# Patient Record
Sex: FEMALE | Race: WHITE | NOT HISPANIC OR LATINO | Employment: UNEMPLOYED | URBAN - METROPOLITAN AREA
[De-identification: names, ages, dates, MRNs, and addresses within clinical notes are randomized per-mention and may not be internally consistent; named-entity substitution may affect disease eponyms.]

---

## 2017-01-03 ENCOUNTER — APPOINTMENT (OUTPATIENT)
Dept: GENERAL RADIOLOGY | Facility: HOSPITAL | Age: 57
End: 2017-01-03

## 2017-01-03 ENCOUNTER — APPOINTMENT (OUTPATIENT)
Dept: CT IMAGING | Facility: HOSPITAL | Age: 57
End: 2017-01-03

## 2017-01-03 ENCOUNTER — PREP FOR SURGERY (OUTPATIENT)
Dept: CARDIOLOGY | Facility: CLINIC | Age: 57
End: 2017-01-03

## 2017-01-03 ENCOUNTER — HOSPITAL ENCOUNTER (EMERGENCY)
Facility: HOSPITAL | Age: 57
Discharge: HOME | End: 2017-01-03
Attending: EMERGENCY MEDICINE | Admitting: EMERGENCY MEDICINE

## 2017-01-03 VITALS
WEIGHT: 130 LBS | HEIGHT: 66 IN | DIASTOLIC BLOOD PRESSURE: 70 MMHG | HEART RATE: 70 BPM | TEMPERATURE: 97.9 F | RESPIRATION RATE: 18 BRPM | OXYGEN SATURATION: 92 % | SYSTOLIC BLOOD PRESSURE: 99 MMHG | BODY MASS INDEX: 20.89 KG/M2

## 2017-01-03 DIAGNOSIS — F19.10 DRUG ABUSE (HCC): ICD-10-CM

## 2017-01-03 DIAGNOSIS — I42.9 CARDIOMYOPATHY (HCC): Primary | ICD-10-CM

## 2017-01-03 DIAGNOSIS — R40.0 SOMNOLENCE: Primary | ICD-10-CM

## 2017-01-03 LAB
ALBUMIN SERPL-MCNC: 4.2 G/DL (ref 3.2–4.8)
ALBUMIN/GLOB SERPL: 1.7 G/DL (ref 1.5–2.5)
ALP SERPL-CCNC: 78 U/L (ref 25–100)
ALT SERPL W P-5'-P-CCNC: 6 U/L (ref 7–40)
AMPHET+METHAMPHET UR QL: NEGATIVE
AMPHETAMINES UR QL: NEGATIVE
ANION GAP SERPL CALCULATED.3IONS-SCNC: 3 MMOL/L (ref 3–11)
APAP SERPL-MCNC: <10 MCG/ML (ref 10–30)
AST SERPL-CCNC: 17 U/L (ref 0–33)
BACTERIA UR QL AUTO: ABNORMAL /HPF
BARBITURATES UR QL SCN: NEGATIVE
BASOPHILS # BLD AUTO: 0.05 10*3/MM3 (ref 0–0.2)
BASOPHILS NFR BLD AUTO: 0.6 % (ref 0–1)
BENZODIAZ UR QL SCN: POSITIVE
BILIRUB SERPL-MCNC: 0.2 MG/DL (ref 0.3–1.2)
BILIRUB UR QL STRIP: ABNORMAL
BUN BLD-MCNC: 14 MG/DL (ref 9–23)
BUN/CREAT SERPL: 20 (ref 7–25)
BUPRENORPHINE SERPL-MCNC: NEGATIVE NG/ML
CALCIUM SPEC-SCNC: 9.5 MG/DL (ref 8.7–10.4)
CANNABINOIDS SERPL QL: NEGATIVE
CHLORIDE SERPL-SCNC: 110 MMOL/L (ref 99–109)
CLARITY UR: CLEAR
CO2 SERPL-SCNC: 28 MMOL/L (ref 20–31)
COCAINE UR QL: NEGATIVE
COLOR UR: YELLOW
CREAT BLD-MCNC: 0.7 MG/DL (ref 0.6–1.3)
DEPRECATED RDW RBC AUTO: 47 FL (ref 37–54)
EOSINOPHIL # BLD AUTO: 0.17 10*3/MM3 (ref 0.1–0.3)
EOSINOPHIL NFR BLD AUTO: 2.1 % (ref 0–3)
ERYTHROCYTE [DISTWIDTH] IN BLOOD BY AUTOMATED COUNT: 12.5 % (ref 11.3–14.5)
ETHANOL BLD-MCNC: <10 MG/DL (ref 0–10)
GFR SERPL CREATININE-BSD FRML MDRD: 87 ML/MIN/1.73
GLOBULIN UR ELPH-MCNC: 2.5 GM/DL
GLUCOSE BLD-MCNC: 90 MG/DL (ref 70–100)
GLUCOSE BLDC GLUCOMTR-MCNC: 107 MG/DL (ref 70–130)
GLUCOSE UR STRIP-MCNC: NEGATIVE MG/DL
HCT VFR BLD AUTO: 38.8 % (ref 34.5–44)
HGB BLD-MCNC: 13.1 G/DL (ref 11.5–15.5)
HGB UR QL STRIP.AUTO: ABNORMAL
HOLD SPECIMEN: NORMAL
HYALINE CASTS UR QL AUTO: ABNORMAL /LPF
IMM GRANULOCYTES # BLD: 0.01 10*3/MM3 (ref 0–0.03)
IMM GRANULOCYTES NFR BLD: 0.1 % (ref 0–0.6)
KETONES UR QL STRIP: NEGATIVE
LEUKOCYTE ESTERASE UR QL STRIP.AUTO: NEGATIVE
LYMPHOCYTES # BLD AUTO: 2.91 10*3/MM3 (ref 0.6–4.8)
LYMPHOCYTES NFR BLD AUTO: 35.6 % (ref 24–44)
MAGNESIUM SERPL-MCNC: 2.1 MG/DL (ref 1.3–2.7)
MCH RBC QN AUTO: 34.6 PG (ref 27–31)
MCHC RBC AUTO-ENTMCNC: 33.8 G/DL (ref 32–36)
MCV RBC AUTO: 102.4 FL (ref 80–99)
METHADONE UR QL SCN: NEGATIVE
MONOCYTES # BLD AUTO: 0.31 10*3/MM3 (ref 0–1)
MONOCYTES NFR BLD AUTO: 3.8 % (ref 0–12)
MUCOUS THREADS URNS QL MICRO: ABNORMAL /HPF
NEUTROPHILS # BLD AUTO: 4.72 10*3/MM3 (ref 1.5–8.3)
NEUTROPHILS NFR BLD AUTO: 57.8 % (ref 41–71)
NITRITE UR QL STRIP: NEGATIVE
OPIATES UR QL: POSITIVE
OXYCODONE UR QL SCN: NEGATIVE
PCP UR QL SCN: NEGATIVE
PH UR STRIP.AUTO: 5.5 [PH] (ref 5–8)
PLATELET # BLD AUTO: 214 10*3/MM3 (ref 150–450)
PMV BLD AUTO: 10 FL (ref 6–12)
POTASSIUM BLD-SCNC: 4.1 MMOL/L (ref 3.5–5.5)
PROPOXYPH UR QL: NEGATIVE
PROT SERPL-MCNC: 6.7 G/DL (ref 5.7–8.2)
PROT UR QL STRIP: NEGATIVE
RBC # BLD AUTO: 3.79 10*6/MM3 (ref 3.89–5.14)
RBC # UR: ABNORMAL /HPF
REF LAB TEST METHOD: ABNORMAL
SALICYLATES SERPL-MCNC: <1 MG/DL (ref 0–29)
SODIUM BLD-SCNC: 141 MMOL/L (ref 132–146)
SP GR UR STRIP: 1.02 (ref 1–1.03)
SQUAMOUS #/AREA URNS HPF: ABNORMAL /HPF
TRICYCLICS UR QL SCN: NEGATIVE
TROPONIN I SERPL-MCNC: 0 NG/ML (ref 0–0.07)
TSH SERPL DL<=0.05 MIU/L-ACNC: 1.21 MIU/ML (ref 0.35–5.35)
UROBILINOGEN UR QL STRIP: ABNORMAL
WBC NRBC COR # BLD: 8.17 10*3/MM3 (ref 3.5–10.8)
WBC UR QL AUTO: ABNORMAL /HPF
WHOLE BLOOD HOLD SPECIMEN: NORMAL
WHOLE BLOOD HOLD SPECIMEN: NORMAL

## 2017-01-03 PROCEDURE — 80306 DRUG TEST PRSMV INSTRMNT: CPT | Performed by: EMERGENCY MEDICINE

## 2017-01-03 PROCEDURE — 96374 THER/PROPH/DIAG INJ IV PUSH: CPT

## 2017-01-03 PROCEDURE — 84484 ASSAY OF TROPONIN QUANT: CPT

## 2017-01-03 PROCEDURE — 80307 DRUG TEST PRSMV CHEM ANLYZR: CPT | Performed by: EMERGENCY MEDICINE

## 2017-01-03 PROCEDURE — 71010 HC CHEST PA OR AP: CPT

## 2017-01-03 PROCEDURE — 85025 COMPLETE CBC W/AUTO DIFF WBC: CPT | Performed by: EMERGENCY MEDICINE

## 2017-01-03 PROCEDURE — 96361 HYDRATE IV INFUSION ADD-ON: CPT

## 2017-01-03 PROCEDURE — 99284 EMERGENCY DEPT VISIT MOD MDM: CPT

## 2017-01-03 PROCEDURE — 81001 URINALYSIS AUTO W/SCOPE: CPT | Performed by: EMERGENCY MEDICINE

## 2017-01-03 PROCEDURE — 25010000002 NALOXONE PER 1 MG

## 2017-01-03 PROCEDURE — 70450 CT HEAD/BRAIN W/O DYE: CPT

## 2017-01-03 PROCEDURE — 83735 ASSAY OF MAGNESIUM: CPT | Performed by: EMERGENCY MEDICINE

## 2017-01-03 PROCEDURE — 82962 GLUCOSE BLOOD TEST: CPT

## 2017-01-03 PROCEDURE — 93005 ELECTROCARDIOGRAM TRACING: CPT

## 2017-01-03 PROCEDURE — 80053 COMPREHEN METABOLIC PANEL: CPT | Performed by: EMERGENCY MEDICINE

## 2017-01-03 PROCEDURE — 84443 ASSAY THYROID STIM HORMONE: CPT | Performed by: EMERGENCY MEDICINE

## 2017-01-03 RX ORDER — NALOXONE HCL 0.4 MG/ML
0.4 VIAL (ML) INJECTION ONCE
Status: COMPLETED | OUTPATIENT
Start: 2017-01-03 | End: 2017-01-03

## 2017-01-03 RX ORDER — NALOXONE HYDROCHLORIDE 0.4 MG/ML
INJECTION, SOLUTION INTRAMUSCULAR; INTRAVENOUS; SUBCUTANEOUS
Status: COMPLETED
Start: 2017-01-03 | End: 2017-01-03

## 2017-01-03 RX ORDER — SODIUM CHLORIDE 0.9 % (FLUSH) 0.9 %
10 SYRINGE (ML) INJECTION AS NEEDED
Status: DISCONTINUED | OUTPATIENT
Start: 2017-01-03 | End: 2017-01-03 | Stop reason: HOSPADM

## 2017-01-03 RX ORDER — SODIUM CHLORIDE 0.9 % (FLUSH) 0.9 %
1-10 SYRINGE (ML) INJECTION AS NEEDED
Status: CANCELLED | OUTPATIENT
Start: 2017-01-03

## 2017-01-03 RX ADMIN — NALOXONE HYDROCHLORIDE 0.4 MG: 0.4 INJECTION, SOLUTION INTRAMUSCULAR; INTRAVENOUS; SUBCUTANEOUS at 17:16

## 2017-01-03 RX ADMIN — Medication 0.4 MG: at 17:16

## 2017-01-03 RX ADMIN — Medication 0.4 MG: at 17:30

## 2017-01-03 RX ADMIN — NALOXONE HYDROCHLORIDE 0.4 MG: 0.4 INJECTION, SOLUTION INTRAMUSCULAR; INTRAVENOUS; SUBCUTANEOUS at 17:30

## 2017-01-03 RX ADMIN — SODIUM CHLORIDE 1000 ML: 9 INJECTION, SOLUTION INTRAVENOUS at 17:42

## 2017-01-03 NOTE — ED PROVIDER NOTES
"Subjective   HPI Comments: 56 yr old female presents to the ED with AMS. She reports that she took 2 10 mg Xanax po this morning due to, what she verbalized to the nurse, has been a significant amount of ongoing stress. She also notes that she took some morphine (\"3 pills\") yesterday. When a boyfriend dropped her off at a store, she staggered back out shortly thereafter. She was also dizzy/lightheaded and non-communicative.  notes that the pt seemed at baseline before entering the store. Pt denies SI. Pt c/o chronic back pain and a cough for a couple of days. She denies CP, SOA, or fever/chills.     When EMS arrived on the scene, they noted that the pt's pupils were initially pinpoint. She had mild improvement of sx with some Narcan in the ED.            Patient is a 56 y.o. female presenting with altered mental status and drug/alcohol assessment.   History provided by:  Patient and friend  Altered Mental Status   Presenting symptoms: confusion and partial responsiveness (difficulty with speech)    Severity:  Moderate  Most recent episode:  Today  Progression:  Partially resolved  Chronicity:  New  Context: not taking medications as prescribed    Associated symptoms: light-headedness    Associated symptoms: no fever    Drug / Alcohol Assessment   Primary symptoms include confusion, somnolence. This is a new problem. The current episode started 3 to 5 hours ago. The problem has been gradually improving. Suspected agents: Xanax and morpine. Pertinent negatives include no fever.       Review of Systems   Constitutional: Negative for chills and fever.   Respiratory: Positive for cough. Negative for shortness of breath.    Cardiovascular: Negative for chest pain.   Musculoskeletal: Positive for back pain (chronic) and gait problem.   Neurological: Positive for dizziness, speech difficulty and light-headedness.   Psychiatric/Behavioral: Positive for confusion. Negative for suicidal ideas. The patient is " "nervous/anxious (significant amount of stress prior to taking the Xanax today).    All other systems reviewed and are negative.      Past Medical History   Diagnosis Date   • Arthritis    • Back pain    • COPD (chronic obstructive pulmonary disease)    • Depression    • Diverticulitis    • Dizziness    • Endometriosis    • Headache    • Hypertension    • IBS (irritable bowel syndrome)    • ICD (implantable cardioverter-defibrillator) in place    • Insomnia    • Long Q-T syndrome    • Myocardial infarction 2006   • Restless leg    • Seizure      2006- with med reaction to phenergan- per pt   • Wears dentures    • Wears glasses      reading       Allergies   Allergen Reactions   • Levaquin [Levofloxacin] Hives   • Morphine And Related Nausea And Vomiting   • Phenergan [Promethazine Hcl] Other (See Comments)     seizure   • Vancomycin Other (See Comments)     \"ed\" syndrome   • Sulfa Antibiotics Rash       Past Surgical History   Procedure Laterality Date   • Hysterectomy  1989     Via exploratory laparotomy (transverse incision) along with incidental appendectomy.  Done for endometriosis   • Lumbar fusion  2015   • Oophorectomy Right 1986     Done via vertical midline incision   • Bladder suspension  2000 [place] - done via laparotomy   • Colonoscopy       2010   • Cardiac electrophysiology procedure N/A 8/4/2016     Procedure: replace old icd, likely extract both old leads with OR back-up;  Surgeon: Robert Queen MD;  Location: [facility] INVASIVE LOC[facility];  Service:    • Meniscectomy Right 1993   • Tonsillectomy and adenoidectomy  1973       Family History   Problem Relation Age of Onset   • Arthritis Mother    • Hypertension Mother    • Thyroid disease Mother    • Hypertension Father    • Colon cancer Sister 50   • Hypertension Brother        Social History     Social History   • Marital status:      Spouse name: N/A   • Number of children: N/A   • Years of education: N/A     Social History Main " Topics   • Smoking status: Light Tobacco Smoker     Packs/day: 0.50     Types: Cigarettes, Electronic Cigarette     Start date: 1975   • Smokeless tobacco: Never Used      Comment: down to 5 cigarettes daily---ecig 1 time per hour- per pt   • Alcohol use No   • Drug use: No   • Sexual activity: Defer     Other Topics Concern   • None     Social History Narrative         Objective   Physical Exam   Constitutional: She is oriented to person, place, and time. She appears well-developed and well-nourished. She appears lethargic.  Non-toxic appearance. No distress.   Somnolent, but awoke and answered questions appropriately to voice   HENT:   Head: Normocephalic and atraumatic.   Right Ear: External ear normal.   Left Ear: External ear normal.   Nose: Nose normal.   Eyes: EOM and lids are normal. Pupils are equal, round, and reactive to light.   Neck: Normal range of motion. Neck supple. No tracheal deviation present.   Cardiovascular: Normal rate, regular rhythm and normal heart sounds.  Exam reveals no gallop, no friction rub and no decreased pulses.    No murmur heard.  Pulmonary/Chest: Effort normal and breath sounds normal. No respiratory distress. She has no decreased breath sounds. She has no wheezes. She has no rhonchi. She has no rales.   Abdominal: Soft. Normal appearance and bowel sounds are normal. There is no tenderness. There is no rebound and no guarding.   Musculoskeletal: Normal range of motion. She exhibits no deformity.   Lower extremity exam is unremarkable   Lymphadenopathy:     She has no cervical adenopathy.   Neurological: She is oriented to person, place, and time. She has normal strength. She appears lethargic. No cranial nerve deficit or sensory deficit.   GCS of 14    No neurological deficits are appreciated   Skin: Skin is warm and dry. No rash noted. She is not diaphoretic.   Psychiatric: She has a normal mood and affect. Her speech is normal and behavior is normal. Judgment and thought  content normal. Cognition and memory are normal.   Nursing note and vitals reviewed.      Procedures         ED Course  ED Course                     MDM  Number of Diagnoses or Management Options  Drug abuse: new and requires workup  Somnolence: new and requires workup  Diagnosis management comments: Patient appears better.     Ilicit drug use felt to be cause for altered mentation.     No significant lab or imaging abnormalities.        Amount and/or Complexity of Data Reviewed  Clinical lab tests: ordered and reviewed  Tests in the radiology section of CPT®: ordered and reviewed  Decide to obtain previous medical records or to obtain history from someone other than the patient: yes  Obtain history from someone other than the patient: yes  Review and summarize past medical records: yes  Independent visualization of images, tracings, or specimens: yes    Patient Progress  Patient progress: stable      Final diagnoses:   Somnolence   Drug abuse       Documentation assistance provided by laverne Cobian.  Information recorded by the scribe was done at my direction and has been verified and validated by me.     Mira Cobian  01/03/17 1754       Mira Cobian  01/03/17 1934       Manda Dominique MD  01/03/17 1943

## 2017-01-04 ENCOUNTER — APPOINTMENT (OUTPATIENT)
Dept: PREADMISSION TESTING | Facility: HOSPITAL | Age: 57
End: 2017-01-04

## 2017-01-05 ENCOUNTER — TELEPHONE (OUTPATIENT)
Dept: INTERNAL MEDICINE | Facility: CLINIC | Age: 57
End: 2017-01-05

## 2017-01-05 NOTE — TELEPHONE ENCOUNTER
She is not able to see pain management until April 6th. She stated that is the first available appt. She says she is in so much pain she wants to die. Has taken IBU, tylenol, and aleve.  Note on 01/03 stating she was in ER with what they felt was illicit drug use.   She would like for you to give her something for pain.  Please advise.

## 2017-01-06 RX ORDER — DESVENLAFAXINE 50 MG/1
50 TABLET, EXTENDED RELEASE ORAL DAILY
Qty: 30 TABLET | Refills: 2 | Status: SHIPPED | OUTPATIENT
Start: 2017-01-06 | End: 2017-04-16 | Stop reason: SDUPTHER

## 2017-01-06 NOTE — TELEPHONE ENCOUNTER
Spoke with pt and she declines to take the tramadol rx, states its like candied ibuprofen and does not work for her, and says that she has also tried to get  to change her depression medication and it hasnt been done,  said we could try wellbutrin xl 150, pt said last time she took that she went off but she has never tried it along with celexa,  said if it affected her in that way she does not want her to take it, told her we would change it to Pristiq and had samples for her to try that would last 21 days in case the rx needed a pa

## 2017-01-06 NOTE — TELEPHONE ENCOUNTER
She was suppose to have her ICD relocated but refused to have it done because she was requesting oxycodone and did not get it. Can dc her nsaids and tylenol. Can have tramdol 50 ng poqd x 3 days only. 3. Must be seen by pain management

## 2017-01-11 ENCOUNTER — TELEPHONE (OUTPATIENT)
Dept: INTERNAL MEDICINE | Facility: CLINIC | Age: 57
End: 2017-01-11

## 2017-01-11 NOTE — TELEPHONE ENCOUNTER
Received fax from [facility] pharmacy requesting a PA for Pristiq 50 mg tab, submitted PA via phone, PA was approved for 1 year, reference # [id number], pharmacy notified.

## 2017-01-22 DIAGNOSIS — J43.9 PULMONARY EMPHYSEMA, UNSPECIFIED EMPHYSEMA TYPE (HCC): ICD-10-CM

## 2017-02-21 DIAGNOSIS — R11.15 INTRACTABLE CYCLICAL VOMITING WITH NAUSEA: ICD-10-CM

## 2017-02-21 RX ORDER — ONDANSETRON 4 MG/1
TABLET, ORALLY DISINTEGRATING ORAL
Qty: 30 TABLET | Refills: 0 | Status: SHIPPED | OUTPATIENT
Start: 2017-02-21 | End: 2017-03-21 | Stop reason: SDUPTHER

## 2017-03-21 ENCOUNTER — OFFICE VISIT (OUTPATIENT)
Dept: INTERNAL MEDICINE | Facility: CLINIC | Age: 57
End: 2017-03-21

## 2017-03-21 VITALS
OXYGEN SATURATION: 97 % | BODY MASS INDEX: 23.24 KG/M2 | SYSTOLIC BLOOD PRESSURE: 120 MMHG | WEIGHT: 144 LBS | DIASTOLIC BLOOD PRESSURE: 90 MMHG | HEART RATE: 107 BPM

## 2017-03-21 DIAGNOSIS — J44.9 COPD, MODERATE (HCC): ICD-10-CM

## 2017-03-21 DIAGNOSIS — F32.A DEPRESSION, UNSPECIFIED DEPRESSION TYPE: ICD-10-CM

## 2017-03-21 DIAGNOSIS — F17.200 TOBACCO DEPENDENCE: ICD-10-CM

## 2017-03-21 DIAGNOSIS — J43.9 PULMONARY EMPHYSEMA, UNSPECIFIED EMPHYSEMA TYPE (HCC): ICD-10-CM

## 2017-03-21 DIAGNOSIS — I10 ESSENTIAL HYPERTENSION: Primary | ICD-10-CM

## 2017-03-21 DIAGNOSIS — R11.15 INTRACTABLE CYCLICAL VOMITING WITH NAUSEA: ICD-10-CM

## 2017-03-21 DIAGNOSIS — D12.6 TUBULAR ADENOMA OF COLON: ICD-10-CM

## 2017-03-21 PROCEDURE — 99213 OFFICE O/P EST LOW 20 MIN: CPT | Performed by: INTERNAL MEDICINE

## 2017-03-21 PROCEDURE — 99406 BEHAV CHNG SMOKING 3-10 MIN: CPT | Performed by: INTERNAL MEDICINE

## 2017-03-21 RX ORDER — SPIRONOLACTONE 25 MG/1
25 TABLET ORAL DAILY
Qty: 30 TABLET | Refills: 3 | Status: SHIPPED | OUTPATIENT
Start: 2017-03-21 | End: 2017-07-17 | Stop reason: SDUPTHER

## 2017-03-21 RX ORDER — ONDANSETRON 4 MG/1
TABLET, ORALLY DISINTEGRATING ORAL
Qty: 30 TABLET | Refills: 0 | Status: SHIPPED | OUTPATIENT
Start: 2017-03-21 | End: 2017-04-21 | Stop reason: SDUPTHER

## 2017-03-21 NOTE — PROGRESS NOTES
Subjective   Melita Syed is a 56 y.o. female.   Chief Complaint   Patient presents with   • COPD   • Hypertension   • Depression       Hypertension   This is a chronic problem. The current episode started more than 1 year ago. Pertinent negatives include no chest pain, headaches, neck pain or shortness of breath. The current treatment provides mild improvement. Compliance problems include diet and exercise.    COPD   This is a chronic problem. The current episode started more than 1 year ago. Associated symptoms include coughing. Pertinent negatives include no abdominal pain, arthralgias, chest pain, chills, congestion, diaphoresis, fatigue, fever, headaches, myalgias, nausea, neck pain, numbness, rash, sore throat or vomiting.   Depression   Visit Type: follow-up  Patient is not experiencing: panic, psychomotor agitation, psychomotor retardation, restlessness, shortness of breath, suicidal ideas and suicidal planning.       Cough x months. CXR showed no acute findings.  The following portions of the patient's history were reviewed and updated as appropriate: allergies, current medications, past family history, past medical history, past social history, past surgical history and problem list.    Review of Systems   Constitutional: Negative for activity change, appetite change, chills, diaphoresis, fatigue, fever and unexpected weight change.   HENT: Negative for congestion, ear discharge, ear pain, mouth sores, nosebleeds, sinus pressure, sneezing and sore throat.    Eyes: Negative for pain, discharge and itching.   Respiratory: Positive for cough. Negative for chest tightness, shortness of breath and wheezing.    Cardiovascular: Negative for chest pain and leg swelling.   Gastrointestinal: Negative for abdominal pain, constipation, diarrhea, nausea and vomiting.   Endocrine: Negative for cold intolerance, heat intolerance, polydipsia and polyphagia.   Genitourinary: Negative for dysuria, flank pain, frequency,  hematuria and urgency.   Musculoskeletal: Negative for arthralgias, back pain, gait problem, myalgias, neck pain and neck stiffness.   Skin: Negative for color change, pallor and rash.   Neurological: Negative for seizures, speech difficulty, numbness and headaches.   Psychiatric/Behavioral: Negative for agitation, sleep disturbance and suicidal ideas.     Visit Vitals   • /90   • Pulse 107   • Wt 144 lb (65.3 kg)   • LMP 09/12/1989 (Approximate)   • SpO2 97%   • BMI 23.24 kg/m2       Objective   Physical Exam   Constitutional: She appears well-developed.   HENT:   Head: Normocephalic.   Right Ear: External ear normal.   Left Ear: External ear normal.   Nose: Nose normal.   Mouth/Throat: Oropharynx is clear and moist.   Eyes: Conjunctivae are normal. Pupils are equal, round, and reactive to light.   Neck: No JVD present. No thyromegaly present.   Cardiovascular: Normal rate, regular rhythm and normal heart sounds.  Exam reveals no friction rub.    No murmur heard.  Pulmonary/Chest: Effort normal and breath sounds normal. No respiratory distress. She has no wheezes. She has no rales.   Abdominal: Soft. Bowel sounds are normal. She exhibits no distension. There is no tenderness. There is no guarding.   Musculoskeletal: She exhibits no edema or tenderness.   Lymphadenopathy:     She has no cervical adenopathy.   Neurological: She displays normal reflexes. No cranial nerve deficit.   Skin: No rash noted.   Psychiatric: Her behavior is normal.   Nursing note and vitals reviewed.      Assessment/Plan   Melita was seen today for copd, hypertension and depression.    Diagnoses and all orders for this visit:    Essential hypertension  DBP is up but not taking her meds today. She will take her metoprolol when she gets home Pulse is up today but has not had her b blocker  Tubular adenoma of colon  Will need repeat colonoscopy in 2019/Sept  Tobacco dependence  3 minutes counseling but not ready to quit  COPD,  moderate  stable  Depression, unspecified depression type  Stable  50% of visit spent counseling.  Blood work next visit

## 2017-03-21 NOTE — PATIENT INSTRUCTIONS
IF YOU SMOKE OR USE TOBACCO PLEASE READ THE FOLLOWING:    Why is smoking bad for me?  Smoking increases the risk of heart disease, lung disease, vascular disease, stroke, and cancer.     If you smoke, STOP!    If you would like more information on quitting smoking, please visit the [employer] website: [email or web address]   This link will provide additional resources including the QUIT line and the Beat the Pack support groups.     For more information:    American Cancer Society  [phone]    American Heart Association  [phone]      Steps to Quit Smoking   Smoking tobacco can be harmful to your health and can affect almost every organ in your body. Smoking puts you, and those around you, at risk for developing many serious chronic diseases. Quitting smoking is difficult, but it is one of the best things that you can do for your health. It is never too late to quit.  WHAT ARE THE BENEFITS OF QUITTING SMOKING?  When you quit smoking, you lower your risk of developing serious diseases and conditions, such as:  · Lung cancer or lung disease, such as COPD.  · Heart disease.  · Stroke.  · Heart attack.  · Infertility.  · Osteoporosis and bone fractures.  Additionally, symptoms such as coughing, wheezing, and shortness of breath may get better when you quit. You may also find that you get sick less often because your body is stronger at fighting off colds and infections. If you are pregnant, quitting smoking can help to reduce your chances of having a baby of low birth weight.  HOW DO I GET READY TO QUIT?  When you decide to quit smoking, create a plan to make sure that you are successful. Before you quit:  · Pick a date to quit. Set a date within the next two weeks to give you time to prepare.  · Write down the reasons why you are quitting. Keep this list in places where you will see it often, such as on your bathroom mirror or in your car or  "wallet.  · Identify the people, places, things, and activities that make you want to smoke (triggers) and avoid them. Make sure to take these actions:    Throw away all cigarettes at home, at work, and in your car.    Throw away smoking accessories, such as ashtrays and lighters.    Clean your car and make sure to empty the ashtray.    Clean your home, including curtains and carpets.  · Tell your family, friends, and coworkers that you are quitting. Support from your loved ones can make quitting easier.  · Talk with your health care provider about your options for quitting smoking.  · Find out what treatment options are covered by your health insurance.  WHAT STRATEGIES CAN I USE TO QUIT SMOKING?   Talk with your healthcare provider about different strategies to quit smoking. Some strategies include:  · Quitting smoking altogether instead of gradually lessening how much you smoke over a period of time. Research shows that quitting \"cold turkey\" is more successful than gradually quitting.  · Attending in-person counseling to help you build problem-solving skills. You are more likely to have success in quitting if you attend several counseling sessions. Even short sessions of 10 minutes can be effective.  · Finding resources and support systems that can help you to quit smoking and remain smoke-free after you quit. These resources are most helpful when you use them often. They can include:    Online chats with a counselor.    Telephone quitlines.    Printed self-help materials.    Support groups or group counseling.    Text messaging programs.    Mobile phone applications.  · Taking medicines to help you quit smoking. (If you are pregnant or breastfeeding, talk with your health care provider first.) Some medicines contain nicotine and some do not. Both types of medicines help with cravings, but the medicines that include nicotine help to relieve withdrawal symptoms. Your health care provider may recommend:    Nicotine " patches, gum, or lozenges.    Nicotine inhalers or sprays.    Non-nicotine medicine that is taken by mouth.  Talk with your health care provider about combining strategies, such as taking medicines while you are also receiving in-person counseling. Using these two strategies together makes you more likely to succeed in quitting than if you used either strategy on its own.  If you are pregnant or breastfeeding, talk with your health care provider about finding counseling or other support strategies to quit smoking. Do not take medicine to help you quit smoking unless told to do so by your health care provider.  WHAT THINGS CAN I DO TO MAKE IT EASIER TO QUIT?  Quitting smoking might feel overwhelming at first, but there is a lot that you can do to make it easier. Take these important actions:  · Reach out to your family and friends and ask that they support and encourage you during this time. Call telephone quitlines, reach out to support groups, or work with a counselor for support.  · Ask people who smoke to avoid smoking around you.  · Avoid places that trigger you to smoke, such as bars, parties, or smoke-break areas at work.  · Spend time around people who do not smoke.  · Lessen stress in your life, because stress can be a smoking trigger for some people. To lessen stress, try:    Exercising regularly.    Deep-breathing exercises.    Yoga.    Meditating.    Performing a body scan. This involves closing your eyes, scanning your body from head to toe, and noticing which parts of your body are particularly tense. Purposefully relax the muscles in those areas.  · Download or purchase mobile phone or tablet apps (applications) that can help you stick to your quit plan by providing reminders, tips, and encouragement. There are many free apps, such as QuitGuide from the CDC (Centers for Disease Control and Prevention). You can find other support for quitting smoking (smoking cessation) through smokefree.gov and other  websites.  HOW WILL I FEEL WHEN I QUIT SMOKING?  Within the first 24 hours of quitting smoking, you may start to feel some withdrawal symptoms. These symptoms are usually most noticeable 2-3 days after quitting, but they usually do not last beyond 2-3 weeks. Changes or symptoms that you might experience include:  · Mood swings.  · Restlessness, anxiety, or irritation.  · Difficulty concentrating.  · Dizziness.  · Strong cravings for sugary foods in addition to nicotine.  · Mild weight gain.  · Constipation.  · Nausea.  · Coughing or a sore throat.  · Changes in how your medicines work in your body.  · A depressed mood.  · Difficulty sleeping (insomnia).  After the first 2-3 weeks of quitting, you may start to notice more positive results, such as:  · Improved sense of smell and taste.  · Decreased coughing and sore throat.  · Slower heart rate.  · Lower blood pressure.  · Clearer skin.  · The ability to breathe more easily.  · Fewer sick days.  Quitting smoking is very challenging for most people. Do not get discouraged if you are not successful the first time. Some people need to make many attempts to quit before they achieve long-term success. Do your best to stick to your quit plan, and talk with your health care provider if you have any questions or concerns.     This information is not intended to replace advice given to you by your health care provider. Make sure you discuss any questions you have with your health care provider.     Document Released: 12/12/2002 Document Revised: 05/03/2016 Document Reviewed: 05/03/2016  [facility] Interactive Patient Education ©2016 [employer]

## 2017-04-17 RX ORDER — DESVENLAFAXINE SUCCINATE 50 MG/1
TABLET, EXTENDED RELEASE ORAL
Qty: 30 TABLET | Refills: 2 | Status: SHIPPED | OUTPATIENT
Start: 2017-04-17 | End: 2018-02-27 | Stop reason: ALTCHOICE

## 2017-04-21 DIAGNOSIS — J43.9 PULMONARY EMPHYSEMA, UNSPECIFIED EMPHYSEMA TYPE (HCC): ICD-10-CM

## 2017-04-21 DIAGNOSIS — R11.15 INTRACTABLE CYCLICAL VOMITING WITH NAUSEA: ICD-10-CM

## 2017-04-24 DIAGNOSIS — J43.9 PULMONARY EMPHYSEMA, UNSPECIFIED EMPHYSEMA TYPE (HCC): ICD-10-CM

## 2017-04-24 RX ORDER — ALBUTEROL SULFATE 90 UG/1
2 AEROSOL, METERED RESPIRATORY (INHALATION) EVERY 4 HOURS PRN
Qty: 1 INHALER | Refills: 2 | Status: SHIPPED | OUTPATIENT
Start: 2017-04-24

## 2017-04-24 RX ORDER — ALBUTEROL SULFATE 90 UG/1
AEROSOL, METERED RESPIRATORY (INHALATION)
Qty: 1 INHALER | Refills: 0 | Status: SHIPPED | OUTPATIENT
Start: 2017-04-24 | End: 2017-04-24 | Stop reason: SDUPTHER

## 2017-04-24 RX ORDER — ONDANSETRON 4 MG/1
TABLET, ORALLY DISINTEGRATING ORAL
Qty: 30 TABLET | Refills: 0 | Status: SHIPPED | OUTPATIENT
Start: 2017-04-24 | End: 2018-02-27 | Stop reason: SDUPTHER

## 2017-05-22 RX ORDER — TRAZODONE HYDROCHLORIDE 100 MG/1
TABLET ORAL
Qty: 90 TABLET | Refills: 0 | Status: SHIPPED | OUTPATIENT
Start: 2017-05-22 | End: 2017-06-21 | Stop reason: SDUPTHER

## 2017-06-22 ENCOUNTER — TRANSCRIBE ORDERS (OUTPATIENT)
Dept: MAMMOGRAPHY | Facility: HOSPITAL | Age: 57
End: 2017-06-22

## 2017-06-22 DIAGNOSIS — Z12.31 VISIT FOR SCREENING MAMMOGRAM: Primary | ICD-10-CM

## 2017-06-22 RX ORDER — TRAZODONE HYDROCHLORIDE 100 MG/1
TABLET ORAL
Qty: 90 TABLET | Refills: 1 | Status: SHIPPED | OUTPATIENT
Start: 2017-06-22 | End: 2017-08-21 | Stop reason: SDUPTHER

## 2017-07-05 ENCOUNTER — APPOINTMENT (OUTPATIENT)
Dept: OTHER | Facility: HOSPITAL | Age: 57
End: 2017-07-05

## 2017-07-05 ENCOUNTER — HOSPITAL ENCOUNTER (OUTPATIENT)
Dept: MAMMOGRAPHY | Facility: HOSPITAL | Age: 57
Discharge: HOME | End: 2017-07-05
Admitting: NURSE PRACTITIONER

## 2017-07-05 DIAGNOSIS — Z12.31 VISIT FOR SCREENING MAMMOGRAM: ICD-10-CM

## 2017-07-05 DIAGNOSIS — Z92.89 H/O MAMMOGRAM: ICD-10-CM

## 2017-07-05 PROCEDURE — 77067 SCR MAMMO BI INCL CAD: CPT | Performed by: RADIOLOGY

## 2017-07-05 PROCEDURE — G0202 SCR MAMMO BI INCL CAD: HCPCS

## 2017-07-05 PROCEDURE — 77063 BREAST TOMOSYNTHESIS BI: CPT | Performed by: RADIOLOGY

## 2017-07-05 PROCEDURE — 77063 BREAST TOMOSYNTHESIS BI: CPT

## 2017-07-14 ENCOUNTER — OFFICE VISIT (OUTPATIENT)
Dept: OBSTETRICS AND GYNECOLOGY | Facility: CLINIC | Age: 57
End: 2017-07-14

## 2017-07-14 VITALS
SYSTOLIC BLOOD PRESSURE: 100 MMHG | OXYGEN SATURATION: 92 % | DIASTOLIC BLOOD PRESSURE: 76 MMHG | BODY MASS INDEX: 24.07 KG/M2 | HEIGHT: 64 IN | WEIGHT: 141 LBS | RESPIRATION RATE: 14 BRPM | HEART RATE: 82 BPM

## 2017-07-14 DIAGNOSIS — R87.811 ASCUS WITH POSITIVE HIGH RISK HUMAN PAPILLOMAVIRUS OF VAGINA: Primary | ICD-10-CM

## 2017-07-14 DIAGNOSIS — R87.620 ASCUS WITH POSITIVE HIGH RISK HUMAN PAPILLOMAVIRUS OF VAGINA: Primary | ICD-10-CM

## 2017-07-14 PROCEDURE — 57452 EXAM OF CERVIX W/SCOPE: CPT | Performed by: OBSTETRICS & GYNECOLOGY

## 2017-07-14 RX ORDER — TRAZODONE HYDROCHLORIDE 300 MG/1
TABLET ORAL
COMMUNITY
Start: 2017-05-18 | End: 2017-07-14 | Stop reason: SDUPTHER

## 2017-07-14 RX ORDER — NICOTINE 14 MG/24H
PATCH, EXTENDED RELEASE TRANSDERMAL
COMMUNITY
Start: 2017-06-13 | End: 2018-02-27 | Stop reason: SDDI

## 2017-07-14 RX ORDER — CYCLOBENZAPRINE HCL 10 MG
TABLET ORAL
COMMUNITY
Start: 2017-05-18 | End: 2018-10-15 | Stop reason: SDUPTHER

## 2017-07-14 NOTE — PROGRESS NOTES
Colposcopy    Date of procedure:  7/14/2017    Risks and benefits discussed? yes  All questions answered? yes  Consents given by the patient  Written consent obtained? yes    Pre-op indication: ASCUS with POSITIVE high risk HPV  Procedure documentation:    The cuff was initially viewed colposcopically through a green filter.  The cuff was next bathed in acetic acid.   The findings were as follows:      The transformation zone was able to be seen adequately.    Findings: No visible lesions  No mosaicism  No punctation  No abnormal vasculature     Ectocervical biopsies not obtained..    An ECC was not performed.      Colposcopic Impression: 1. Adequate colposcopy  2. Colposcopic findings are consistent with PAP       Plan: follow-up in 6 month(s) for repeat Pap smear      This note was electronically signed.    Mychal Gerard M.D. TED.  July 14, 2017

## 2017-07-17 RX ORDER — SPIRONOLACTONE 25 MG/1
25 TABLET ORAL DAILY
Qty: 30 TABLET | Refills: 0 | Status: SHIPPED | OUTPATIENT
Start: 2017-07-17 | End: 2017-08-21 | Stop reason: SDUPTHER

## 2017-08-21 RX ORDER — SPIRONOLACTONE 25 MG/1
TABLET ORAL
Qty: 30 TABLET | Refills: 0 | Status: SHIPPED | OUTPATIENT
Start: 2017-08-21 | End: 2021-12-01

## 2017-08-21 RX ORDER — TRAZODONE HYDROCHLORIDE 100 MG/1
TABLET ORAL
Qty: 90 TABLET | Refills: 1 | Status: SHIPPED | OUTPATIENT
Start: 2017-08-21 | End: 2018-02-27 | Stop reason: SDUPTHER

## 2017-11-09 ENCOUNTER — DOCUMENTATION (OUTPATIENT)
Dept: CARDIOLOGY | Facility: CLINIC | Age: 57
End: 2017-11-09

## 2017-11-14 RX ORDER — POTASSIUM CHLORIDE 1500 MG/1
TABLET, EXTENDED RELEASE ORAL
Qty: 90 TABLET | Refills: 0 | OUTPATIENT
Start: 2017-11-14

## 2018-01-16 RX ORDER — TRAZODONE HYDROCHLORIDE 100 MG/1
TABLET ORAL
Qty: 90 TABLET | Refills: 1 | OUTPATIENT
Start: 2018-01-16

## 2018-01-19 RX ORDER — TRAZODONE HYDROCHLORIDE 100 MG/1
TABLET ORAL
Qty: 90 TABLET | Refills: 1 | OUTPATIENT
Start: 2018-01-19

## 2018-02-27 ENCOUNTER — OFFICE VISIT (OUTPATIENT)
Dept: OBSTETRICS AND GYNECOLOGY | Facility: CLINIC | Age: 58
End: 2018-02-27

## 2018-02-27 VITALS
DIASTOLIC BLOOD PRESSURE: 76 MMHG | BODY MASS INDEX: 23.15 KG/M2 | WEIGHT: 135.6 LBS | RESPIRATION RATE: 14 BRPM | OXYGEN SATURATION: 97 % | SYSTOLIC BLOOD PRESSURE: 110 MMHG | HEIGHT: 64 IN | HEART RATE: 95 BPM

## 2018-02-27 DIAGNOSIS — R87.610 ATYPICAL SQUAMOUS CELL CHANGES OF UNDETERMINED SIGNIFICANCE (ASCUS) ON CERVICAL CYTOLOGY WITH POSITIVE HIGH RISK HUMAN PAPILLOMA VIRUS (HPV): Primary | ICD-10-CM

## 2018-02-27 DIAGNOSIS — R87.810 ATYPICAL SQUAMOUS CELL CHANGES OF UNDETERMINED SIGNIFICANCE (ASCUS) ON CERVICAL CYTOLOGY WITH POSITIVE HIGH RISK HUMAN PAPILLOMA VIRUS (HPV): Primary | ICD-10-CM

## 2018-02-27 DIAGNOSIS — R87.810 ATYPICAL SQUAMOUS CELL CHANGES OF UNDETERMINED SIGNIFICANCE (ASCUS) ON CERVICAL CYTOLOGY WITH POSITIVE HIGH RISK HUMAN PAPILLOMA VIRUS (HPV): ICD-10-CM

## 2018-02-27 DIAGNOSIS — R87.610 ATYPICAL SQUAMOUS CELL CHANGES OF UNDETERMINED SIGNIFICANCE (ASCUS) ON CERVICAL CYTOLOGY WITH POSITIVE HIGH RISK HUMAN PAPILLOMA VIRUS (HPV): ICD-10-CM

## 2018-02-27 PROCEDURE — 99213 OFFICE O/P EST LOW 20 MIN: CPT | Performed by: OBSTETRICS & GYNECOLOGY

## 2018-02-27 RX ORDER — TRAZODONE HYDROCHLORIDE 50 MG/1
50 TABLET ORAL NIGHTLY
COMMUNITY
Start: 2018-02-12 | End: 2019-12-11

## 2018-02-27 RX ORDER — CHLORAL HYDRATE 500 MG
1000 CAPSULE ORAL
COMMUNITY
End: 2021-12-01

## 2018-02-27 RX ORDER — ONDANSETRON 8 MG/1
TABLET, ORALLY DISINTEGRATING ORAL
COMMUNITY
Start: 2018-02-20 | End: 2020-01-28

## 2018-02-27 RX ORDER — ATORVASTATIN CALCIUM 10 MG/1
TABLET, FILM COATED ORAL
COMMUNITY
Start: 2018-02-08 | End: 2018-10-15 | Stop reason: SDUPTHER

## 2018-02-27 RX ORDER — VENLAFAXINE HYDROCHLORIDE 37.5 MG/1
CAPSULE, EXTENDED RELEASE ORAL
COMMUNITY
Start: 2018-02-24 | End: 2018-10-15 | Stop reason: SDUPTHER

## 2018-02-27 NOTE — PROGRESS NOTES
"Subjective   Chief Complaint   Patient presents with   • Follow-up     6 months repeat pap, painful intercourse     Melita Syed is a 57 y.o. year old  presenting to be seen for a PAP in follow-up of a prior abnormal PAP and/or HPV screen.    Current birth control method: Postmenopausal.    OTHER COMPLAINTS:  Nothing else     The following portions of the patient's history were reviewed and updated as appropriate:problem list, current medications, allergies, past family history, past medical history, past social history and past surgical history.    Smoking status: Light Tobacco Smoker                                                       Packs/day: 0.50      Years: 0.00         Types: Cigarettes, Electronic Cigarette     Start date:   Smokeless status: Never Used                      Comment: now smoking 1/2 pack per day    Review of Systems  Constitutional POS: nothing reported    NEG: anorexia or night sweats   Gastointestinal POS: nothing reported    NEG: bloating, change in bowel habits, melena or reflux symptoms   Genitourinary POS: nothing reported    NEG: dysuria or hematuria   Integument POS: nothing reported    NEG: moles that are changing in size, shape, color or rashes   Breast POS: nothing reported    NEG: persistent breast lump, skin dimpling or nipple discharge        Objective   /76  Pulse 95  Resp 14  Ht 161.3 cm (63.5\")  Wt 61.5 kg (135 lb 9.6 oz)  LMP 1989 (LMP Unknown)  SpO2 97%  Breastfeeding? No  BMI 23.64 kg/m2    General:  well developed; well nourished  no acute distress   Pelvis: Clinical staff was present for exam  External genitalia:  normal appearance of the external genitalia including Bartholin's and [place]'s glands.  :  urethral meatus normal;  Vaginal:  atrophic mucosal changes are present; Pap smear collected     Lab Review   No data reviewed    Imaging   No data reviewed       Assessment   1. ASCUS Pap with high-risk HPV     Plan   1. Await Pap " results for plan of care.  If normal patient will return to clinic in 6 months or on an as-needed basis.      New Medications Ordered This Visit   Medications   • venlafaxine XR (EFFEXOR-XR) 37.5 MG 24 hr capsule   • atorvastatin (LIPITOR) 10 MG tablet   • ondansetron ODT (ZOFRAN-ODT) 8 MG disintegrating tablet   • traZODone (DESYREL) 50 MG tablet   • Omega-3 Fatty Acids (FISH OIL) 1000 MG capsule capsule     Sig: Take 1,000 mg by mouth Daily With Breakfast.   • Coenzyme Q10 (CO Q 10 PO)     Sig: Take  by mouth 2 (Two) Times a Day.          This note was electronically signed.    Mychal Gerard M.D. TED  February 27, 2018

## 2018-04-09 ENCOUNTER — PROCEDURE VISIT (OUTPATIENT)
Dept: OBSTETRICS AND GYNECOLOGY | Facility: CLINIC | Age: 58
End: 2018-04-09

## 2018-04-09 VITALS
RESPIRATION RATE: 14 BRPM | SYSTOLIC BLOOD PRESSURE: 100 MMHG | OXYGEN SATURATION: 97 % | WEIGHT: 140.6 LBS | HEIGHT: 64 IN | HEART RATE: 103 BPM | BODY MASS INDEX: 24.01 KG/M2 | DIASTOLIC BLOOD PRESSURE: 76 MMHG

## 2018-04-09 DIAGNOSIS — R87.625 UNSATISFACTORY CYTOLOGY OF VAGINAL PAPANICOLAOU SMEAR: Primary | ICD-10-CM

## 2018-04-09 PROCEDURE — 99212-NC PR NO CHARGE CBC OFFICE OUTPATIENT VISIT 10 MINUTES: Performed by: OBSTETRICS & GYNECOLOGY

## 2018-04-09 RX ORDER — NICOTINE 21 MG/24HR
PATCH, TRANSDERMAL 24 HOURS TRANSDERMAL
COMMUNITY
Start: 2018-03-30 | End: 2018-10-15 | Stop reason: SDUPTHER

## 2018-04-09 NOTE — PROGRESS NOTES
"Subjective   Chief Complaint   Patient presents with   • Follow-up     Repeat pap, lower abdominal pain for about 2-3 months, and bladder issues.     Melita Syed is a 57 y.o. year old  presenting to be seen for a PAP in follow-up of a prior Insufficient PAP and/or HPV screen.    Current birth control method: Post-hysterectomy.    OTHER COMPLAINTS:  Nothing else     The following portions of the patient's history were reviewed and updated as appropriate:problem list, current medications, allergies, past family history, past medical history, past social history and past surgical history.    Smoking status: Light Tobacco Smoker                                                       Packs/day: 0.50      Years: 0.00         Types: Cigarettes, Electronic Cigarette     Start date:   Smokeless tobacco: Never Used                      Comment: now smoking 1/2 pack per day    Review of Systems  Constitutional POS: nothing reported    NEG: anorexia or night sweats   Gastointestinal POS: nothing reported    NEG: bloating, change in bowel habits, melena or reflux symptoms   Genitourinary POS: nothing reported    NEG: dysuria or hematuria   Integument POS: nothing reported    NEG: moles that are changing in size, shape, color or rashes   Breast POS: nothing reported    NEG: persistent breast lump, skin dimpling or nipple discharge        Objective   /76   Pulse 103   Resp 14   Ht 161.3 cm (63.5\")   Wt 63.8 kg (140 lb 9.6 oz)   LMP 1989 (LMP Unknown)   SpO2 97%   BMI 24.52 kg/m²     General:  well developed; well nourished  no acute distress   Pelvis: Clinical staff was present for exam  External genitalia:  normal appearance of the external genitalia including Bartholin's and [place]'s glands.  :  urethral meatus normal;  Vaginal:  normal pink mucosa without prolapse or lesions. Pap smear of vaginal cuff collected     Lab Review   No data reviewed    Imaging   No data reviewed       Assessment "   1. Repeat Pap smear     Plan   1. Await results for plan of care.  Patient will return to clinic as previously scheduled in 1 year      New Medications Ordered This Visit   Medications   • nicotine (NICODERM CQ) 21 MG/24HR patch          This note was electronically signed.    Mychal Gerard M.D. TED  April 9, 2018

## 2018-08-01 ENCOUNTER — TRANSCRIBE ORDERS (OUTPATIENT)
Dept: PREADMISSION TESTING | Facility: HOSPITAL | Age: 58
End: 2018-08-01

## 2018-08-01 DIAGNOSIS — M51.36 DEGENERATION OF LUMBAR INTERVERTEBRAL DISC: Primary | ICD-10-CM

## 2018-08-15 ENCOUNTER — HOSPITAL ENCOUNTER (OUTPATIENT)
Dept: CT IMAGING | Facility: HOSPITAL | Age: 58
Discharge: HOME | End: 2018-08-15
Admitting: PHYSICIAN ASSISTANT

## 2018-08-15 DIAGNOSIS — M51.36 DEGENERATION OF LUMBAR INTERVERTEBRAL DISC: ICD-10-CM

## 2018-08-15 PROCEDURE — 72131 CT LUMBAR SPINE W/O DYE: CPT

## 2018-10-15 ENCOUNTER — OFFICE VISIT (OUTPATIENT)
Dept: OBSTETRICS AND GYNECOLOGY | Facility: CLINIC | Age: 58
End: 2018-10-15

## 2018-10-15 VITALS — BODY MASS INDEX: 25.63 KG/M2 | SYSTOLIC BLOOD PRESSURE: 98 MMHG | DIASTOLIC BLOOD PRESSURE: 60 MMHG | WEIGHT: 147 LBS

## 2018-10-15 DIAGNOSIS — Z01.419 WOMEN'S ANNUAL ROUTINE GYNECOLOGICAL EXAMINATION: Primary | ICD-10-CM

## 2018-10-15 PROCEDURE — 99396 PREV VISIT EST AGE 40-64: CPT | Performed by: OBSTETRICS & GYNECOLOGY

## 2018-10-15 RX ORDER — TIZANIDINE 4 MG/1
4 TABLET ORAL EVERY 6 HOURS PRN
COMMUNITY
Start: 2018-08-17

## 2018-10-15 RX ORDER — IMIPRAMINE HCL 25 MG
TABLET ORAL
COMMUNITY
Start: 2018-09-26 | End: 2019-09-13 | Stop reason: HOSPADM

## 2018-10-15 NOTE — PROGRESS NOTES
Subjective   Chief Complaint   Patient presents with   • Gynecologic Exam     denies c/o's     Melita Syed is a 58 y.o. year old  menopausal female presenting to be seen for her annual exam.  This past year she has not been on hormone replacement therapy.  There has not been vaginal bleeding in the last 12 months.  Menopausal symptoms are present (hot flashes and vaginal dryness) and are significantly affecting her quality of life.    SEXUAL Hx:  She is currently sexually active.  In the past year there has not been new sexual partners.    Condoms are not typically used.  She would not like to be screened for STD's at today's exam.  HEALTH Hx:  She exercises regularly: no (and has no plans to become more active).  She wears her seat belt: yes.  She has concerns about domestic violence: no.  She has noticed changes in height: no.  OTHER COMPLAINTS:  Nothing else    The following portions of the patient's history were reviewed and updated as appropriate:problem list, current medications, allergies, past family history, past medical history, past social history and past surgical history.    Smoking status: Current Every Day Smoker                                                   Packs/day: 0.50      Years: 0.00         Types: Cigarettes, Electronic Cigarette     Start date:   Smokeless tobacco: Never Used                      Comment: now smoking 1/2 pack per day      Review of Systems  Constitutional POS: nothing reported    NEG: anorexia or night sweats   Gastointestinal POS: nothing reported    NEG: bloating, change in bowel habits, melena or reflux symptoms   Genitourinary POS: nothing reported    NEG: dysuria or hematuria   Integument POS: nothing reported    NEG: moles that are changing in size, shape, color or rashes   Breast POS: nothing reported    NEG: persistent breast lump, skin dimpling or nipple discharge        Objective   BP 98/60 (Patient Position: Sitting)   Wt 66.7 kg (147 lb)   LMP  09/12/1989 (LMP Unknown)   BMI 25.63 kg/m²     General:  well developed; well nourished  no acute distress   Skin:  No suspicious lesions seen   Thyroid: normal to inspection and palpation   Breasts:  Examined in supine position  Symmetric without masses or skin dimpling  Nipples normal without inversion, lesions or discharge  There are no palpable axillary nodes   Abdomen: soft, non-tender; no masses  no umbilical or inginual hernias are present  no hepato-splenomegaly   Pelvis: Clinical staff was present for exam  External genitalia:  normal appearance of the external genitalia including Bartholin's and [place]'s glands.  :  urethral meatus normal;  Vaginal:  atrophic mucosal changes are present; Vaginal cuff well supported.  Pap smear collected.  Cervix:   absent.  Uterus:  absent.  Adnexa:  non palpable bilaterally.        Assessment   1. Well woman exam  2. Dyspareunia  3. Incomplete bladder emptying     Plan   1. Await Pap smear results for plan of care.  Myrbetriq samples for incomplete bladder emptying.  Discussed possibility of using intrarosa for her dyspareunia secondary to menopause.  Patient is not an HRT candidate as she continues to smoke.  Patient to call back in 1 month if myrbetriq improves her symptoms.  Will otherwise return to clinic in 1 year or on an as-needed basis.      No orders of the defined types were placed in this encounter.         This note was electronically signed.    MD BAUDILIO AbelA

## 2018-11-14 ENCOUNTER — TELEPHONE (OUTPATIENT)
Dept: OBSTETRICS AND GYNECOLOGY | Facility: CLINIC | Age: 58
End: 2018-11-14

## 2018-11-14 NOTE — TELEPHONE ENCOUNTER
Dr Gerard    Pt calling to let you know that the samples of Seferinohoa worked so she would like a prescription called in.    Pt call back [phone]    [facility] in [place] [phone]

## 2018-11-27 ENCOUNTER — TELEPHONE (OUTPATIENT)
Dept: OBSTETRICS AND GYNECOLOGY | Facility: CLINIC | Age: 58
End: 2018-11-27

## 2018-11-27 NOTE — TELEPHONE ENCOUNTER
Moustapha Shannon needs a different rx called in to replace myrbetriq. Insurance won't cover it      [facility] in [place], [place]

## 2018-11-28 NOTE — TELEPHONE ENCOUNTER
Myrbetriq is the only drug in its class.  Reorder the medicine and when the PA comes through let them know that she has already failed 3 anticholinergics: Ditropan, Vesicare and Detrol.

## 2018-11-30 NOTE — TELEPHONE ENCOUNTER
Dr Gerard Patient    Calling back regarding prescription for Mybetric, please call her    Call Back Number  [phone]

## 2018-12-11 ENCOUNTER — TRANSCRIBE ORDERS (OUTPATIENT)
Dept: ADMINISTRATIVE | Facility: HOSPITAL | Age: 58
End: 2018-12-11

## 2018-12-11 DIAGNOSIS — Z12.31 VISIT FOR SCREENING MAMMOGRAM: Primary | ICD-10-CM

## 2019-01-25 ENCOUNTER — TRANSCRIBE ORDERS (OUTPATIENT)
Dept: ADMINISTRATIVE | Facility: HOSPITAL | Age: 59
End: 2019-01-25

## 2019-01-25 ENCOUNTER — HOSPITAL ENCOUNTER (OUTPATIENT)
Dept: GENERAL RADIOLOGY | Facility: HOSPITAL | Age: 59
Discharge: HOME | End: 2019-01-25
Admitting: NURSE PRACTITIONER

## 2019-01-25 DIAGNOSIS — M25.561 RIGHT KNEE PAIN, UNSPECIFIED CHRONICITY: Primary | ICD-10-CM

## 2019-01-25 DIAGNOSIS — M25.511 RIGHT SHOULDER PAIN, UNSPECIFIED CHRONICITY: ICD-10-CM

## 2019-01-25 PROCEDURE — 73030 X-RAY EXAM OF SHOULDER: CPT

## 2019-01-25 PROCEDURE — 73560 X-RAY EXAM OF KNEE 1 OR 2: CPT

## 2019-01-31 ENCOUNTER — HOSPITAL ENCOUNTER (OUTPATIENT)
Dept: MAMMOGRAPHY | Facility: HOSPITAL | Age: 59
Discharge: HOME | End: 2019-01-31
Admitting: NURSE PRACTITIONER

## 2019-01-31 DIAGNOSIS — Z12.31 VISIT FOR SCREENING MAMMOGRAM: ICD-10-CM

## 2019-01-31 PROCEDURE — 77063 BREAST TOMOSYNTHESIS BI: CPT

## 2019-01-31 PROCEDURE — 77067 SCR MAMMO BI INCL CAD: CPT

## 2019-01-31 PROCEDURE — 77067 SCR MAMMO BI INCL CAD: CPT | Performed by: RADIOLOGY

## 2019-01-31 PROCEDURE — 77063 BREAST TOMOSYNTHESIS BI: CPT | Performed by: RADIOLOGY

## 2019-07-30 ENCOUNTER — PREP FOR SURGERY (OUTPATIENT)
Dept: OTHER | Facility: HOSPITAL | Age: 59
End: 2019-07-30

## 2019-07-30 DIAGNOSIS — Z12.11 ENCOUNTER FOR SCREENING COLONOSCOPY: Primary | ICD-10-CM

## 2019-08-07 PROBLEM — Z12.11 ENCOUNTER FOR SCREENING COLONOSCOPY: Status: ACTIVE | Noted: 2019-08-07

## 2019-09-06 RX ORDER — SODIUM, POTASSIUM,MAG SULFATES 17.5-3.13G
SOLUTION, RECONSTITUTED, ORAL ORAL
Qty: 2 BOTTLE | Refills: 0 | Status: SHIPPED | OUTPATIENT
Start: 2019-09-06 | End: 2019-10-24 | Stop reason: SDUPTHER

## 2019-09-09 ENCOUNTER — APPOINTMENT (OUTPATIENT)
Dept: PREADMISSION TESTING | Facility: HOSPITAL | Age: 59
End: 2019-09-09

## 2019-09-09 VITALS — WEIGHT: 148.37 LBS | BODY MASS INDEX: 23.84 KG/M2 | HEIGHT: 66 IN

## 2019-09-09 LAB
DEPRECATED RDW RBC AUTO: 45.5 FL (ref 37–54)
ERYTHROCYTE [DISTWIDTH] IN BLOOD BY AUTOMATED COUNT: 11.9 % (ref 12.3–15.4)
HCT VFR BLD AUTO: 42 % (ref 34–46.6)
HGB BLD-MCNC: 13.9 G/DL (ref 12–15.9)
MCH RBC QN AUTO: 34.2 PG (ref 26.6–33)
MCHC RBC AUTO-ENTMCNC: 33.1 G/DL (ref 31.5–35.7)
MCV RBC AUTO: 103.4 FL (ref 79–97)
PLATELET # BLD AUTO: 315 10*3/MM3 (ref 140–450)
PMV BLD AUTO: 9.4 FL (ref 6–12)
POTASSIUM BLD-SCNC: 4.3 MMOL/L (ref 3.5–5.2)
RBC # BLD AUTO: 4.06 10*6/MM3 (ref 3.77–5.28)
WBC NRBC COR # BLD: 9.88 10*3/MM3 (ref 3.4–10.8)

## 2019-09-09 PROCEDURE — 93010 ELECTROCARDIOGRAM REPORT: CPT | Performed by: INTERNAL MEDICINE

## 2019-09-09 PROCEDURE — 85027 COMPLETE CBC AUTOMATED: CPT | Performed by: ANESTHESIOLOGY

## 2019-09-09 PROCEDURE — 84132 ASSAY OF SERUM POTASSIUM: CPT | Performed by: ANESTHESIOLOGY

## 2019-09-09 PROCEDURE — 36415 COLL VENOUS BLD VENIPUNCTURE: CPT

## 2019-09-09 PROCEDURE — 93005 ELECTROCARDIOGRAM TRACING: CPT

## 2019-09-09 RX ORDER — VENLAFAXINE HYDROCHLORIDE 150 MG/1
150 CAPSULE, EXTENDED RELEASE ORAL DAILY
COMMUNITY
End: 2021-12-01

## 2019-09-12 ENCOUNTER — ANESTHESIA EVENT (OUTPATIENT)
Dept: GASTROENTEROLOGY | Facility: HOSPITAL | Age: 59
End: 2019-09-12

## 2019-09-12 RX ORDER — FAMOTIDINE 20 MG/1
20 TABLET, FILM COATED ORAL
Status: CANCELLED | OUTPATIENT
Start: 2019-09-12

## 2019-09-13 ENCOUNTER — ANESTHESIA (OUTPATIENT)
Dept: GASTROENTEROLOGY | Facility: HOSPITAL | Age: 59
End: 2019-09-13

## 2019-09-13 ENCOUNTER — HOSPITAL ENCOUNTER (OUTPATIENT)
Facility: HOSPITAL | Age: 59
Setting detail: HOSPITAL OUTPATIENT SURGERY
Discharge: HOME | End: 2019-09-13
Attending: INTERNAL MEDICINE | Admitting: INTERNAL MEDICINE

## 2019-09-13 VITALS
WEIGHT: 148 LBS | HEART RATE: 79 BPM | BODY MASS INDEX: 23.78 KG/M2 | DIASTOLIC BLOOD PRESSURE: 82 MMHG | HEIGHT: 66 IN | RESPIRATION RATE: 16 BRPM | TEMPERATURE: 97 F | SYSTOLIC BLOOD PRESSURE: 119 MMHG | OXYGEN SATURATION: 93 %

## 2019-09-13 LAB — POTASSIUM BLD-SCNC: 3.8 MMOL/L (ref 3.5–5.2)

## 2019-09-13 PROCEDURE — S0260 H&P FOR SURGERY: HCPCS | Performed by: INTERNAL MEDICINE

## 2019-09-13 PROCEDURE — 25010000002 PROPOFOL 10 MG/ML EMULSION: Performed by: NURSE ANESTHETIST, CERTIFIED REGISTERED

## 2019-09-13 PROCEDURE — 84132 ASSAY OF SERUM POTASSIUM: CPT | Performed by: ANESTHESIOLOGY

## 2019-09-13 PROCEDURE — 25010000003 LIDOCAINE 1 % SOLUTION: Performed by: NURSE ANESTHETIST, CERTIFIED REGISTERED

## 2019-09-13 RX ORDER — PROPOFOL 10 MG/ML
VIAL (ML) INTRAVENOUS AS NEEDED
Status: DISCONTINUED | OUTPATIENT
Start: 2019-09-13 | End: 2019-09-13 | Stop reason: SURG

## 2019-09-13 RX ORDER — ONDANSETRON 2 MG/ML
4 INJECTION INTRAMUSCULAR; INTRAVENOUS ONCE AS NEEDED
Status: DISCONTINUED | OUTPATIENT
Start: 2019-09-13 | End: 2019-09-13 | Stop reason: HOSPADM

## 2019-09-13 RX ORDER — LIDOCAINE HYDROCHLORIDE 10 MG/ML
0.5 INJECTION, SOLUTION EPIDURAL; INFILTRATION; INTRACAUDAL; PERINEURAL ONCE AS NEEDED
Status: DISCONTINUED | OUTPATIENT
Start: 2019-09-13 | End: 2019-09-13 | Stop reason: HOSPADM

## 2019-09-13 RX ORDER — SODIUM CHLORIDE, SODIUM LACTATE, POTASSIUM CHLORIDE, CALCIUM CHLORIDE 600; 310; 30; 20 MG/100ML; MG/100ML; MG/100ML; MG/100ML
9 INJECTION, SOLUTION INTRAVENOUS CONTINUOUS PRN
Status: DISCONTINUED | OUTPATIENT
Start: 2019-09-13 | End: 2019-09-13 | Stop reason: HOSPADM

## 2019-09-13 RX ORDER — SODIUM CHLORIDE 0.9 % (FLUSH) 0.9 %
3 SYRINGE (ML) INJECTION EVERY 12 HOURS SCHEDULED
Status: DISCONTINUED | OUTPATIENT
Start: 2019-09-13 | End: 2019-09-13 | Stop reason: HOSPADM

## 2019-09-13 RX ORDER — FENTANYL CITRATE 50 UG/ML
50 INJECTION, SOLUTION INTRAMUSCULAR; INTRAVENOUS
Status: DISCONTINUED | OUTPATIENT
Start: 2019-09-13 | End: 2019-09-13 | Stop reason: HOSPADM

## 2019-09-13 RX ORDER — LIDOCAINE HYDROCHLORIDE 10 MG/ML
INJECTION, SOLUTION INFILTRATION; PERINEURAL AS NEEDED
Status: DISCONTINUED | OUTPATIENT
Start: 2019-09-13 | End: 2019-09-13 | Stop reason: SURG

## 2019-09-13 RX ORDER — FAMOTIDINE 10 MG/ML
20 INJECTION, SOLUTION INTRAVENOUS 2 TIMES DAILY
Status: DISCONTINUED | OUTPATIENT
Start: 2019-09-13 | End: 2019-09-13 | Stop reason: HOSPADM

## 2019-09-13 RX ORDER — SODIUM CHLORIDE 0.9 % (FLUSH) 0.9 %
3-10 SYRINGE (ML) INJECTION AS NEEDED
Status: DISCONTINUED | OUTPATIENT
Start: 2019-09-13 | End: 2019-09-13 | Stop reason: HOSPADM

## 2019-09-13 RX ADMIN — PROPOFOL 100 MG: 10 INJECTION, EMULSION INTRAVENOUS at 09:05

## 2019-09-13 RX ADMIN — PROPOFOL 20 MG: 10 INJECTION, EMULSION INTRAVENOUS at 09:07

## 2019-09-13 RX ADMIN — FAMOTIDINE 20 MG: 10 INJECTION, SOLUTION INTRAVENOUS at 07:55

## 2019-09-13 RX ADMIN — LIDOCAINE HYDROCHLORIDE 50 MG: 10 INJECTION, SOLUTION INFILTRATION; PERINEURAL at 09:04

## 2019-09-13 RX ADMIN — SODIUM CHLORIDE, POTASSIUM CHLORIDE, SODIUM LACTATE AND CALCIUM CHLORIDE: 600; 310; 30; 20 INJECTION, SOLUTION INTRAVENOUS at 09:01

## 2019-09-13 RX ADMIN — SODIUM CHLORIDE, POTASSIUM CHLORIDE, SODIUM LACTATE AND CALCIUM CHLORIDE 9 ML/HR: 600; 310; 30; 20 INJECTION, SOLUTION INTRAVENOUS at 07:50

## 2019-09-13 NOTE — H&P
HPI: Ms. Syed is a 60 yo with a history of cardiomyopathy, hypertension, COPD and colon polyps. She has a history of constipation. There is no blood in the stool. She denies any localized abdominal pain. There is no unexplained weight loss. She states her sister recently had recurrent colon cancer.     PMH: Hypertension            COPD            Long QT             Tobacco abuse    PSH: ICD placement           Hysterectomy           Bladder surgery    Fam hx: Colon cancer    Soc hx: Tobacco and marijuana use. No alcohol.  ROS: see HPI  Meds: see list    Phy Exam: Vitals- reviewed                     Heent- oropharynx clear, neck supple, no adenopathy                     Chest- clear                     Cardiac- s1s2, no gallop                     Abd- soft, bowel sounds present, no tenderness                     Ext- no edema, no cyanosis                     Neuro- awake, alert    Imp: Personal history of colon polyps  Plan: Will proceed with colonoscopy

## 2019-09-13 NOTE — ANESTHESIA PREPROCEDURE EVALUATION
Anesthesia Evaluation     Patient summary reviewed and Nursing notes reviewed   NPO Solid Status: > 8 hours  NPO Liquid Status: > 8 hours           Airway   Mallampati: I  TM distance: >3 FB  Neck ROM: full  No difficulty expected  Dental    (+) upper dentures    Pulmonary    (+) a smoker Current, COPD (MDI PRN),   (-) asthma, shortness of breath, recent URI  Cardiovascular     ECG reviewed    (+) pacemaker ICD, hypertension, past MI ,     ROS comment: EKG Atrial-paced rhythm Left axis deviation  Inferior infarct     Neuro/Psych  (+) seizures (isolated -related to phenergan ), headaches, dizziness/light headedness, psychiatric history (EEFFEXOR),     (-) CVA, numbness  GI/Hepatic/Renal/Endo    (-) liver disease, no renal disease, diabetes, hypothyroidism    Musculoskeletal     (+) back pain,   Abdominal    Substance History      OB/GYN          Other   (+) arthritis                   Anesthesia Plan    ASA 3     general   (PFL )  intravenous induction   Anesthetic plan, all risks, benefits, and alternatives have been provided, discussed and informed consent has been obtained with: patient.    Plan discussed with CRNA.

## 2019-09-13 NOTE — ANESTHESIA POSTPROCEDURE EVALUATION
Patient: Melita Syed    Procedure Summary     Date:  09/13/19 Room / Location:   [facility]    Anesthesia Start:  0901 Anesthesia Stop:      Procedure:  COLONOSCOPY (N/A ) Diagnosis:       Encounter for screening colonoscopy      (Encounter for screening colonoscopy [Z12.11])    Surgeon:  Marv Ramos MD Provider:  Leonel Saldivar MD    Anesthesia Type:  general ASA Status:  3          Anesthesia Type: general  Last vitals  BP   103/73 (09/13/19 0917)   Temp   97 °F (36.1 °C) (09/13/19 0915)   Pulse   81 (09/13/19 0917)   Resp   16 (09/13/19 0917)     SpO2   94 % (09/13/19 0917)     Post Anesthesia Care and Evaluation    Patient location during evaluation: PACU  Patient participation: complete - patient participated  Level of consciousness: awake  Pain management: adequate  Airway patency: patent  Anesthetic complications: No anesthetic complications  PONV Status: none  Cardiovascular status: acceptable  Respiratory status: acceptable  Hydration status: acceptable

## 2019-10-02 ENCOUNTER — PREP FOR SURGERY (OUTPATIENT)
Dept: OTHER | Facility: HOSPITAL | Age: 59
End: 2019-10-02

## 2019-10-02 DIAGNOSIS — Z12.11 SCREEN FOR COLON CANCER: Primary | ICD-10-CM

## 2019-10-03 PROBLEM — Z12.11 SCREEN FOR COLON CANCER: Status: ACTIVE | Noted: 2019-10-03

## 2019-10-21 ENCOUNTER — ANESTHESIA EVENT (OUTPATIENT)
Dept: GASTROENTEROLOGY | Facility: HOSPITAL | Age: 59
End: 2019-10-21

## 2019-10-24 RX ORDER — SODIUM, POTASSIUM,MAG SULFATES 17.5-3.13G
SOLUTION, RECONSTITUTED, ORAL ORAL
Qty: 2 BOTTLE | Refills: 0 | Status: SHIPPED | OUTPATIENT
Start: 2019-10-24 | End: 2020-01-28

## 2019-10-31 ENCOUNTER — ANESTHESIA (OUTPATIENT)
Dept: GASTROENTEROLOGY | Facility: HOSPITAL | Age: 59
End: 2019-10-31

## 2019-11-11 ENCOUNTER — OFFICE VISIT (OUTPATIENT)
Dept: OBSTETRICS AND GYNECOLOGY | Facility: CLINIC | Age: 59
End: 2019-11-11

## 2019-11-11 VITALS
DIASTOLIC BLOOD PRESSURE: 80 MMHG | BODY MASS INDEX: 23.95 KG/M2 | HEIGHT: 66 IN | WEIGHT: 149 LBS | SYSTOLIC BLOOD PRESSURE: 112 MMHG

## 2019-11-11 DIAGNOSIS — Z72.0 TOBACCO ABUSE: ICD-10-CM

## 2019-11-11 DIAGNOSIS — Z01.419 WOMEN'S ANNUAL ROUTINE GYNECOLOGICAL EXAMINATION: Primary | ICD-10-CM

## 2019-11-11 DIAGNOSIS — N95.2 VAGINAL ATROPHY: ICD-10-CM

## 2019-11-11 PROCEDURE — 99213 OFFICE O/P EST LOW 20 MIN: CPT | Performed by: OBSTETRICS & GYNECOLOGY

## 2019-11-11 PROCEDURE — 99396 PREV VISIT EST AGE 40-64: CPT | Performed by: OBSTETRICS & GYNECOLOGY

## 2019-11-11 RX ORDER — ESTRADIOL 0.1 MG/G
CREAM VAGINAL
Qty: 1 EACH | Refills: 12 | Status: SHIPPED | OUTPATIENT
Start: 2019-11-11 | End: 2019-12-11

## 2019-11-11 NOTE — PROGRESS NOTES
Subjective     Chief Complaint   Patient presents with   • Gynecologic Exam     dr jarrett pt. pap smear. c/o of cramping that has been going on for over a year now       Melita Syed is a 59 y.o. year old  presenting to be seen for her annual exam.      She is sexually active.  In the past 12 months there have not been new sexual partners.  Condoms are not typically used.  She would not like to be screened for STD's at today's exam.     She exercises regularly: no, reports that she is unable to exercise due to back pain and she [facility]  She wears her seat belt: yes.  She has concerns about domestic violence: no.  She has noticed changes in height: yes    GYN screening history:  · Last pap: she reports her last PAP was normal  · Last mammogram: she reports her last mammogram was normal  · Last colonoscopy: benign polyps. Strong family hx. Due for repeat now  · Last DEXA: osteopenis or osteoporosis addresed by PCP.    Additional Complaints:  Difficulty with intercourse.The patient had a hysterectomy in her late 20s.  She experienced menopause in her late 40s.  She has never been on estrogen.  The patient reports progressive difficulty with intercourse.  At this time she is unable to participate in intercourse due to the pain of intromission.  She has a history of hypertension, she smokes, and has had some type of heart damage.  We discussed in detail the use of vaginal estrogen.  It would seem to me that in view of the relatively small systemic absorption and the significant effect of the quality of life that the absence of intercourse has that she would be a reasonable candidate for estrogen used vaginally.  We discussed the use and expectations of this.  We discussed the risk of estrogen in general and those specific to her condition.  I believe these risks are outweighed by potential benefits.    The following portions of the patient's history were reviewed and updated as  "appropriate:vital signs, allergies, current medications, past medical history, past social history, past surgical history and problem list.    Review of Systems  Pertinent items are noted in HPI.     Physical Exam    Objective     /80   Ht 167.6 cm (65.98\")   Wt 67.6 kg (149 lb)   LMP 09/12/1989 (LMP Unknown)   Breastfeeding? No   BMI 24.06 kg/m²     General:  well developed; well nourished  no acute distress   Constitutional:  alert   Skin:  No suspicious lesions seen   Thyroid: normal to inspection and palpation   Lungs:  breathing is unlabored  clear to auscultation bilaterally   Heart:  regular rate and rhythm, S1, S2 normal, no murmur, click, rub or gallop   Breasts:  Examined in supine position  Symmetric without masses or skin dimpling  Nipples normal without inversion, lesions or discharge  There are no palpable axillary nodes   Abdomen: soft, non-tender; no masses  no umbilical or inginual hernias are present  no hepato-splenomegaly   Pelvis: Exam limited by  pain  Clinical staff was present for exam  External genitalia:  normal appearance of the external genitalia including Bartholin's and [place]'s glands.  :  urethral meatus normal; urethral hypermobility is absent.  Vaginal:  atrophic mucosal changes are present;  Cervix:  absent  Uterus:  absent  Adnexa:  normal bimanual exam of the adnexa.   Musculoskeletal: negative   Neuro: normal without focal findings, mental status, speech normal, alert and oriented x3 and SAMANTHA   Psych: oriented to time, place and person, mood and affect are within normal limits, pt is a good historian; no memory problems were noted       Lab Review   No data reviewed    Imaging  Mammogram report    Assessment/Plan     ASSESSMENT  1. Women's annual routine gynecological examination    2. Tobacco abuse    3. Vaginal atrophy   In addition to the annual gynecologic exam the patient presented with a problem, vaginal atrophy and pain with intercourse.  An additional 25 " minutes was devoted to the evaluation and management of vaginal atrophy.  This is in the context of numerous stable health issues including cardiovascular disease and smoking.  The risk of topical/vaginal estrogen was reviewed and the potential benefits to her quality of life were also discussed.  We will initiate a trial of vaginal estrogen and reassess this in 6 weeks.       PLAN  No orders of the defined types were placed in this encounter.    New Medications Ordered This Visit   Medications   • estradiol (ESTRACE VAGINAL) 0.1 MG/GM vaginal cream     Sig: Insert 1 gm intravaginally 1-3 times each week     Dispense:  1 each     Refill:  12         Follow up: 6 week(s)         This note was electronically signed.    Natalio Castro MD  November 11, 2019

## 2019-11-19 ENCOUNTER — TELEPHONE (OUTPATIENT)
Dept: OBSTETRICS AND GYNECOLOGY | Facility: CLINIC | Age: 59
End: 2019-11-19

## 2019-11-19 NOTE — TELEPHONE ENCOUNTER
----- Message from Natalio Castro MD sent at 11/18/2019  4:53 PM EST -----      ----- Message -----  From: Fadumo Bashir RegSched Rep  Sent: 11/18/2019   3:50 PM  To: Natalio Castro MD

## 2019-12-11 ENCOUNTER — APPOINTMENT (OUTPATIENT)
Dept: PREADMISSION TESTING | Facility: HOSPITAL | Age: 59
End: 2019-12-11

## 2019-12-11 VITALS — BODY MASS INDEX: 23.67 KG/M2 | WEIGHT: 147.27 LBS | HEIGHT: 66 IN

## 2019-12-11 LAB
DEPRECATED RDW RBC AUTO: 45.9 FL (ref 37–54)
ERYTHROCYTE [DISTWIDTH] IN BLOOD BY AUTOMATED COUNT: 11.9 % (ref 12.3–15.4)
HCT VFR BLD AUTO: 43.4 % (ref 34–46.6)
HGB BLD-MCNC: 14.6 G/DL (ref 12–15.9)
MCH RBC QN AUTO: 35 PG (ref 26.6–33)
MCHC RBC AUTO-ENTMCNC: 33.6 G/DL (ref 31.5–35.7)
MCV RBC AUTO: 104.1 FL (ref 79–97)
PLATELET # BLD AUTO: 273 10*3/MM3 (ref 140–450)
PMV BLD AUTO: 10 FL (ref 6–12)
POTASSIUM BLD-SCNC: 3.5 MMOL/L (ref 3.5–5.2)
RBC # BLD AUTO: 4.17 10*6/MM3 (ref 3.77–5.28)
WBC NRBC COR # BLD: 10.47 10*3/MM3 (ref 3.4–10.8)

## 2019-12-11 PROCEDURE — 84132 ASSAY OF SERUM POTASSIUM: CPT | Performed by: ANESTHESIOLOGY

## 2019-12-11 PROCEDURE — 36415 COLL VENOUS BLD VENIPUNCTURE: CPT

## 2019-12-11 PROCEDURE — 85027 COMPLETE CBC AUTOMATED: CPT | Performed by: ANESTHESIOLOGY

## 2019-12-11 RX ORDER — TRAZODONE HYDROCHLORIDE 100 MG/1
100 TABLET ORAL NIGHTLY
COMMUNITY
End: 2020-01-28

## 2019-12-13 ENCOUNTER — DOCUMENTATION (OUTPATIENT)
Dept: GASTROENTEROLOGY | Facility: CLINIC | Age: 59
End: 2019-12-13

## 2019-12-13 ENCOUNTER — HOSPITAL ENCOUNTER (OUTPATIENT)
Facility: HOSPITAL | Age: 59
Setting detail: HOSPITAL OUTPATIENT SURGERY
Discharge: HOME | End: 2019-12-13
Attending: INTERNAL MEDICINE | Admitting: INTERNAL MEDICINE

## 2019-12-13 VITALS
DIASTOLIC BLOOD PRESSURE: 75 MMHG | RESPIRATION RATE: 16 BRPM | BODY MASS INDEX: 22.82 KG/M2 | OXYGEN SATURATION: 92 % | SYSTOLIC BLOOD PRESSURE: 101 MMHG | HEART RATE: 85 BPM | WEIGHT: 142 LBS | TEMPERATURE: 97 F | HEIGHT: 66 IN

## 2019-12-13 PROCEDURE — 25010000002 PROPOFOL 10 MG/ML EMULSION: Performed by: NURSE ANESTHETIST, CERTIFIED REGISTERED

## 2019-12-13 RX ORDER — HYDROCODONE BITARTRATE AND ACETAMINOPHEN 5; 325 MG/1; MG/1
1 TABLET ORAL ONCE AS NEEDED
Status: DISCONTINUED | OUTPATIENT
Start: 2019-12-13 | End: 2019-12-13 | Stop reason: HOSPADM

## 2019-12-13 RX ORDER — FENTANYL CITRATE 50 UG/ML
50 INJECTION, SOLUTION INTRAMUSCULAR; INTRAVENOUS
Status: DISCONTINUED | OUTPATIENT
Start: 2019-12-13 | End: 2019-12-13 | Stop reason: HOSPADM

## 2019-12-13 RX ORDER — LIDOCAINE HYDROCHLORIDE 10 MG/ML
0.5 INJECTION, SOLUTION EPIDURAL; INFILTRATION; INTRACAUDAL; PERINEURAL ONCE AS NEEDED
Status: DISCONTINUED | OUTPATIENT
Start: 2019-12-13 | End: 2019-12-13 | Stop reason: HOSPADM

## 2019-12-13 RX ORDER — SODIUM CHLORIDE 0.9 % (FLUSH) 0.9 %
3 SYRINGE (ML) INJECTION EVERY 12 HOURS SCHEDULED
Status: DISCONTINUED | OUTPATIENT
Start: 2019-12-13 | End: 2019-12-13 | Stop reason: HOSPADM

## 2019-12-13 RX ORDER — FAMOTIDINE 20 MG/1
20 TABLET, FILM COATED ORAL ONCE
Status: DISCONTINUED | OUTPATIENT
Start: 2019-12-13 | End: 2019-12-13 | Stop reason: HOSPADM

## 2019-12-13 RX ORDER — SODIUM CHLORIDE 0.9 % (FLUSH) 0.9 %
10 SYRINGE (ML) INJECTION AS NEEDED
Status: DISCONTINUED | OUTPATIENT
Start: 2019-12-13 | End: 2019-12-13 | Stop reason: HOSPADM

## 2019-12-13 RX ORDER — FAMOTIDINE 20 MG/1
20 TABLET, FILM COATED ORAL
Status: DISCONTINUED | OUTPATIENT
Start: 2019-12-13 | End: 2019-12-13 | Stop reason: HOSPADM

## 2019-12-13 RX ORDER — SODIUM CHLORIDE 0.9 % (FLUSH) 0.9 %
3-10 SYRINGE (ML) INJECTION AS NEEDED
Status: DISCONTINUED | OUTPATIENT
Start: 2019-12-13 | End: 2019-12-13 | Stop reason: HOSPADM

## 2019-12-13 RX ORDER — NALOXONE HCL 0.4 MG/ML
0.4 VIAL (ML) INJECTION AS NEEDED
Status: DISCONTINUED | OUTPATIENT
Start: 2019-12-13 | End: 2019-12-13 | Stop reason: HOSPADM

## 2019-12-13 RX ORDER — ONDANSETRON 2 MG/ML
4 INJECTION INTRAMUSCULAR; INTRAVENOUS ONCE AS NEEDED
Status: DISCONTINUED | OUTPATIENT
Start: 2019-12-13 | End: 2019-12-13 | Stop reason: HOSPADM

## 2019-12-13 RX ORDER — LABETALOL HYDROCHLORIDE 5 MG/ML
5 INJECTION, SOLUTION INTRAVENOUS
Status: DISCONTINUED | OUTPATIENT
Start: 2019-12-13 | End: 2019-12-13 | Stop reason: HOSPADM

## 2019-12-13 RX ORDER — PROPOFOL 10 MG/ML
VIAL (ML) INTRAVENOUS AS NEEDED
Status: DISCONTINUED | OUTPATIENT
Start: 2019-12-13 | End: 2019-12-13 | Stop reason: SURG

## 2019-12-13 RX ORDER — SODIUM CHLORIDE, SODIUM LACTATE, POTASSIUM CHLORIDE, CALCIUM CHLORIDE 600; 310; 30; 20 MG/100ML; MG/100ML; MG/100ML; MG/100ML
9 INJECTION, SOLUTION INTRAVENOUS CONTINUOUS PRN
Status: DISCONTINUED | OUTPATIENT
Start: 2019-12-13 | End: 2019-12-13 | Stop reason: HOSPADM

## 2019-12-13 RX ORDER — SODIUM CHLORIDE, SODIUM LACTATE, POTASSIUM CHLORIDE, CALCIUM CHLORIDE 600; 310; 30; 20 MG/100ML; MG/100ML; MG/100ML; MG/100ML
9 INJECTION, SOLUTION INTRAVENOUS CONTINUOUS
Status: DISCONTINUED | OUTPATIENT
Start: 2019-12-13 | End: 2019-12-13 | Stop reason: HOSPADM

## 2019-12-13 RX ORDER — HYDRALAZINE HYDROCHLORIDE 20 MG/ML
5 INJECTION INTRAMUSCULAR; INTRAVENOUS
Status: DISCONTINUED | OUTPATIENT
Start: 2019-12-13 | End: 2019-12-13 | Stop reason: HOSPADM

## 2019-12-13 RX ORDER — MEPERIDINE HYDROCHLORIDE 50 MG/ML
12.5 INJECTION INTRAMUSCULAR; INTRAVENOUS; SUBCUTANEOUS
Status: DISCONTINUED | OUTPATIENT
Start: 2019-12-13 | End: 2019-12-13 | Stop reason: HOSPADM

## 2019-12-13 RX ORDER — LIDOCAINE HYDROCHLORIDE 10 MG/ML
INJECTION, SOLUTION EPIDURAL; INFILTRATION; INTRACAUDAL; PERINEURAL AS NEEDED
Status: DISCONTINUED | OUTPATIENT
Start: 2019-12-13 | End: 2019-12-13 | Stop reason: SURG

## 2019-12-13 RX ORDER — IPRATROPIUM BROMIDE AND ALBUTEROL SULFATE 2.5; .5 MG/3ML; MG/3ML
3 SOLUTION RESPIRATORY (INHALATION) ONCE AS NEEDED
Status: DISCONTINUED | OUTPATIENT
Start: 2019-12-13 | End: 2019-12-13 | Stop reason: HOSPADM

## 2019-12-13 RX ORDER — SODIUM CHLORIDE 0.9 % (FLUSH) 0.9 %
10 SYRINGE (ML) INJECTION EVERY 12 HOURS SCHEDULED
Status: DISCONTINUED | OUTPATIENT
Start: 2019-12-13 | End: 2019-12-13 | Stop reason: HOSPADM

## 2019-12-13 RX ORDER — FAMOTIDINE 10 MG/ML
20 INJECTION, SOLUTION INTRAVENOUS ONCE
Status: COMPLETED | OUTPATIENT
Start: 2019-12-13 | End: 2019-12-13

## 2019-12-13 RX ADMIN — PROPOFOL 60 MG: 10 INJECTION, EMULSION INTRAVENOUS at 08:27

## 2019-12-13 RX ADMIN — SODIUM CHLORIDE, POTASSIUM CHLORIDE, SODIUM LACTATE AND CALCIUM CHLORIDE 9 ML/HR: 600; 310; 30; 20 INJECTION, SOLUTION INTRAVENOUS at 07:17

## 2019-12-13 RX ADMIN — PROPOFOL 130 MCG/KG/MIN: 10 INJECTION, EMULSION INTRAVENOUS at 08:27

## 2019-12-13 RX ADMIN — SODIUM CHLORIDE, POTASSIUM CHLORIDE, SODIUM LACTATE AND CALCIUM CHLORIDE: 600; 310; 30; 20 INJECTION, SOLUTION INTRAVENOUS at 08:24

## 2019-12-13 RX ADMIN — FAMOTIDINE 20 MG: 10 INJECTION INTRAVENOUS at 07:18

## 2019-12-13 RX ADMIN — LIDOCAINE HYDROCHLORIDE 50 MG: 10 INJECTION, SOLUTION EPIDURAL; INFILTRATION; INTRACAUDAL; PERINEURAL at 08:27

## 2019-12-13 NOTE — ANESTHESIA PREPROCEDURE EVALUATION
Anesthesia Evaluation     Patient summary reviewed and Nursing notes reviewed   no history of anesthetic complications:  NPO Solid Status: > 8 hours  NPO Liquid Status: > 8 hours           Airway   Mallampati: II  TM distance: >3 FB  Neck ROM: full  No difficulty expected  Dental      Pulmonary - normal exam   (+) a smoker, COPD,   Cardiovascular - normal exam    (+) hypertension, past MI ,       Neuro/Psych  (+) seizures, headaches,     GI/Hepatic/Renal/Endo      Musculoskeletal     (+) back pain,   Abdominal    Substance History      OB/GYN          Other   arthritis,                      Anesthesia Plan    ASA 3     general     intravenous induction     Anesthetic plan, all risks, benefits, and alternatives have been provided, discussed and informed consent has been obtained with: patient.    Plan discussed with CRNA.

## 2019-12-13 NOTE — H&P
HPI:  is a 60 yo with a history of colon polyps. There is a family history of colon cancer. She denies blood in the stool. There is no change in bowel habits.    PMH: Cardiomyopathy            Colon polyps            Hypertension  PSH: Defibrillator    All: SEE LIST  Meds; SEE LIST  Fam Hx: Colon cancer  Soc Hx: No alcohol, positive tobacco  ROS: see HPI  Phy Exam: Gen- pleasant female, no distress                     Heent- oropharynx clear,                     Chest- clear to auscultation                     Cardiac- s1s2, no gallop                     Abd- soft, bowel sounds present                     Ext- no edema    Imp: History of colon polyps  Plan: Will proceed with colonoscopy

## 2019-12-13 NOTE — ANESTHESIA POSTPROCEDURE EVALUATION
Patient: Melita Syed    Procedure Summary     Date:  12/13/19 Room / Location:   [facility]    Anesthesia Start:  0823 Anesthesia Stop:  0900    Procedure:  COLONOSCOPY (N/A ) Diagnosis:       Screen for colon cancer      (Screen for colon cancer [Z12.11])    Surgeon:  Marv Ramos MD Provider:  Max Callahan MD    Anesthesia Type:  general ASA Status:  3          Anesthesia Type: general    Vitals  No vitals data found for the desired time range.          Post Anesthesia Care and Evaluation    Patient location during evaluation: PACU  Patient participation: complete - patient participated  Level of consciousness: sleepy but conscious  Pain score: 0  Pain management: adequate  Airway patency: patent  Anesthetic complications: No anesthetic complications  PONV Status: none  Cardiovascular status: hemodynamically stable and acceptable  Respiratory status: nonlabored ventilation, acceptable and nasal cannula  Hydration status: acceptable

## 2020-01-13 ENCOUNTER — TRANSCRIBE ORDERS (OUTPATIENT)
Dept: ADMINISTRATIVE | Facility: HOSPITAL | Age: 60
End: 2020-01-13

## 2020-01-13 DIAGNOSIS — Z12.31 ENCOUNTER FOR SCREENING MAMMOGRAM FOR MALIGNANT NEOPLASM OF BREAST: Primary | ICD-10-CM

## 2020-02-21 ENCOUNTER — HOSPITAL ENCOUNTER (OUTPATIENT)
Dept: MAMMOGRAPHY | Facility: HOSPITAL | Age: 60
Discharge: HOME | End: 2020-02-21
Admitting: NURSE PRACTITIONER

## 2020-02-21 DIAGNOSIS — Z12.31 ENCOUNTER FOR SCREENING MAMMOGRAM FOR MALIGNANT NEOPLASM OF BREAST: ICD-10-CM

## 2020-02-21 PROCEDURE — 77067 SCR MAMMO BI INCL CAD: CPT

## 2020-02-21 PROCEDURE — 77063 BREAST TOMOSYNTHESIS BI: CPT

## 2020-05-28 ENCOUNTER — HOSPITAL ENCOUNTER (OUTPATIENT)
Dept: GENERAL RADIOLOGY | Facility: HOSPITAL | Age: 60
Discharge: HOME | End: 2020-05-28
Admitting: NURSE PRACTITIONER

## 2020-05-28 ENCOUNTER — TRANSCRIBE ORDERS (OUTPATIENT)
Dept: GENERAL RADIOLOGY | Facility: HOSPITAL | Age: 60
End: 2020-05-28

## 2020-05-28 DIAGNOSIS — M25.512 LOCALIZED PAIN OF LEFT SHOULDER JOINT: ICD-10-CM

## 2020-05-28 DIAGNOSIS — R10.9 ABDOMINAL PAIN, UNSPECIFIED ABDOMINAL LOCATION: Primary | ICD-10-CM

## 2020-05-28 PROCEDURE — 73030 X-RAY EXAM OF SHOULDER: CPT

## 2021-04-05 ENCOUNTER — TREATMENT (OUTPATIENT)
Dept: PHYSICAL THERAPY | Facility: CLINIC | Age: 61
End: 2021-04-05

## 2021-04-05 DIAGNOSIS — M54.41 CHRONIC BILATERAL LOW BACK PAIN WITH BILATERAL SCIATICA: Primary | ICD-10-CM

## 2021-04-05 DIAGNOSIS — M54.42 CHRONIC BILATERAL LOW BACK PAIN WITH BILATERAL SCIATICA: Primary | ICD-10-CM

## 2021-04-05 DIAGNOSIS — G89.29 CHRONIC BILATERAL LOW BACK PAIN WITH BILATERAL SCIATICA: Primary | ICD-10-CM

## 2021-04-05 PROCEDURE — 97530 THERAPEUTIC ACTIVITIES: CPT | Performed by: PHYSICAL THERAPIST

## 2021-04-05 PROCEDURE — 97161 PT EVAL LOW COMPLEX 20 MIN: CPT | Performed by: PHYSICAL THERAPIST

## 2021-04-05 PROCEDURE — 97110 THERAPEUTIC EXERCISES: CPT | Performed by: PHYSICAL THERAPIST

## 2021-04-05 NOTE — PROGRESS NOTES
Physical Therapy Initial Evaluation and Plan of Care      Patient: Melita Syed   : 1960  Diagnosis/ICD-10 Code:  Chronic bilateral low back pain without sciatica [M54.5, G89.29]  Referring practitioner: Alex Medina, *    Subjective Evaluation    History of Present Illness  Mechanism of injury: 2006     Surgery  for the back.      Surgery 2020 Rods placement.  Lumbar spine.      Lumbar pain centrally and into the legs.        Patient Occupation: NA Pain  Current pain ratin  Location: hips and back.  legs   Quality: dull ache, sharp and pressure  Alleviating factors: sidelying.  Aggravating factors: ambulation  Progression: improved (Slow progress.  )             Objective          Palpation   Left   Hypertonic in the gluteus rose, gluteus medius and piriformis.   Hypotonic in the erector spinae and lumbar paraspinals.   Tenderness of the gluteus rose, gluteus medius and piriformis.     Right   Hypertonic in the gluteus rose, gluteus medius and piriformis.   Hypotonic in the erector spinae and lumbar paraspinals. Tenderness of the gluteus rose, gluteus medius and piriformis.     Additional Palpation Details  Excessive guarding and hypersensitivity noted in the glutes.      Neurological Testing     Sensation     Lumbar   Left   Intact: light touch    Right   Intact: light touch    Reflexes   Left   Patellar (L4): normal (2+)    Right   Patellar (L4): normal (2+)    Ambulation     Comments   Slight trunk shift and leg length difference.      General Comments     Hip Comments   Prone lying seemed to centralize all pain into the lumbar spine.     Lumbar Comments  Prone lying 5/10 - relief.           Assessment & Plan     Assessment  Impairments: abnormal muscle tone, abnormal or restricted ROM, activity intolerance, lacks appropriate home exercise program and pain with function  Assessment details: Patient is a 60 year old female who comes to physical therapy with  chronic LBP and recent history of lumbar surgery with tamia placement. Signs and symptoms are consistent with diagnosis.  The patient currently has pain, decreased ROM, decreased strength, and inability to perform all essential functional activities. Pt will benefit from skilled PT services to address the above issues.     Prognosis: fair  Functional Limitations: carrying objects, lifting, pulling, pushing, uncomfortable because of pain, sitting, standing, stooping and unable to perform repetitive tasks  Goals  Plan Goals: SHORT TERM GOALS:     2 weeks  1. Pt independent with HEP  2. Pt to demonstrate trunk AROM 25-50% of expected norms to allow for improved ability to perform ADL's  3. Pt to demonstrate bilateral hip strength 4/5 in all planes to improved stability of the core/trunk     LONG TERM GOALS:   6 weeks  1. Pt to demonstrate trunk AROM 50-75% of expected norms to allow for improved ability to perform functional activities  2. Pt to demonstrate ability to perform full functional squat with good form and without increased pain in the low back   3. Pt to report being able to work full shift or work in the home without increase in pain in the back  4. Pt to report cessation of pain/numbness/tingling into the bilateral leg to show decreased nerve compression      Plan  Therapy options: will be seen for skilled physical therapy services  Planned modality interventions: cryotherapy, thermotherapy (hydrocollator packs) and electrical stimulation/Russian stimulation  Planned therapy interventions: abdominal trunk stabilization, manual therapy, spinal/joint mobilization, soft tissue mobilization, strengthening, stretching, therapeutic activities, functional ROM exercises, flexibility, body mechanics training, neuromuscular re-education, postural training and home exercise program  Treatment plan discussed with: patient  Plan details: Pt will be seen 2 x / week.  Assess Pt response to PREP, posture and body mechanics.          Manual Therapy:         mins  27105;  Therapeutic Exercise:    12     mins  32023;     Neuromuscular Love:        mins  83094;    Therapeutic Activity:     11     mins  18029;     Gait Training:           mins  76728;     Ultrasound:          mins  43881;    Electrical Stimulation:         mins  56907 ( );  Dry Needling          mins self-pay    Timed Treatment:   23   mins   Total Treatment:     46   mins    PT SIGNATURE: Perfecto Thomas, PT   DATE TREATMENT INITIATED: 4/5/2021    Medicare Initial Certification  Certification Period: 7/4/2021  I certify that the therapy services are furnished while this patient is under my care.  The services outlined above are required by this patient, and will be reviewed every 90 days.     PHYSICIAN: Alex Medina MD      DATE:     Please sign and return via fax to  .. Thank you, [facility].

## 2021-04-12 ENCOUNTER — TREATMENT (OUTPATIENT)
Dept: PHYSICAL THERAPY | Facility: CLINIC | Age: 61
End: 2021-04-12

## 2021-04-12 DIAGNOSIS — M54.41 CHRONIC BILATERAL LOW BACK PAIN WITH BILATERAL SCIATICA: Primary | ICD-10-CM

## 2021-04-12 DIAGNOSIS — M54.42 CHRONIC BILATERAL LOW BACK PAIN WITH BILATERAL SCIATICA: Primary | ICD-10-CM

## 2021-04-12 DIAGNOSIS — G89.29 CHRONIC BILATERAL LOW BACK PAIN WITH BILATERAL SCIATICA: Primary | ICD-10-CM

## 2021-04-12 PROCEDURE — 97530 THERAPEUTIC ACTIVITIES: CPT | Performed by: PHYSICAL THERAPIST

## 2021-04-12 PROCEDURE — 97110 THERAPEUTIC EXERCISES: CPT | Performed by: PHYSICAL THERAPIST

## 2021-04-12 NOTE — PROGRESS NOTES
Physical Therapy Daily Progress Note    Patient Information  Melita Syed  1960      Visit # : 2     Melita Syed reports 5/10 pain today at rest.  Pt with B LBP and hip LE pain.           Objective Pt presents to PT today with minimal distress noted.     Pt was able to perform more activity with less guarding and less distress as compared to her first visit.     The Lumbar spine is still noted to have significant atrophy in the PS MM.     The addition of new activity did not seem to elevate pain.     See Exercise, Manual, and Modality Logs for complete treatment.     Assessment/Plan  Pt with improvement.  She needs to add more strengthening activity without elevating pain.       Progress per Plan of Care and Progress strengthening /stabilization /functional activity  Check response to new HEP.     Visit Diagnoses:    ICD-10-CM ICD-9-CM   1. Chronic bilateral low back pain with bilateral sciatica  M54.42 724.2    M54.41 724.3    G89.29 338.29            Manual Therapy:         mins  30184;  Therapeutic Exercise:    28     mins  86542;     Neuromuscular Love:        mins  35533;    Therapeutic Activity:     10     mins  81471;     Gait Training:           mins  12542;     Ultrasound:          mins  15583;    Electrical Stimulation:         mins  28504 ( );  Dry Needling          mins self-pay    Timed Treatment:   38   mins   Total Treatment:     38   mins          Perfecto Thomas, PT  Physical Therapist

## 2021-04-15 ENCOUNTER — TREATMENT (OUTPATIENT)
Dept: PHYSICAL THERAPY | Facility: CLINIC | Age: 61
End: 2021-04-15

## 2021-04-15 DIAGNOSIS — M54.41 CHRONIC BILATERAL LOW BACK PAIN WITH BILATERAL SCIATICA: Primary | ICD-10-CM

## 2021-04-15 DIAGNOSIS — G89.29 CHRONIC BILATERAL LOW BACK PAIN WITH BILATERAL SCIATICA: Primary | ICD-10-CM

## 2021-04-15 DIAGNOSIS — M54.42 CHRONIC BILATERAL LOW BACK PAIN WITH BILATERAL SCIATICA: Primary | ICD-10-CM

## 2021-04-15 PROCEDURE — 97140 MANUAL THERAPY 1/> REGIONS: CPT | Performed by: PHYSICAL THERAPIST

## 2021-04-15 PROCEDURE — 97110 THERAPEUTIC EXERCISES: CPT | Performed by: PHYSICAL THERAPIST

## 2021-04-15 PROCEDURE — 97530 THERAPEUTIC ACTIVITIES: CPT | Performed by: PHYSICAL THERAPIST

## 2021-04-15 NOTE — PROGRESS NOTES
Physical Therapy Daily Progress Note    Patient Information  Melita Syed  1960      Visit # : 3    Melita Syed reports 8/10 pain today at rest.  Pt reported feeling okay after the last appointment but had increased B LBP and hip pain after gardening on Tuesday.        Objective Pt presents to PT today with moderate distress noted.    STM to paraspinals decreased pain to a 6/10 and felt less tension in the MM. Lumbar spine still noted to have atrophy in the PS MM. Pt was able to preform strengthening activities without an increase in pain.    See Exercise, Manual, and Modality Logs for complete treatment.     Assessment/Plan  Pt responded well to STM and was able to preform strengthening exercises without an increase in pain. She needs to continue to add strengthening activities without an increase in pain.      Progress per Plan of Care and Progress strengthening /stabilization /functional activity  Check response to the STM.     Visit Diagnoses:  No diagnosis found.         Manual Therapy:    23     mins  56584;  Therapeutic Exercise:    15     mins  57099;     Neuromuscular Love:        mins  72267;    Therapeutic Activity:     17     mins  44010;     Gait Training:           mins  68365;     Ultrasound:          mins  38258;    Electrical Stimulation:         mins  11223 ( );  Dry Needling          mins self-pay    Timed Treatment:   53   mins   Total Treatment:     55   mins          Perfecto Thomas, PT  Physical Therapist

## 2021-04-19 ENCOUNTER — TREATMENT (OUTPATIENT)
Dept: PHYSICAL THERAPY | Facility: CLINIC | Age: 61
End: 2021-04-19

## 2021-04-19 DIAGNOSIS — M54.41 CHRONIC BILATERAL LOW BACK PAIN WITH BILATERAL SCIATICA: Primary | ICD-10-CM

## 2021-04-19 DIAGNOSIS — M54.42 CHRONIC BILATERAL LOW BACK PAIN WITH BILATERAL SCIATICA: Primary | ICD-10-CM

## 2021-04-19 DIAGNOSIS — G89.29 CHRONIC BILATERAL LOW BACK PAIN WITH BILATERAL SCIATICA: Primary | ICD-10-CM

## 2021-04-19 PROCEDURE — 97110 THERAPEUTIC EXERCISES: CPT | Performed by: PHYSICAL THERAPIST

## 2021-04-19 PROCEDURE — 97530 THERAPEUTIC ACTIVITIES: CPT | Performed by: PHYSICAL THERAPIST

## 2021-04-19 NOTE — PROGRESS NOTES
Physical Therapy Daily Progress Note    Patient Information  Melita Syed  1960      Visit # : 4    Melita Syed reports 7/10 pain today at rest.  Pt reports that she felt better after last visit up until a 1/2 hour later and has gotten worse since. Needle like feeling and burning in bilateral LB. Sciatic pain down L LE.         Objective Pt presents to PT today with moderate distress. Hypersensitive to touch across low back.    SKTC caused sharp pain in back. Quadruped rocking caused pain to decrease to 6/10.      See Exercise, Manual, and Modality Logs for complete treatment.     Assessment/Plan  Pt was able to complete exercises with no increase in pain. Added 3 exercises to HEP.      Progress per Plan of Care and Progress strengthening /stabilization /functional activity  Assess new exercises introduced into HEP.    Visit Diagnoses:    ICD-10-CM ICD-9-CM   1. Chronic bilateral low back pain with bilateral sciatica  M54.42 724.2    M54.41 724.3    G89.29 338.29            Manual Therapy:         mins  87626;  Therapeutic Exercise:    25     mins  85386;     Neuromuscular Love:        mins  40057;    Therapeutic Activity:     15     mins  95599;     Gait Training:           mins  28582;     Ultrasound:          mins  50007;    Electrical Stimulation:         mins  73147 ( );  Dry Needling          mins self-pay    Timed Treatment:   40   mins   Total Treatment:     50   mins          Perfecto Thomas, PT  Physical Therapist

## 2021-04-22 ENCOUNTER — TELEPHONE (OUTPATIENT)
Dept: PHYSICAL THERAPY | Facility: CLINIC | Age: 61
End: 2021-04-22

## 2021-04-27 ENCOUNTER — TREATMENT (OUTPATIENT)
Dept: PHYSICAL THERAPY | Facility: CLINIC | Age: 61
End: 2021-04-27

## 2021-04-27 DIAGNOSIS — M54.41 CHRONIC BILATERAL LOW BACK PAIN WITH BILATERAL SCIATICA: Primary | ICD-10-CM

## 2021-04-27 DIAGNOSIS — G89.29 CHRONIC BILATERAL LOW BACK PAIN WITH BILATERAL SCIATICA: Primary | ICD-10-CM

## 2021-04-27 DIAGNOSIS — M54.42 CHRONIC BILATERAL LOW BACK PAIN WITH BILATERAL SCIATICA: Primary | ICD-10-CM

## 2021-04-27 PROCEDURE — 97530 THERAPEUTIC ACTIVITIES: CPT | Performed by: PHYSICAL THERAPIST

## 2021-04-27 PROCEDURE — 97110 THERAPEUTIC EXERCISES: CPT | Performed by: PHYSICAL THERAPIST

## 2021-04-27 NOTE — PROGRESS NOTES
Physical Therapy Daily Progress Note    Patient Information  Melita Syed  1960      Visit # : 5    Melita Syed reports 9/10 pain today at rest.  Pt sees doctor on Thursday. Pt reports that her BP has been low.        Objective Pt presents to PT today with moderate distress noted.  She is noted to be slightly lethargic and dizzy with her PT activity.      BP 96/70    82/62 after treatment session.      Pt was struggling to perform PT activity today due to fatigue and pain.  She did seem to have a few episodes of dizziness as well.      I encouraged her to call someone to pick her up but she refused.     See Exercise, Manual, and Modality Logs for complete treatment.     Assessment/Plan  Pt with blood pressure low and her overall activity level less today.  She is noted to have elevated reports of pain today.        Progress per Plan of Care  Await MD visit.  Recheck BP next visit.     Visit Diagnoses:    ICD-10-CM ICD-9-CM   1. Chronic bilateral low back pain with bilateral sciatica  M54.42 724.2    M54.41 724.3    G89.29 338.29            Manual Therapy:         mins  19535;  Therapeutic Exercise:    15     mins  83431;     Neuromuscular Love:        mins  23449;    Therapeutic Activity:     12     mins  78983;     Gait Training:           mins  19697;     Ultrasound:          mins  55317;    Electrical Stimulation:         mins  89446 ( );  Dry Needling          mins self-pay    Timed Treatment:   27   mins   Total Treatment:     27   mins          Perfecto Thomas, PT  Physical Therapist

## 2021-04-29 ENCOUNTER — TREATMENT (OUTPATIENT)
Dept: PHYSICAL THERAPY | Facility: CLINIC | Age: 61
End: 2021-04-29

## 2021-04-29 DIAGNOSIS — M54.41 CHRONIC BILATERAL LOW BACK PAIN WITH BILATERAL SCIATICA: Primary | ICD-10-CM

## 2021-04-29 DIAGNOSIS — G89.29 CHRONIC BILATERAL LOW BACK PAIN WITH BILATERAL SCIATICA: Primary | ICD-10-CM

## 2021-04-29 DIAGNOSIS — M54.42 CHRONIC BILATERAL LOW BACK PAIN WITH BILATERAL SCIATICA: Primary | ICD-10-CM

## 2021-04-29 PROCEDURE — 97110 THERAPEUTIC EXERCISES: CPT | Performed by: PHYSICAL THERAPIST

## 2021-04-29 PROCEDURE — 97530 THERAPEUTIC ACTIVITIES: CPT | Performed by: PHYSICAL THERAPIST

## 2021-04-29 NOTE — PROGRESS NOTES
Physical Therapy Daily Progress Note    Patient Information  Melita Syed  1960      Visit # : 6    Melita Syed reports 6/10 pain today at rest.  Pt reports she is feeling much better today since she stopped taking her blood pressure meds.      Pt reports the MD visit showed that one of the rods may be bent.  She is getting a myelogram soon and then following up with MD.      Objective Pt presents to PT today with more energy and endurance today.  She is still having discomfort and distress with ambulation.      BP today prior to /75    Pt with improved exercises in Weight bearing position.     See Exercise, Manual, and Modality Logs for complete treatment.     Assessment/Plan  Pt is much improved with her activity today.  She has been able to do more today than she has in the past.       Progress per Plan of Care and Progress strengthening /stabilization /functional activity      Visit Diagnoses:    ICD-10-CM ICD-9-CM   1. Chronic bilateral low back pain with bilateral sciatica  M54.42 724.2    M54.41 724.3    G89.29 338.29            Manual Therapy:         mins  48145;  Therapeutic Exercise:    31     mins  16332;     Neuromuscular Love:        mins  00192;    Therapeutic Activity:     10     mins  53695;     Gait Training:           mins  28932;     Ultrasound:          mins  61536;    Electrical Stimulation:         mins  91768 ( );  Dry Needling          mins self-pay    Timed Treatment:   41   mins   Total Treatment:     41   mins          Perfecto Thomas, PT  Physical Therapist

## 2021-05-06 ENCOUNTER — TREATMENT (OUTPATIENT)
Dept: PHYSICAL THERAPY | Facility: CLINIC | Age: 61
End: 2021-05-06

## 2021-05-06 DIAGNOSIS — G89.29 CHRONIC BILATERAL LOW BACK PAIN WITH BILATERAL SCIATICA: Primary | ICD-10-CM

## 2021-05-06 DIAGNOSIS — M54.41 CHRONIC BILATERAL LOW BACK PAIN WITH BILATERAL SCIATICA: Primary | ICD-10-CM

## 2021-05-06 DIAGNOSIS — M54.42 CHRONIC BILATERAL LOW BACK PAIN WITH BILATERAL SCIATICA: Primary | ICD-10-CM

## 2021-05-06 PROCEDURE — 97110 THERAPEUTIC EXERCISES: CPT | Performed by: PHYSICAL THERAPIST

## 2021-05-06 PROCEDURE — 97530 THERAPEUTIC ACTIVITIES: CPT | Performed by: PHYSICAL THERAPIST

## 2021-05-06 NOTE — PROGRESS NOTES
Physical Therapy Daily Progress Note    Patient Information  Melita Syed  1960      Visit # : 7    Melita Syed reports 8/10 pain today at rest.  Pt reports she is still really painful and limited with activity.  She is waiting for more special testing to see if she needs further surgery.       Objective Pt presents to PT today with moderate distress with ambulation and entering PT area.     Pt with pain elevated during the treatment session she had to self limit her activity today due to pain.     See Exercise, Manual, and Modality Logs for complete treatment.     Assessment/Plan  Holding pt until she gets myelogram.  She is having pain that is limiting her and her PT sessions are limited due to pain.       Progress per Plan of Care  Pt will call after testing and MD visit.     Visit Diagnoses:    ICD-10-CM ICD-9-CM   1. Chronic bilateral low back pain with bilateral sciatica  M54.42 724.2    M54.41 724.3    G89.29 338.29            Manual Therapy:         mins  89654;  Therapeutic Exercise:    26     mins  57205;     Neuromuscular Love:        mins  01314;    Therapeutic Activity:     10     mins  95502;     Gait Training:           mins  17258;     Ultrasound:          mins  11615;    Electrical Stimulation:         mins  77788 ( );  Dry Needling          mins self-pay    Timed Treatment:   38   mins   Total Treatment:     38   mins          Perfecto Thomsa, PT  Physical Therapist

## 2021-08-10 ENCOUNTER — TRANSCRIBE ORDERS (OUTPATIENT)
Dept: ADMINISTRATIVE | Facility: HOSPITAL | Age: 61
End: 2021-08-10

## 2021-08-10 DIAGNOSIS — Z12.39 ENCOUNTER FOR OTHER SCREENING FOR MALIGNANT NEOPLASM OF BREAST: Primary | ICD-10-CM

## 2021-09-28 ENCOUNTER — HOSPITAL ENCOUNTER (OUTPATIENT)
Dept: MAMMOGRAPHY | Facility: HOSPITAL | Age: 61
Discharge: HOME | End: 2021-09-28
Admitting: FAMILY MEDICINE

## 2021-09-28 DIAGNOSIS — Z12.39 ENCOUNTER FOR OTHER SCREENING FOR MALIGNANT NEOPLASM OF BREAST: ICD-10-CM

## 2021-09-28 PROCEDURE — 77067 SCR MAMMO BI INCL CAD: CPT

## 2021-09-28 PROCEDURE — 77063 BREAST TOMOSYNTHESIS BI: CPT

## 2021-10-11 ENCOUNTER — TRANSCRIBE ORDERS (OUTPATIENT)
Dept: ADMINISTRATIVE | Facility: HOSPITAL | Age: 61
End: 2021-10-11

## 2021-10-11 DIAGNOSIS — R92.8 ABNORMAL MAMMOGRAM OF RIGHT BREAST: Primary | ICD-10-CM

## 2021-10-20 ENCOUNTER — HOSPITAL ENCOUNTER (OUTPATIENT)
Dept: MAMMOGRAPHY | Facility: HOSPITAL | Age: 61
Discharge: HOME | End: 2021-10-20

## 2021-10-20 ENCOUNTER — HOSPITAL ENCOUNTER (OUTPATIENT)
Dept: ULTRASOUND IMAGING | Facility: HOSPITAL | Age: 61
Discharge: HOME | End: 2021-10-20

## 2021-10-20 DIAGNOSIS — R92.8 ABNORMAL MAMMOGRAM OF RIGHT BREAST: ICD-10-CM

## 2021-10-20 PROCEDURE — G0279 TOMOSYNTHESIS, MAMMO: HCPCS

## 2021-10-20 PROCEDURE — 76642 ULTRASOUND BREAST LIMITED: CPT

## 2021-10-20 PROCEDURE — 77065 DX MAMMO INCL CAD UNI: CPT

## 2021-11-24 DIAGNOSIS — Z01.812 BLOOD TESTS PRIOR TO TREATMENT OR PROCEDURE: Primary | ICD-10-CM

## 2021-11-29 ENCOUNTER — CLINICAL SUPPORT NO REQUIREMENTS (OUTPATIENT)
Dept: PULMONOLOGY | Facility: CLINIC | Age: 61
End: 2021-11-29

## 2021-11-29 DIAGNOSIS — Z01.812 BLOOD TESTS PRIOR TO TREATMENT OR PROCEDURE: ICD-10-CM

## 2021-11-29 PROCEDURE — 99211 OFF/OP EST MAY X REQ PHY/QHP: CPT | Performed by: INTERNAL MEDICINE

## 2021-11-29 PROCEDURE — U0004 COV-19 TEST NON-CDC HGH THRU: HCPCS | Performed by: INTERNAL MEDICINE

## 2021-11-30 LAB — SARS-COV-2 RNA NOSE QL NAA+PROBE: NOT DETECTED

## 2021-12-01 ENCOUNTER — OFFICE VISIT (OUTPATIENT)
Dept: PULMONOLOGY | Facility: CLINIC | Age: 61
End: 2021-12-01

## 2021-12-01 VITALS
DIASTOLIC BLOOD PRESSURE: 80 MMHG | BODY MASS INDEX: 20.25 KG/M2 | HEIGHT: 66 IN | HEART RATE: 100 BPM | SYSTOLIC BLOOD PRESSURE: 100 MMHG | TEMPERATURE: 97.1 F | WEIGHT: 126 LBS | OXYGEN SATURATION: 99 %

## 2021-12-01 DIAGNOSIS — J44.9 CHRONIC OBSTRUCTIVE PULMONARY DISEASE, UNSPECIFIED COPD TYPE (HCC): Primary | ICD-10-CM

## 2021-12-01 DIAGNOSIS — J43.9 PULMONARY EMPHYSEMA, UNSPECIFIED EMPHYSEMA TYPE (HCC): ICD-10-CM

## 2021-12-01 DIAGNOSIS — Z72.0 TOBACCO ABUSE: ICD-10-CM

## 2021-12-01 PROCEDURE — 94726 PLETHYSMOGRAPHY LUNG VOLUMES: CPT | Performed by: INTERNAL MEDICINE

## 2021-12-01 PROCEDURE — 94729 DIFFUSING CAPACITY: CPT | Performed by: INTERNAL MEDICINE

## 2021-12-01 PROCEDURE — 94375 RESPIRATORY FLOW VOLUME LOOP: CPT | Performed by: INTERNAL MEDICINE

## 2021-12-01 PROCEDURE — 99205 OFFICE O/P NEW HI 60 MIN: CPT | Performed by: INTERNAL MEDICINE

## 2021-12-01 RX ORDER — METOPROLOL SUCCINATE 25 MG/1
25 TABLET, EXTENDED RELEASE ORAL DAILY
COMMUNITY
Start: 2021-10-22 | End: 2023-01-25 | Stop reason: SDUPTHER

## 2021-12-01 RX ORDER — CITALOPRAM 10 MG/1
20 TABLET ORAL DAILY
COMMUNITY
Start: 2021-07-13

## 2021-12-01 RX ORDER — ASPIRIN 81 MG/1
81 TABLET, CHEWABLE ORAL DAILY
COMMUNITY
Start: 2021-10-22 | End: 2022-10-22

## 2021-12-01 RX ORDER — BUDESONIDE AND FORMOTEROL FUMARATE DIHYDRATE 80; 4.5 UG/1; UG/1
AEROSOL RESPIRATORY (INHALATION)
COMMUNITY
Start: 2021-07-13 | End: 2021-12-01

## 2021-12-01 RX ORDER — PANTOPRAZOLE SODIUM 40 MG/1
40 TABLET, DELAYED RELEASE ORAL DAILY
COMMUNITY
Start: 2021-08-24

## 2021-12-01 RX ORDER — ONDANSETRON 4 MG/1
TABLET, ORALLY DISINTEGRATING ORAL
COMMUNITY
Start: 2021-10-26

## 2021-12-01 RX ORDER — NALOXEGOL OXALATE 25 MG/1
25 TABLET, FILM COATED ORAL
COMMUNITY
Start: 2021-08-24

## 2021-12-01 RX ORDER — NITROGLYCERIN 0.4 MG/1
0.4 TABLET SUBLINGUAL
COMMUNITY
Start: 2021-10-22 | End: 2022-10-22

## 2021-12-01 RX ORDER — BUDESONIDE, GLYCOPYRROLATE, AND FORMOTEROL FUMARATE 160; 9; 4.8 UG/1; UG/1; UG/1
2 AEROSOL, METERED RESPIRATORY (INHALATION) 2 TIMES DAILY
Qty: 1 EACH | Refills: 6 | Status: SHIPPED | OUTPATIENT
Start: 2021-12-01 | End: 2022-03-01

## 2021-12-01 RX ORDER — NITROGLYCERIN 0.4 MG/1
TABLET SUBLINGUAL
COMMUNITY
Start: 2021-10-22 | End: 2022-08-30 | Stop reason: SDUPTHER

## 2021-12-01 RX ORDER — ATORVASTATIN CALCIUM 40 MG/1
40 TABLET, FILM COATED ORAL DAILY
COMMUNITY
Start: 2021-10-22 | End: 2022-10-22

## 2021-12-01 NOTE — PROGRESS NOTES
Initial Pulmonary Consult Note    Subjective   Reason for consultation/Chief Complaint: Shortness of Breath    Melita Syed is a 61 y.o. female is being seen for consultation today at the request of Paola Goins MD    History of Present Illness    Ms. Syed is a 62yo F who is referred for shortness of breath. She has been diagnosed with COPD in the past and is currently on Symbicort 80/4.5 and Anoro. She notes that she still has recurrent at least yearly bronchitis despite inhaler therapy. She is a former smoker. She smoked 1/2-1 pack per day and quit over 2 years ago. She notes that her mother and grandmother both had COPD.     Active Ambulatory Problems     Diagnosis Date Noted   • Hypokalemia 07/14/2016   • Pulmonary emphysema (HCC) 07/14/2016   • ICD (implantable cardioverter-defibrillator) malfunction 07/14/2016   • Tobacco abuse 07/14/2016   • Essential hypertension 07/14/2016   • Cardiomyopathy (HCC) 08/04/2016   • Annual GYN exam w/o problems 09/11/2016   • COPD, moderate ([facility])    • Depression    • Myocardial infarction (HCC) 01/01/2006   • Long Q-T syndrome    • Family history of colon cancer 09/12/2016   • Hematuria, microscopic 09/12/2016   • Left lower quadrant pain 09/12/2016   • Slow transit constipation 09/12/2016   • Tubular adenoma of colon 10/07/2016   • Encounter for screening colonoscopy 08/07/2019   • Screen for colon cancer 10/03/2019   • Vaginal atrophy 11/11/2019     Resolved Ambulatory Problems     Diagnosis Date Noted   • No Resolved Ambulatory Problems     Past Medical History:   Diagnosis Date   • Anxiety and depression    • Arthritis    • ASCUS with positive high risk human papillomavirus of vagina 06/22/2017   • Back pain    • COPD (chronic obstructive pulmonary disease) (HCC)    • Diverticulitis    • Dizziness    • Eczema    • Endometriosis    • GERD (gastroesophageal reflux disease)    • Headache    • IBS (irritable bowel syndrome)    • ICD (implantable  cardioverter-defibrillator) in place    • Insomnia    • Scoliosis    • Seizure (HCC)    • Tinnitus    • Wears dentures    • Wears glasses        Past Surgical History:   Procedure Laterality Date   • BLADDER SUSPENSION  2000 [place] - done via laparotomy   • CARDIAC DEFIBRILLATOR PLACEMENT     • CARDIAC ELECTROPHYSIOLOGY PROCEDURE N/A 8/4/2016    Procedure: replace old icd, likely extract both old leads with OR back-up;  Surgeon: Robert Queen MD;  Location:  [facility];  Service:    • COLONOSCOPY      2010   • COLONOSCOPY N/A 9/13/2019    Procedure: COLONOSCOPY;  Surgeon: Marv Ramos MD;  Location:  [facility];  Service: Gastroenterology   • COLONOSCOPY N/A 12/13/2019    Procedure: COLONOSCOPY;  Surgeon: Marv Ramos MD;  Location:  [facility];  Service: Gastroenterology   • ENDOSCOPY     • HYSTERECTOMY  1989    Via exploratory laparotomy (transverse incision) along with incidental appendectomy.  Done for endometriosis   • LUMBAR FUSION  2015   • MENISCECTOMY Right 1993   • OOPHORECTOMY Right 1986    Done via vertical midline incision - stil have left ovary    • TONSILLECTOMY AND ADENOIDECTOMY  1973       Family History   Problem Relation Age of Onset   • Arthritis Mother    • Hypertension Mother    • Thyroid disease Mother    • Hypertension Father    • Colon cancer Sister 50   • Hypertension Brother    • Breast cancer Neg Hx    • Ovarian cancer Neg Hx        Social History     Socioeconomic History   • Marital status:    Tobacco Use   • Smoking status: Current Every Day Smoker     Packs/day: 0.50     Years: 40.00     Pack years: 20.00     Types: Cigarettes, Electronic Cigarette     Start date: 1975   • Smokeless tobacco: Never Used   • Tobacco comment: now smoking 1/2 pack per day- trying to quit now    Vaping Use   • Vaping Use: Never used   Substance and Sexual Activity   • Alcohol use: No   • Drug use: Yes     Types: Marijuana     Comment: LAST USED  "WEEK OF 9-2-2019   • Sexual activity: Defer     Birth control/protection: Surgical     Comment: Hysterectomy       Allergies   Allergen Reactions   • Levaquin [Levofloxacin] Hives   • Phenergan [Promethazine Hcl] Other (See Comments)     seizure   • Promethazine Other (See Comments) and Unknown - High Severity     seizure   • Vancomycin Other (See Comments)     \"ed\" syndrome   • Doxycycline GI Intolerance   • Morphine And Related Nausea And Vomiting   • Adhesive Tape Rash   • Dicyclomine Other (See Comments)   • Flonase [Fluticasone] Other (See Comments)     \"dries my nose out really bad\"   • Sulfa Antibiotics Rash   • Wound Dressing Adhesive Unknown - High Severity       Current Outpatient Medications   Medication Sig Dispense Refill   • albuterol (VENTOLIN HFA) 108 (90 BASE) MCG/ACT inhaler Inhale 2 puffs Every 4 (Four) Hours As Needed for Wheezing. 1 inhaler 2   • aspirin 81 MG chewable tablet Chew 81 mg Daily.     • atorvastatin (LIPITOR) 40 MG tablet Take 40 mg by mouth Daily.     • citalopram (CeleXA) 10 MG tablet Take 10 mg by mouth Daily.     • metoprolol succinate XL (TOPROL-XL) 25 MG 24 hr tablet Take 25 mg by mouth Daily.     • Naloxegol Oxalate (Movantik) 25 MG tablet Take 25 mg by mouth.     • nitroglycerin (NITROSTAT) 0.4 MG SL tablet Place 0.4 mg under the tongue.     • nitroglycerin (NITROSTAT) 0.4 MG SL tablet      • ondansetron ODT (ZOFRAN-ODT) 4 MG disintegrating tablet      • pantoprazole (PROTONIX) 40 MG EC tablet Take 40 mg by mouth Daily.     • potassium chloride (K-DUR,KLOR-CON) 20 MEQ CR tablet Take 1 tablet by mouth Daily. 90 tablet 0   • tiZANidine (ZANAFLEX) 4 MG tablet Take 4 mg by mouth Every 6 (Six) Hours As Needed for Muscle Spasms.     • traZODone (DESYREL) 150 MG tablet      • Budeson-Glycopyrrol-Formoterol (Breztri Aerosphere) 160-9-4.8 MCG/ACT aerosol inhaler Inhale 2 puffs 2 (Two) Times a Day. 1 each 6     No current facility-administered medications for this visit. " "      Review of Systems   Constitutional: Negative.    HENT: Negative.    Eyes: Negative.    Respiratory: Positive for cough, shortness of breath and wheezing.    Cardiovascular: Positive for chest pain.   Gastrointestinal: Negative.    Endocrine: Negative.    Genitourinary: Negative.    Musculoskeletal: Negative.    Skin: Negative.    Allergic/Immunologic: Negative.    Neurological: Negative.    Hematological: Negative.    Psychiatric/Behavioral: Negative.          Objective   Blood pressure 100/80, pulse 100, temperature 97.1 °F (36.2 °C), height 167.6 cm (65.98\"), weight 57.2 kg (126 lb), last menstrual period 09/12/1989, SpO2 99 %, not currently breastfeeding.  Physical Exam  Vitals and nursing note reviewed.   Constitutional:       General: She is not in acute distress.     Appearance: She is well-developed.   HENT:      Head: Normocephalic and atraumatic.   Eyes:      General: No scleral icterus.     Conjunctiva/sclera: Conjunctivae normal.      Pupils: Pupils are equal, round, and reactive to light.   Neck:      Thyroid: No thyromegaly.      Trachea: No tracheal deviation.   Cardiovascular:      Rate and Rhythm: Normal rate and regular rhythm.      Heart sounds: Normal heart sounds.   Pulmonary:      Effort: Pulmonary effort is normal. No respiratory distress.      Breath sounds: Decreased breath sounds present. No wheezing.   Abdominal:      General: Bowel sounds are normal.      Palpations: Abdomen is soft.      Tenderness: There is no abdominal tenderness.   Musculoskeletal:         General: Normal range of motion.      Cervical back: Normal range of motion and neck supple.   Lymphadenopathy:      Cervical: No cervical adenopathy.   Skin:     General: Skin is warm and dry.      Findings: No erythema or rash.   Neurological:      Mental Status: She is alert and oriented to person, place, and time.      Motor: No abnormal muscle tone.      Coordination: Coordination normal.   Psychiatric:         Speech: " Speech normal.         Behavior: Behavior normal.         Judgment: Judgment normal.         PFTs:  Performed in clinic and personally reviewed.   There is no airway obstruction.  There is no restriction. There is air trapping present.   The DLCO is normal.    Imaging:  Chest xray performed in clinic today and personally reviewed. This is a PA/Lateral film. There is an ICD in place with the leads in appropriate position. There is hyperinflation noted consistent with emphysema. There are coarse interstitial markings bilaterally. The diaphragms are flattened. There is no pneumothorax or pleural effusion.     Impression: No acute process. Changes consistent with emphysema.     Assessment/Plan   Diagnoses and all orders for this visit:    1. Chronic obstructive pulmonary disease, unspecified COPD type (HCC) (Primary)  -     XR Chest PA & Lateral  -     Pulmonary Function Test    2. Pulmonary emphysema, unspecified emphysema type (HCC)    3. Tobacco abuse    Other orders  -     Budeson-Glycopyrrol-Formoterol (Breztri Aerosphere) 160-9-4.8 MCG/ACT aerosol inhaler; Inhale 2 puffs 2 (Two) Times a Day.  Dispense: 1 each; Refill: 6        Discussion:  Ms. Syed is a 60yo F who is referred for shortness of breath.     1. Emphysema  - She does not technically have airway obstruction on her PFTs. However, the presence of air trapping and changes on CXR are consistent with emphysema.   - We will change her Symbicort and Anoro to Breztri. She was given samples in clinic with a spacer and I have sent an Rx. She was instructed on proper use prior to leaving the clinic.   - We will also arrange for her to have a home nebulizer machine with Duonebs to use PRN.   - We will check an alpha-1 kit today in clinic.     2. Former Smoker  - She will qualify for low-dose CT screening for lung cancer.   - We discussed lung cancer screening and she is agreeable.     Follow up in 3 months. We will call her with the results of her CT scan of the  chest.       I have spent 62 minutes reviewing the patient record, face to face with the patient in discussion of test results, current status and plans for further management.       Marybel V Case, DO  Pulmonary and Critical Care Medicine  Note Electronically Signed

## 2021-12-02 DIAGNOSIS — F17.210 CIGARETTE NICOTINE DEPENDENCE WITHOUT COMPLICATION: Primary | ICD-10-CM

## 2021-12-10 ENCOUNTER — HOSPITAL ENCOUNTER (OUTPATIENT)
Dept: CT IMAGING | Facility: HOSPITAL | Age: 61
Discharge: HOME | End: 2021-12-10
Admitting: NURSE PRACTITIONER

## 2021-12-10 DIAGNOSIS — F17.210 CIGARETTE NICOTINE DEPENDENCE WITHOUT COMPLICATION: ICD-10-CM

## 2021-12-10 PROCEDURE — 71271 CT THORAX LUNG CANCER SCR C-: CPT

## 2022-03-01 ENCOUNTER — OFFICE VISIT (OUTPATIENT)
Dept: PULMONOLOGY | Facility: CLINIC | Age: 62
End: 2022-03-01

## 2022-03-01 VITALS
OXYGEN SATURATION: 99 % | HEIGHT: 66 IN | DIASTOLIC BLOOD PRESSURE: 80 MMHG | SYSTOLIC BLOOD PRESSURE: 120 MMHG | WEIGHT: 131.6 LBS | TEMPERATURE: 97.7 F | HEART RATE: 85 BPM | BODY MASS INDEX: 21.15 KG/M2

## 2022-03-01 DIAGNOSIS — Z72.0 TOBACCO ABUSE: ICD-10-CM

## 2022-03-01 DIAGNOSIS — J43.9 PULMONARY EMPHYSEMA, UNSPECIFIED EMPHYSEMA TYPE: Primary | ICD-10-CM

## 2022-03-01 DIAGNOSIS — J30.0 VASOMOTOR RHINITIS: ICD-10-CM

## 2022-03-01 PROCEDURE — 99214 OFFICE O/P EST MOD 30 MIN: CPT | Performed by: INTERNAL MEDICINE

## 2022-03-01 RX ORDER — IPRATROPIUM BROMIDE AND ALBUTEROL SULFATE 2.5; .5 MG/3ML; MG/3ML
2.5 SOLUTION RESPIRATORY (INHALATION) 2 TIMES DAILY
COMMUNITY
Start: 2022-02-24

## 2022-03-01 RX ORDER — BUDESONIDE AND FORMOTEROL FUMARATE DIHYDRATE 160; 4.5 UG/1; UG/1
2 AEROSOL RESPIRATORY (INHALATION)
Qty: 1 EACH | Refills: 6 | Status: SHIPPED | OUTPATIENT
Start: 2022-03-01 | End: 2022-08-30

## 2022-03-01 RX ORDER — POTASSIUM CHLORIDE 1500 MG/1
20 TABLET, FILM COATED, EXTENDED RELEASE ORAL DAILY
COMMUNITY
Start: 2022-01-06 | End: 2022-08-30 | Stop reason: SDUPTHER

## 2022-03-01 RX ORDER — DILTIAZEM HYDROCHLORIDE 60 MG/1
4.5 TABLET, FILM COATED ORAL 2 TIMES DAILY
COMMUNITY
Start: 2021-12-13 | End: 2022-03-01

## 2022-03-01 RX ORDER — IPRATROPIUM BROMIDE 42 UG/1
2 SPRAY, METERED NASAL 4 TIMES DAILY
Qty: 15 ML | Refills: 12 | Status: SHIPPED | OUTPATIENT
Start: 2022-03-01 | End: 2023-03-27

## 2022-03-01 NOTE — PROGRESS NOTES
"Pulmonary Office Follow Up      Subjective   Chief Complaint: Shortness of Breath    Melita Syed is a 61 y.o. female is being seen in follow up for Emphysema    History of Present Illness    Ms. Syed is a 62yo F who is followed for emphysema. She was last seen in clinic on 12/1/21.     She returns to clinic today for follow up. She was unable to get Breztri filled as it was too expensive. She is on Symbicort 80/4.5 and Duonebs. She has a lot of sinus drainage.     The following portions of the patient's history were reviewed and updated as appropriate: allergies, current medications, past family history, past medical history, past social history, past surgical history and problem list.    Review of Systems   Constitutional: Negative.    HENT: Positive for postnasal drip and rhinorrhea.    Eyes: Negative.    Respiratory: Positive for cough and shortness of breath.    Cardiovascular: Negative.    Gastrointestinal: Negative.    Endocrine: Negative.    Genitourinary: Negative.    Musculoskeletal: Positive for back pain.   Skin: Negative.    Allergic/Immunologic: Negative.    Neurological: Negative.    Hematological: Negative.    Psychiatric/Behavioral: Negative.           Objective   Blood pressure 120/80, pulse 85, temperature 97.7 °F (36.5 °C), height 167.6 cm (66\"), weight 59.7 kg (131 lb 9.6 oz), last menstrual period 09/12/1989, SpO2 99 %, not currently breastfeeding.  Physical Exam  Vitals and nursing note reviewed.   Constitutional:       General: She is not in acute distress.     Appearance: She is well-developed.   HENT:      Head: Normocephalic and atraumatic.   Eyes:      General: No scleral icterus.     Conjunctiva/sclera: Conjunctivae normal.      Pupils: Pupils are equal, round, and reactive to light.   Neck:      Thyroid: No thyromegaly.      Trachea: No tracheal deviation.   Cardiovascular:      Rate and Rhythm: Normal rate and regular rhythm.      Heart sounds: Normal heart sounds. "   Pulmonary:      Effort: Pulmonary effort is normal. No respiratory distress.      Breath sounds: Decreased breath sounds present. No wheezing.   Abdominal:      General: Bowel sounds are normal.      Palpations: Abdomen is soft.      Tenderness: There is no abdominal tenderness.   Musculoskeletal:         General: Normal range of motion.      Cervical back: Normal range of motion and neck supple.   Lymphadenopathy:      Cervical: No cervical adenopathy.   Skin:     General: Skin is warm and dry.      Findings: No erythema or rash.   Neurological:      Mental Status: She is alert and oriented to person, place, and time.      Motor: No abnormal muscle tone.      Coordination: Coordination normal.   Psychiatric:         Speech: Speech normal.         Behavior: Behavior normal.         Judgment: Judgment normal.         PFTs:  No new PFTs.    Imaging:  Low Dose CT chest from 12/10/21 reviewed. There is a solitary 4mm left upper lobe nodule.     Assessment/Plan   Diagnoses and all orders for this visit:    1. Pulmonary emphysema, unspecified emphysema type (HCC) (Primary)    2. Tobacco abuse    3. Vasomotor rhinitis    Other orders  -     budesonide-formoterol (SYMBICORT) 160-4.5 MCG/ACT inhaler; Inhale 2 puffs 2 (Two) Times a Day.  Dispense: 1 each; Refill: 6  -     ipratropium (ATROVENT) 0.06 % nasal spray; 2 sprays into the nostril(s) as directed by provider 4 (Four) Times a Day.  Dispense: 15 mL; Refill: 12        Discussion:  Ms. Syed is a 62yo F who is followed for shortness of breath.      1. Emphysema  - She does not technically have airway obstruction on her PFTs. However, the presence of air trapping and changes on CXR are consistent with emphysema.   - Increase Symbicort to 160/4.5. New Rx sent.   - Continue Duonebs PRN.    - Alpha 1 was MM.      2. Former Smoker  - Low dose CT December 2021 with a 4mm left upper lobe nodule.   - Continue yearly low-dose screening CT scans.     3. Rhinitis  - I have sent a  Rx for Atrovent nasal.        Follow up in 4 months.     Marybel V Case, DO  Pulmonary and Critical Care Medicine  Note Electronically Signed

## 2022-04-13 ENCOUNTER — TRANSCRIBE ORDERS (OUTPATIENT)
Dept: ADMINISTRATIVE | Facility: HOSPITAL | Age: 62
End: 2022-04-13

## 2022-04-13 DIAGNOSIS — R22.1 LUMP IN NECK: ICD-10-CM

## 2022-04-13 DIAGNOSIS — R92.8 ABNORMAL MAMMOGRAM: Primary | ICD-10-CM

## 2022-05-13 ENCOUNTER — HOSPITAL ENCOUNTER (OUTPATIENT)
Dept: MAMMOGRAPHY | Facility: HOSPITAL | Age: 62
Discharge: HOME | End: 2022-05-13

## 2022-05-13 ENCOUNTER — HOSPITAL ENCOUNTER (OUTPATIENT)
Dept: ULTRASOUND IMAGING | Facility: HOSPITAL | Age: 62
Discharge: HOME | End: 2022-05-13

## 2022-05-13 DIAGNOSIS — R22.1 LUMP IN NECK: ICD-10-CM

## 2022-05-13 DIAGNOSIS — R92.8 ABNORMAL MAMMOGRAM: ICD-10-CM

## 2022-05-13 PROCEDURE — G0279 TOMOSYNTHESIS, MAMMO: HCPCS

## 2022-05-13 PROCEDURE — 76536 US EXAM OF HEAD AND NECK: CPT

## 2022-05-13 PROCEDURE — 77065 DX MAMMO INCL CAD UNI: CPT

## 2022-08-30 ENCOUNTER — OFFICE VISIT (OUTPATIENT)
Dept: PULMONOLOGY | Facility: CLINIC | Age: 62
End: 2022-08-30

## 2022-08-30 VITALS
SYSTOLIC BLOOD PRESSURE: 100 MMHG | TEMPERATURE: 98 F | HEART RATE: 100 BPM | OXYGEN SATURATION: 95 % | BODY MASS INDEX: 21.6 KG/M2 | HEIGHT: 66 IN | WEIGHT: 134.4 LBS | DIASTOLIC BLOOD PRESSURE: 70 MMHG

## 2022-08-30 DIAGNOSIS — I25.118 CORONARY ARTERY DISEASE OF NATIVE ARTERY OF NATIVE HEART WITH STABLE ANGINA PECTORIS: ICD-10-CM

## 2022-08-30 DIAGNOSIS — Z12.2 ENCOUNTER FOR SCREENING FOR LUNG CANCER: ICD-10-CM

## 2022-08-30 DIAGNOSIS — G47.00 INSOMNIA, UNSPECIFIED TYPE: ICD-10-CM

## 2022-08-30 DIAGNOSIS — Z87.891 HISTORY OF NICOTINE DEPENDENCE: ICD-10-CM

## 2022-08-30 DIAGNOSIS — J43.9 PULMONARY EMPHYSEMA, UNSPECIFIED EMPHYSEMA TYPE: Primary | ICD-10-CM

## 2022-08-30 DIAGNOSIS — J30.0 VASOMOTOR RHINITIS: ICD-10-CM

## 2022-08-30 PROCEDURE — 99214 OFFICE O/P EST MOD 30 MIN: CPT | Performed by: INTERNAL MEDICINE

## 2022-08-30 RX ORDER — BUDESONIDE 0.5 MG/2ML
0.5 INHALANT ORAL
Qty: 60 ML | Refills: 11 | Status: SHIPPED | OUTPATIENT
Start: 2022-08-30

## 2022-08-30 NOTE — PROGRESS NOTES
"Pulmonary Office Follow Up      Subjective   Chief Complaint: Shortness of Breath    Melita Sepulveda is a 62 y.o. female is being seen in follow up for Emphysema    History of Present Illness     is a 63yo F who is followed for emphysema. She was last seen in clinic on 3/1/22.     She returns to clinic today for follow up.  She notes that the Symbicort does not seem to be helping much. She notes chest pain and shortness of breath which are worse with exertion. Her chest pain improves with rest. She is using her Albuterol frequently and has taken Nitroglycerin. She thinks that her nebulizers are helping more than the Symbicort.     She also reports trouble with insomnia. She is taking Trazadone but cannot stay asleep. She does not have any snoring or apneas.     The following portions of the patient's history were reviewed and updated as appropriate: allergies, current medications, past family history, past medical history, past social history, past surgical history and problem list.    Review of Systems   Constitutional: Negative.    HENT: Positive for postnasal drip and rhinorrhea.    Eyes: Negative.    Respiratory: Positive for cough and shortness of breath.    Cardiovascular: Positive for chest pain.   Gastrointestinal: Negative.    Endocrine: Negative.    Genitourinary: Negative.    Musculoskeletal: Positive for back pain.   Skin: Negative.    Allergic/Immunologic: Negative.    Neurological: Negative.    Hematological: Negative.    Psychiatric/Behavioral: Negative.           Objective   Blood pressure 100/70, pulse 100, temperature 98 °F (36.7 °C), temperature source Infrared, height 167.6 cm (66\"), weight 61 kg (134 lb 6.4 oz), last menstrual period 09/12/1989, SpO2 95 %, not currently breastfeeding.  Physical Exam  Vitals and nursing note reviewed.   Constitutional:       General: She is not in acute distress.     Appearance: She is well-developed.   HENT:      Head: Normocephalic and " atraumatic.   Eyes:      General: No scleral icterus.     Conjunctiva/sclera: Conjunctivae normal.      Pupils: Pupils are equal, round, and reactive to light.   Neck:      Thyroid: No thyromegaly.      Trachea: No tracheal deviation.   Cardiovascular:      Rate and Rhythm: Normal rate and regular rhythm.      Heart sounds: Normal heart sounds.   Pulmonary:      Effort: Pulmonary effort is normal. No respiratory distress.      Breath sounds: Decreased breath sounds present. No wheezing.   Abdominal:      General: Bowel sounds are normal.      Palpations: Abdomen is soft.      Tenderness: There is no abdominal tenderness.   Musculoskeletal:         General: Normal range of motion.      Cervical back: Normal range of motion and neck supple.   Lymphadenopathy:      Cervical: No cervical adenopathy.   Skin:     General: Skin is warm and dry.      Findings: No erythema or rash.   Neurological:      Mental Status: She is alert and oriented to person, place, and time.      Motor: No abnormal muscle tone.      Coordination: Coordination normal.   Psychiatric:         Speech: Speech normal.         Behavior: Behavior normal.         Judgment: Judgment normal.         PFTs:  No new PFTs.    Imaging:  No new imaging.     Assessment & Plan   Diagnoses and all orders for this visit:    1. Pulmonary emphysema, unspecified emphysema type (HCC) (Primary)    2. Vasomotor rhinitis    3. Coronary artery disease of native artery of native heart with stable angina pectoris (HCC)    4. Insomnia, unspecified type  -     Ambulatory Referral to Sleep Medicine    5. Encounter for screening for lung cancer    6. History of nicotine dependence  -     CT chest low dose wo; Future    Other orders  -     budesonide (Pulmicort) 0.5 MG/2ML nebulizer solution; Take 2 mL by nebulization Daily.  Dispense: 60 mL; Refill: 11        Discussion:  Ms. Sepulveda is a 61yo F who is followed for shortness of breath.      1. Emphysema  - She does not  technically have airway obstruction on her PFTs. However, the presence of air trapping and changes on CXR are consistent with emphysema.   - Stop Symbicort and start Pulmicort nebulizers.    - Continue Duonebs PRN.    - Alpha 1 was MM.      2. Former Smoker  - Low dose CT December 2021 with a 4mm left upper lobe nodule.   - Continue yearly low-dose screening CT scans. She will be due again in December. We will order this today.     3. Rhinitis  - Continue Atrovent nasal.     4. Chest Pain  - She will see her Cardiologist next month. I have asked her to discuss her symptoms with them as it has been many years since she last had a stress test and she does have a history of CAD.   - I have counseled her that if she develops worsening chest pain or it does not alice, she needs to present to the ED.     5. Insomnia  - I will refer her to Sleep Medicine for further management  - She does not have risk factors or a history to suggest possible sleep apnea.     Follow up in December after low-dose CT chest.        Follow up in 4 months.     Marybel FRANCISCO Case, DO  Pulmonary and Critical Care Medicine  Note Electronically Signed

## 2022-10-19 ENCOUNTER — HOSPITAL ENCOUNTER (EMERGENCY)
Facility: HOSPITAL | Age: 62
Discharge: HOME | End: 2022-10-20
Attending: FAMILY MEDICINE | Admitting: FAMILY MEDICINE

## 2022-10-19 DIAGNOSIS — R10.30 LOWER ABDOMINAL PAIN: ICD-10-CM

## 2022-10-19 DIAGNOSIS — R30.0 DYSURIA: Primary | ICD-10-CM

## 2022-10-19 LAB
ALBUMIN SERPL-MCNC: 4 G/DL (ref 3.5–5.2)
ALBUMIN/GLOB SERPL: 1.7 G/DL
ALP SERPL-CCNC: 89 U/L (ref 39–117)
ALT SERPL W P-5'-P-CCNC: 5 U/L (ref 1–33)
ANION GAP SERPL CALCULATED.3IONS-SCNC: 9.1 MMOL/L (ref 5–15)
AST SERPL-CCNC: 13 U/L (ref 1–32)
BASOPHILS # BLD AUTO: 0.07 10*3/MM3 (ref 0–0.2)
BASOPHILS NFR BLD AUTO: 0.9 % (ref 0–1.5)
BILIRUB SERPL-MCNC: 0.3 MG/DL (ref 0–1.2)
BILIRUB UR QL STRIP: NEGATIVE
BUN SERPL-MCNC: 17 MG/DL (ref 8–23)
BUN/CREAT SERPL: 19.8 (ref 7–25)
CALCIUM SPEC-SCNC: 8.8 MG/DL (ref 8.6–10.5)
CHLORIDE SERPL-SCNC: 101 MMOL/L (ref 98–107)
CLARITY UR: ABNORMAL
CO2 SERPL-SCNC: 25.9 MMOL/L (ref 22–29)
COLOR UR: YELLOW
CREAT SERPL-MCNC: 0.86 MG/DL (ref 0.57–1)
DEPRECATED RDW RBC AUTO: 43.6 FL (ref 37–54)
EGFRCR SERPLBLD CKD-EPI 2021: 76.5 ML/MIN/1.73
EOSINOPHIL # BLD AUTO: 0.1 10*3/MM3 (ref 0–0.4)
EOSINOPHIL NFR BLD AUTO: 1.3 % (ref 0.3–6.2)
ERYTHROCYTE [DISTWIDTH] IN BLOOD BY AUTOMATED COUNT: 12.1 % (ref 12.3–15.4)
GLOBULIN UR ELPH-MCNC: 2.4 GM/DL
GLUCOSE SERPL-MCNC: 109 MG/DL (ref 65–99)
GLUCOSE UR STRIP-MCNC: NEGATIVE MG/DL
HCT VFR BLD AUTO: 35.2 % (ref 34–46.6)
HGB BLD-MCNC: 12.4 G/DL (ref 12–15.9)
HGB UR QL STRIP.AUTO: NEGATIVE
IMM GRANULOCYTES # BLD AUTO: 0.01 10*3/MM3 (ref 0–0.05)
IMM GRANULOCYTES NFR BLD AUTO: 0.1 % (ref 0–0.5)
KETONES UR QL STRIP: NEGATIVE
LEUKOCYTE ESTERASE UR QL STRIP.AUTO: NEGATIVE
LIPASE SERPL-CCNC: 86 U/L (ref 13–60)
LYMPHOCYTES # BLD AUTO: 3.54 10*3/MM3 (ref 0.7–3.1)
LYMPHOCYTES NFR BLD AUTO: 44.7 % (ref 19.6–45.3)
MCH RBC QN AUTO: 34.3 PG (ref 26.6–33)
MCHC RBC AUTO-ENTMCNC: 35.2 G/DL (ref 31.5–35.7)
MCV RBC AUTO: 97.2 FL (ref 79–97)
MONOCYTES # BLD AUTO: 0.46 10*3/MM3 (ref 0.1–0.9)
MONOCYTES NFR BLD AUTO: 5.8 % (ref 5–12)
NEUTROPHILS NFR BLD AUTO: 3.74 10*3/MM3 (ref 1.7–7)
NEUTROPHILS NFR BLD AUTO: 47.2 % (ref 42.7–76)
NITRITE UR QL STRIP: NEGATIVE
NRBC BLD AUTO-RTO: 0 /100 WBC (ref 0–0.2)
PH UR STRIP.AUTO: <=5 [PH] (ref 5–8)
PLATELET # BLD AUTO: 228 10*3/MM3 (ref 140–450)
PMV BLD AUTO: 10.2 FL (ref 6–12)
POTASSIUM SERPL-SCNC: 3 MMOL/L (ref 3.5–5.2)
PROT SERPL-MCNC: 6.4 G/DL (ref 6–8.5)
PROT UR QL STRIP: NEGATIVE
RBC # BLD AUTO: 3.62 10*6/MM3 (ref 3.77–5.28)
SODIUM SERPL-SCNC: 136 MMOL/L (ref 136–145)
SP GR UR STRIP: 1.02 (ref 1–1.03)
TROPONIN T SERPL-MCNC: <0.01 NG/ML (ref 0–0.03)
UROBILINOGEN UR QL STRIP: ABNORMAL
WBC NRBC COR # BLD: 7.92 10*3/MM3 (ref 3.4–10.8)

## 2022-10-19 PROCEDURE — 99284 EMERGENCY DEPT VISIT MOD MDM: CPT

## 2022-10-19 PROCEDURE — 83735 ASSAY OF MAGNESIUM: CPT | Performed by: FAMILY MEDICINE

## 2022-10-19 PROCEDURE — 81003 URINALYSIS AUTO W/O SCOPE: CPT | Performed by: FAMILY MEDICINE

## 2022-10-19 PROCEDURE — 85025 COMPLETE CBC W/AUTO DIFF WBC: CPT | Performed by: FAMILY MEDICINE

## 2022-10-19 PROCEDURE — 80053 COMPREHEN METABOLIC PANEL: CPT | Performed by: FAMILY MEDICINE

## 2022-10-19 PROCEDURE — 84484 ASSAY OF TROPONIN QUANT: CPT | Performed by: FAMILY MEDICINE

## 2022-10-19 PROCEDURE — 83690 ASSAY OF LIPASE: CPT | Performed by: FAMILY MEDICINE

## 2022-10-19 RX ORDER — SODIUM CHLORIDE 0.9 % (FLUSH) 0.9 %
10 SYRINGE (ML) INJECTION AS NEEDED
Status: DISCONTINUED | OUTPATIENT
Start: 2022-10-19 | End: 2022-10-20 | Stop reason: HOSPADM

## 2022-10-19 RX ORDER — KETOROLAC TROMETHAMINE 30 MG/ML
15 INJECTION, SOLUTION INTRAMUSCULAR; INTRAVENOUS ONCE
Status: COMPLETED | OUTPATIENT
Start: 2022-10-19 | End: 2022-10-20

## 2022-10-19 RX ADMIN — SODIUM CHLORIDE 1000 ML: 9 INJECTION, SOLUTION INTRAVENOUS at 22:44

## 2022-10-20 ENCOUNTER — APPOINTMENT (OUTPATIENT)
Dept: CT IMAGING | Facility: HOSPITAL | Age: 62
End: 2022-10-20

## 2022-10-20 VITALS
WEIGHT: 128 LBS | DIASTOLIC BLOOD PRESSURE: 97 MMHG | HEIGHT: 65 IN | BODY MASS INDEX: 21.33 KG/M2 | OXYGEN SATURATION: 98 % | RESPIRATION RATE: 20 BRPM | SYSTOLIC BLOOD PRESSURE: 124 MMHG | HEART RATE: 82 BPM | TEMPERATURE: 98 F

## 2022-10-20 LAB — MAGNESIUM SERPL-MCNC: 2 MG/DL (ref 1.6–2.4)

## 2022-10-20 PROCEDURE — 96374 THER/PROPH/DIAG INJ IV PUSH: CPT

## 2022-10-20 PROCEDURE — 25010000002 IOPAMIDOL 61 % SOLUTION: Performed by: FAMILY MEDICINE

## 2022-10-20 PROCEDURE — 25010000002 KETOROLAC TROMETHAMINE PER 15 MG: Performed by: FAMILY MEDICINE

## 2022-10-20 PROCEDURE — 74177 CT ABD & PELVIS W/CONTRAST: CPT

## 2022-10-20 RX ORDER — PHENAZOPYRIDINE HYDROCHLORIDE 100 MG/1
200 TABLET, FILM COATED ORAL ONCE
Status: COMPLETED | OUTPATIENT
Start: 2022-10-20 | End: 2022-10-20

## 2022-10-20 RX ORDER — PHENAZOPYRIDINE HYDROCHLORIDE 200 MG/1
200 TABLET, FILM COATED ORAL 3 TIMES DAILY PRN
Qty: 6 TABLET | Refills: 0 | Status: SHIPPED | OUTPATIENT
Start: 2022-10-20

## 2022-10-20 RX ORDER — POTASSIUM CHLORIDE 20 MEQ/1
40 TABLET, EXTENDED RELEASE ORAL DAILY
Status: DISCONTINUED | OUTPATIENT
Start: 2022-10-20 | End: 2022-10-20 | Stop reason: HOSPADM

## 2022-10-20 RX ADMIN — IOPAMIDOL 100 ML: 612 INJECTION, SOLUTION INTRAVENOUS at 00:40

## 2022-10-20 RX ADMIN — PHENAZOPYRIDINE HYDROCHLORIDE 200 MG: 100 TABLET, FILM COATED ORAL at 02:06

## 2022-10-20 RX ADMIN — KETOROLAC TROMETHAMINE 15 MG: 30 INJECTION, SOLUTION INTRAMUSCULAR; INTRAVENOUS at 00:06

## 2022-10-20 NOTE — DISCHARGE INSTRUCTIONS
Follow-up with your PCP.  Continue to take the antibiotics prescribed by PCP.  Suggest a referral for urology if persistent symptoms occur

## 2022-10-20 NOTE — ED PROVIDER NOTES
Subjective   History of Present Illness  61 yo female presents to the ED reporting dysuria and pressure when she urinates. Pt reportsthat she has n/v over the weekend and feels like she may have dehydrated herself. Pt reports she went to see her pcp an they checked her urine and said there was blood in it but no infection. Pt reports that hthe pressure when she urinates is unbearable.    History provided by:  Patient  Dysuria  Pain quality:  Aching and burning  Pain severity:  Moderate  Onset quality:  Gradual  Timing:  Constant  Progression:  Unchanged  Chronicity:  New  Recent urinary tract infections: no    Relieved by:  Nothing  Worsened by:  Nothing  Ineffective treatments:  None tried  Associated symptoms: no abdominal pain and no fever        Review of Systems   Constitutional: Negative.  Negative for fever.   HENT: Negative.    Respiratory: Negative.    Cardiovascular: Negative.  Negative for chest pain.   Gastrointestinal: Negative.  Negative for abdominal pain.   Endocrine: Negative.    Genitourinary: Positive for dysuria.   Skin: Negative.    Neurological: Negative.    Psychiatric/Behavioral: Negative.    All other systems reviewed and are negative.      Past Medical History:   Diagnosis Date   • Anxiety and depression    • Arthritis     Shoulders   • ASCUS with positive high risk human papillomavirus of vagina 06/22/2017   • Back pain    • COPD (chronic obstructive pulmonary disease) ([facility])    • Diverticulitis    • Dizziness    • Eczema     bilat ring finger, bilat ears    • Endometriosis    • GERD (gastroesophageal reflux disease)     h/o   • Headache    • IBS (irritable bowel syndrome)    • ICD (implantable cardioverter-defibrillator) in place     home monitoring    • Insomnia    • Long Q-T syndrome    • Myocardial infarction ([facility]) 2006   • Scoliosis    • Seizure ([facility])     2006- with med reaction to phenergan- per pt   • Tinnitus    • Wears dentures     upper only    • Wears glasses     reading  "      Allergies   Allergen Reactions   • Levaquin [Levofloxacin] Hives   • Phenergan [Promethazine Hcl] Other (See Comments)     seizure   • Promethazine Other (See Comments) and Unknown - High Severity     seizure   • Vancomycin Other (See Comments)     \"ed\" syndrome   • Doxycycline GI Intolerance   • Morphine And Related Nausea And Vomiting   • Adhesive Tape Rash   • Dicyclomine Other (See Comments)   • Flonase [Fluticasone] Other (See Comments)     \"dries my nose out really bad\"   • Sulfa Antibiotics Rash   • Wound Dressing Adhesive Unknown - High Severity       Past Surgical History:   Procedure Laterality Date   • BLADDER SUSPENSION  2000 [place] - done via laparotomy   • CARDIAC DEFIBRILLATOR PLACEMENT     • CARDIAC ELECTROPHYSIOLOGY PROCEDURE N/A 8/4/2016    Procedure: replace old icd, likely extract both old leads with OR back-up;  Surgeon: Robert Queen MD;  Location:  [facility];  Service:    • COLONOSCOPY      2010   • COLONOSCOPY N/A 9/13/2019    Procedure: COLONOSCOPY;  Surgeon: Marv Ramos MD;  Location:  [facility];  Service: Gastroenterology   • COLONOSCOPY N/A 12/13/2019    Procedure: COLONOSCOPY;  Surgeon: Marv Ramos MD;  Location:  [facility];  Service: Gastroenterology   • ENDOSCOPY     • HYSTERECTOMY  1989    Via exploratory laparotomy (transverse incision) along with incidental appendectomy.  Done for endometriosis   • LUMBAR FUSION  2015   • MENISCECTOMY Right 1993   • OOPHORECTOMY Right 1986    Done via vertical midline incision - stil have left ovary    • TONSILLECTOMY AND ADENOIDECTOMY  1973       Family History   Problem Relation Age of Onset   • Arthritis Mother    • Hypertension Mother    • Thyroid disease Mother    • Hypertension Father    • Colon cancer Sister 50   • Hypertension Brother    • Breast cancer Neg Hx    • Ovarian cancer Neg Hx        Social History     Socioeconomic History   • Marital status:    Tobacco Use "   • Smoking status: Every Day     Packs/day: 0.50     Years: 40.00     Pack years: 20.00     Types: Cigarettes     Start date: 1975   • Smokeless tobacco: Never   • Tobacco comments:     now smoking 1/2 pack per day- trying to quit now    Vaping Use   • Vaping Use: Never used   Substance and Sexual Activity   • Alcohol use: No   • Drug use: Yes     Types: Marijuana     Comment: LAST USED WEEK OF 9-2-2019   • Sexual activity: Defer     Birth control/protection: Surgical     Comment: Hysterectomy           Objective   Physical Exam  Vitals and nursing note reviewed.   Constitutional:       General: She is not in acute distress.     Appearance: Normal appearance. She is ill-appearing. She is not toxic-appearing.   HENT:      Head: Normocephalic and atraumatic.      Right Ear: Tympanic membrane normal.      Left Ear: Tympanic membrane normal.      Nose: Nose normal.      Mouth/Throat:      Mouth: Mucous membranes are moist.   Eyes:      Pupils: Pupils are equal, round, and reactive to light.   Cardiovascular:      Rate and Rhythm: Normal rate and regular rhythm.      Pulses: Normal pulses.   Pulmonary:      Effort: Pulmonary effort is normal.      Breath sounds: Normal breath sounds.   Abdominal:      General: Abdomen is flat.      Palpations: Abdomen is soft.   Musculoskeletal:      Cervical back: Normal range of motion.   Skin:     General: Skin is warm.      Capillary Refill: Capillary refill takes less than 2 seconds.   Neurological:      General: No focal deficit present.      Mental Status: She is alert and oriented to person, place, and time.   Psychiatric:         Mood and Affect: Mood normal.         Behavior: Behavior normal.         Thought Content: Thought content normal.         Procedures           ED Course                                           MDM  Number of Diagnoses or Management Options  Dysuria: new and requires workup  Lower abdominal pain: new and requires workup  Diagnosis management comments:  61 yo presentswith dysuria and lower abdominal pressure. Pt is afebrile and hemodynamically stable throughout the entire visit. Pt labs are nondiagnositic. Pt has a mild hypokalemia which is replaced orally. Pt is able to tolerate po without difficulty. CBC and rest of CMPare nnonactionable. Pt UA reveals no signs of UTI. CT scan of abdomen and pelvis reveals no acute pathology as well. Advised pt she could have symptoms of IC, bladder spasm, etc and recommend seeing a urologist. Pt reports she has one in [place] she will see. Pt is given pyridium to help with the pressure and urge to continue to urinate after the urine has stopped. Pt is hemodynamically stable and felt appropriate for discharge.       Amount and/or Complexity of Data Reviewed  Clinical lab tests: ordered and reviewed  Tests in the radiology section of CPT®: reviewed and ordered  Tests in the medicine section of CPT®: reviewed and ordered        Final diagnoses:   Dysuria   Lower abdominal pain       ED Disposition  ED Disposition     ED Disposition   Discharge    Condition   Stable    Comment   --             Paola Goins MD  [address] [place] [id number] [phone]    Schedule an appointment as soon as possible for a visit            Medication List      New Prescriptions    phenazopyridine 200 MG tablet  Commonly known as: PYRIDIUM  Take 1 tablet by mouth 3 (Three) Times a Day As Needed for Bladder Spasms.           Where to Get Your Medications      These medications were sent to [facility] [place] - [address] [place] [address], [address] - [phone]  - [phone]   [address] [facility], [address], [facility] [address]    Phone: [phone]   · phenazopyridine 200 MG tablet          More Tiwari,   10/21/22 7712

## 2022-10-31 ENCOUNTER — OFFICE VISIT (OUTPATIENT)
Dept: UROLOGY | Facility: CLINIC | Age: 62
End: 2022-10-31

## 2022-10-31 VITALS
OXYGEN SATURATION: 12 % | BODY MASS INDEX: 22.71 KG/M2 | HEART RATE: 89 BPM | SYSTOLIC BLOOD PRESSURE: 118 MMHG | HEIGHT: 64 IN | RESPIRATION RATE: 12 BRPM | DIASTOLIC BLOOD PRESSURE: 58 MMHG | TEMPERATURE: 98.6 F | WEIGHT: 133 LBS

## 2022-10-31 DIAGNOSIS — N95.8 GENITOURINARY SYNDROME OF MENOPAUSE: ICD-10-CM

## 2022-10-31 DIAGNOSIS — R39.9 LOWER URINARY TRACT SYMPTOMS (LUTS): ICD-10-CM

## 2022-10-31 DIAGNOSIS — R31.29 MICROSCOPIC HEMATURIA: Primary | ICD-10-CM

## 2022-10-31 LAB
BILIRUB BLD-MCNC: NEGATIVE MG/DL
CLARITY, POC: CLEAR
COLOR UR: YELLOW
EXPIRATION DATE: ABNORMAL
GLUCOSE UR STRIP-MCNC: NEGATIVE MG/DL
KETONES UR QL: NEGATIVE
LEUKOCYTE EST, POC: NEGATIVE
Lab: ABNORMAL
NITRITE UR-MCNC: NEGATIVE MG/ML
PH UR: 5 [PH] (ref 5–8)
PROT UR STRIP-MCNC: NEGATIVE MG/DL
RBC # UR STRIP: ABNORMAL /UL
SP GR UR: 1.03 (ref 1–1.03)
UROBILINOGEN UR QL: NORMAL

## 2022-10-31 PROCEDURE — 99204 OFFICE O/P NEW MOD 45 MIN: CPT | Performed by: NURSE PRACTITIONER

## 2022-10-31 PROCEDURE — 51798 US URINE CAPACITY MEASURE: CPT | Performed by: NURSE PRACTITIONER

## 2022-10-31 PROCEDURE — 81003 URINALYSIS AUTO W/O SCOPE: CPT | Performed by: NURSE PRACTITIONER

## 2022-10-31 RX ORDER — ESTRADIOL 0.1 MG/G
CREAM VAGINAL
Qty: 42.5 G | Refills: 3 | Status: SHIPPED | OUTPATIENT
Start: 2022-10-31

## 2022-10-31 NOTE — PROGRESS NOTES
Office Visit Hematuria Female      Patient Name: Melita Sepulveda  : 1960   MRN: [id number]     Chief Complaint: Microscopic hematuria.   Chief Complaint   Patient presents with   • microscopic hematuria   • Establish Care   • luts     Urinary pressure        Referring Provider: Keyla Garcia*    History of Present Illness: Melita Sepulveda is a 62 y.o. female who presents today for history of  who presents with microscopic hematuria.  Patient does not have current gross hematuria.   Does not burn with urination, does hurt with urinating in the bladder. Feels like her bladder is very full but she only has a small amount of urine, was having frequency, and urgency. Had frequency and urgency for years. Feel like this comes in a cycle 1 time monthly. Has been treated with antibiotics for this. Monday 1-2 weeks ago was given an IM antibiotic and sent home with oral antibiotics. This helps some but the pressure is still there.  Went to ED on 10/19/2022 CT was negative, UA negative for blood and infection.  They discussed IC and patient was prescribed Pyridium    History of smoking?    yes  History of second-hand smoking exposure? yes  History of chemotherapy?   no  History of radiation?    no  History of kidney or bladder stones?  no  History of frequent urinary tract infections? no    Subjective      Review of System:   Constitutional: No fevers or chills  Respiratory: Negative for shortness of breath or wheezing  Gastrointestinal: No constipation, nausea or vomiting  Genitourinary:  dysuria.  Musculoskeletal: No flank pain  Past Medical History:   Past Medical History:   Diagnosis Date   • Anxiety and depression    • Arthritis     Shoulders   • ASCUS with positive high risk human papillomavirus of vagina 2017   • Back pain    • COPD (chronic obstructive pulmonary disease) (HCC)    • Diverticulitis    • Dizziness    • Eczema     bilat ring finger, bilat ears    •  Endometriosis    • GERD (gastroesophageal reflux disease)     h/o   • Headache    • IBS (irritable bowel syndrome)    • ICD (implantable cardioverter-defibrillator) in place     home monitoring    • Insomnia    • Long Q-T syndrome    • Myocardial infarction (HCC) 2006   • Scoliosis    • Seizure (HCC)     2006- with med reaction to phenergan- per pt   • Tinnitus    • Wears dentures     upper only    • Wears glasses     reading       Past Surgical History:   Past Surgical History:   Procedure Laterality Date   • BLADDER SUSPENSION  2000 [place] - done via laparotomy   • CARDIAC DEFIBRILLATOR PLACEMENT     • CARDIAC ELECTROPHYSIOLOGY PROCEDURE N/A 8/4/2016    Procedure: replace old icd, likely extract both old leads with OR back-up;  Surgeon: Robert Queen MD;  Location:  [facility];  Service:    • COLONOSCOPY      2010   • COLONOSCOPY N/A 9/13/2019    Procedure: COLONOSCOPY;  Surgeon: Marv Ramso MD;  Location:  [facility];  Service: Gastroenterology   • COLONOSCOPY N/A 12/13/2019    Procedure: COLONOSCOPY;  Surgeon: Marv Ramos MD;  Location:  [facility];  Service: Gastroenterology   • ENDOSCOPY     • HYSTERECTOMY  1989    Via exploratory laparotomy (transverse incision) along with incidental appendectomy.  Done for endometriosis   • LUMBAR FUSION  2015   • MENISCECTOMY Right 1993   • OOPHORECTOMY Right 1986    Done via vertical midline incision - stil have left ovary    • TONSILLECTOMY AND ADENOIDECTOMY  1973       Family History:   Family History   Problem Relation Age of Onset   • Arthritis Mother    • Hypertension Mother    • Thyroid disease Mother    • Hypertension Father    • Colon cancer Sister 50   • Hypertension Brother    • Breast cancer Neg Hx    • Ovarian cancer Neg Hx      No family history of bladder or kidney cancer  No family history of kidney stones.     Social History:   Social History     Socioeconomic History   • Marital status:     Tobacco Use   • Smoking status: Every Day     Packs/day: 0.50     Years: 40.00     Pack years: 20.00     Types: Cigarettes     Start date: 1975   • Smokeless tobacco: Never   • Tobacco comments:     now smoking 1/2 pack per day- trying to quit now    Vaping Use   • Vaping Use: Never used   Substance and Sexual Activity   • Alcohol use: No   • Drug use: Yes     Types: Marijuana     Comment: LAST USED WEEK OF 9-2-2019   • Sexual activity: Defer     Birth control/protection: Surgical     Comment: Hysterectomy       Medications:     Current Outpatient Medications:   •  albuterol (VENTOLIN HFA) 108 (90 BASE) MCG/ACT inhaler, Inhale 2 puffs Every 4 (Four) Hours As Needed for Wheezing., Disp: 1 inhaler, Rfl: 2  •  budesonide (Pulmicort) 0.5 MG/2ML nebulizer solution, Take 2 mL by nebulization Daily., Disp: 60 mL, Rfl: 11  •  citalopram (CeleXA) 10 MG tablet, Take 2 tablets by mouth Daily., Disp: , Rfl:   •  ipratropium (ATROVENT) 0.06 % nasal spray, 2 sprays into the nostril(s) as directed by provider 4 (Four) Times a Day., Disp: 15 mL, Rfl: 12  •  ipratropium-albuterol (DUO-NEB) 0.5-2.5 mg/3 ml nebulizer, Take 2.5 mg by nebulization 2 (Two) Times a Day., Disp: , Rfl:   •  metoprolol succinate XL (TOPROL-XL) 25 MG 24 hr tablet, Take 25 mg by mouth Daily., Disp: , Rfl:   •  Naloxegol Oxalate (Movantik) 25 MG tablet, Take 25 mg by mouth., Disp: , Rfl:   •  ondansetron ODT (ZOFRAN-ODT) 4 MG disintegrating tablet, , Disp: , Rfl:   •  pantoprazole (PROTONIX) 40 MG EC tablet, Take 40 mg by mouth Daily., Disp: , Rfl:   •  potassium chloride (K-DUR,KLOR-CON) 20 MEQ CR tablet, Take 1 tablet by mouth Daily., Disp: 90 tablet, Rfl: 0  •  tiZANidine (ZANAFLEX) 4 MG tablet, Take 4 mg by mouth Every 6 (Six) Hours As Needed for Muscle Spasms., Disp: , Rfl:   •  traZODone (DESYREL) 150 MG tablet, , Disp: , Rfl:   •  estradiol (ESTRACE) 0.1 MG/GM vaginal cream, Use externally as directed, Disp: 42.5 g, Rfl: 3  •  phenazopyridine  "(PYRIDIUM) 200 MG tablet, Take 1 tablet by mouth 3 (Three) Times a Day As Needed for Bladder Spasms., Disp: 6 tablet, Rfl: 0    Allergies:   Allergies   Allergen Reactions   • Levaquin [Levofloxacin] Hives   • Phenergan [Promethazine Hcl] Other (See Comments)     seizure   • Promethazine Other (See Comments) and Unknown - High Severity     seizure   • Vancomycin Other (See Comments)     \"ed\" syndrome   • Doxycycline GI Intolerance   • Morphine And Related Nausea And Vomiting   • Adhesive Tape Rash   • Dicyclomine Other (See Comments)   • Flonase [Fluticasone] Other (See Comments)     \"dries my nose out really bad\"   • Sulfa Antibiotics Rash   • Wound Dressing Adhesive Unknown - High Severity       Objective     Physical Exam:   Vital Signs:   Vitals:    10/31/22 1130   BP: 118/58   BP Location: Right arm   Patient Position: Sitting   Cuff Size: Adult   Pulse: 89   Resp: 12   Temp: 98.6 °F (37 °C)   TempSrc: Temporal   SpO2: (!) 12%   Weight: 60.3 kg (133 lb)   Height: 163 cm (64.17\")   PainSc:   7   PainLoc: Abdomen  Comment: lower pelvic pressure/discomfort     Body mass index is 22.71 kg/m².     Constitutional: NAD, WDWN    Skin: Pink, warm and dry.  No rashes noted.  Psychiatric:  Normal mood and affect    Genitourinary  Nonpalpable bladder    Labs  Brief Urine Lab Results  (Last result in the past 365 days)      Color   Clarity   Blood   Leuk Est   Nitrite   Protein   CREAT   Urine HCG        10/31/22 1137 Yellow   Clear   2+   Negative   Negative   Negative                   Chemistry        Component Value Date/Time     10/19/2022 2242    K 3.0 (L) 10/19/2022 2242     10/19/2022 2242    CO2 25.9 10/19/2022 2242    BUN 17 10/19/2022 2242    CREATININE 0.86 10/19/2022 2242        Component Value Date/Time    CALCIUM 8.8 10/19/2022 2242    ALKPHOS 89 10/19/2022 2242    AST 13 10/19/2022 2242    ALT 5 10/19/2022 2242    BILITOT 0.3 10/19/2022 2242            Lab Results   Component Value Date    " WBC 7.92 10/19/2022    HGB 12.4 10/19/2022    HCT 35.2 10/19/2022    MCV 97.2 (H) 10/19/2022     10/19/2022       No results found for: URINECX    Radiologic Studies  CT Abdomen Pelvis With Contrast    Result Date: 10/20/2022  Impression: No bowel obstruction or definite diverticulitis. Mild colonic wall thickening may relate to incomplete distention. Other findings as above. Authenticated and Electronically Signed by Max Perez MD on 10/20/2022 01:05:42 AM      Assessment / Plan      Assessment  62 y.o. female who presents with microscopic hematuria on UA.  Patient has not had urine microscopy.  We discussed GSM and recommend patient use periurethral topical estrogen cream, cardiac risks to this are minimal due to little to no systemic absorption of medication.  We discussed IC, many of patient's symptoms sound like painful bladder recommend she monitor if she has more discomfort with a full bladder, empty bladder, and if certain foods or drinks aggravate her symptoms.  Risk factors include current smoker, over the age of 60.  We discussed in order to complete the hematuria work up we need to perform a flexible cystoscopy and acquire upper tract imaging.  Will check urine microscopy to confirm blood in urine and will schedule this on confirmation.  If microscopy is negative for blood will need no further microscopic hematuria work-up.    Plan  1.  Microscopic urinalysis  2.  Estradiol cream.  periurethral 3 times weekly  3.  Discuss IC further    Follow Up:   Return in about 4 weeks (around 11/28/2022) for Follow up Imtiaz.    ARCENIO Salter  [facility] [place]

## 2022-11-01 LAB
APPEARANCE UR: CLEAR
BACTERIA #/AREA URNS HPF: ABNORMAL /HPF
BILIRUB UR QL STRIP: NEGATIVE
CASTS URNS MICRO: ABNORMAL
COLOR UR: YELLOW
EPI CELLS #/AREA URNS HPF: ABNORMAL /HPF
GLUCOSE UR QL STRIP: NEGATIVE
HGB UR QL STRIP: ABNORMAL
KETONES UR QL STRIP: NEGATIVE
LEUKOCYTE ESTERASE UR QL STRIP: NEGATIVE
NITRITE UR QL STRIP: NEGATIVE
PH UR STRIP: 5.5 [PH] (ref 5–8)
PROT UR QL STRIP: NEGATIVE
RBC #/AREA URNS HPF: ABNORMAL /HPF
SP GR UR STRIP: 1.02 (ref 1–1.03)
UROBILINOGEN UR STRIP-MCNC: ABNORMAL MG/DL
WBC #/AREA URNS HPF: ABNORMAL /HPF

## 2022-11-07 ENCOUNTER — TRANSCRIBE ORDERS (OUTPATIENT)
Dept: ADMINISTRATIVE | Facility: HOSPITAL | Age: 62
End: 2022-11-07

## 2022-11-07 DIAGNOSIS — Z12.31 ENCOUNTER FOR SCREENING MAMMOGRAM FOR MALIGNANT NEOPLASM OF BREAST: Primary | ICD-10-CM

## 2022-11-10 ENCOUNTER — HOSPITAL ENCOUNTER (OUTPATIENT)
Dept: CT IMAGING | Facility: HOSPITAL | Age: 62
Discharge: HOME | End: 2022-11-10
Admitting: INTERNAL MEDICINE

## 2022-11-10 DIAGNOSIS — Z87.891 HISTORY OF NICOTINE DEPENDENCE: ICD-10-CM

## 2022-11-10 PROCEDURE — 71271 CT THORAX LUNG CANCER SCR C-: CPT

## 2022-11-28 ENCOUNTER — PROCEDURE VISIT (OUTPATIENT)
Dept: UROLOGY | Facility: CLINIC | Age: 62
End: 2022-11-28

## 2022-11-28 DIAGNOSIS — N95.8 GENITOURINARY SYNDROME OF MENOPAUSE: ICD-10-CM

## 2022-11-28 DIAGNOSIS — R31.29 MICROSCOPIC HEMATURIA: Primary | ICD-10-CM

## 2022-11-28 PROCEDURE — 52000 CYSTOURETHROSCOPY: CPT | Performed by: UROLOGY

## 2022-11-28 NOTE — PROGRESS NOTES
Preprocedure diagnosis  Microscopic hematuria    Postprocedure diagnosis  No abnormalities identified, only GSM    Procedure  Flexible Cystourethroscopy    Attending surgeon  Shan Lynn MD    Anesthesia  2% lidocaine jelly intraurethrally    Complications  None    Indications  62 y.o. female undergoing a flexible cystoscopy for the above mentioned indications.    Informed consent was obtained.      Findings  Cystoscopy revealed one right and left ureteral orifice in the normal anatomic position, normal bladder mucosa and no tumors, masses or stones.  No pelvic organ prolapse.  No stress incontinence.  Positive GSM.     Procedure  The patient was placed in supine position and prepped and draped in sterile fashion with lidocaine jelly per urethra for anesthesia.  A timeout was performed.  The 14F flexible cystoscope was lubricated and gently placed through the urethra and into the bladder.  The bladder was completely visualized.  The cystoscope was retroflexed and the bladder neck visualized.  The scope was withdrawn and the procedure terminated.  The patient tolerated the procedure well.      Plan  Increase the amount of estrogen she is using to about a quarter size, and use 3 times a week.  Follow-up with Imtiaz in 6 months.

## 2023-01-24 NOTE — PROGRESS NOTES
"Chief Complaint  Sleeping Problem    Subjective     History of Present Illness:  Melita Sepulveda is a 62 y.o. female with a history of COPD, MI, prolonged QT, s/p ICD, endometriosis, anxiety, and depression.  The patient is referred by Marybel Van DO with pulmonology.  At a recent visit patient reported difficulty with insomnia despite use of trazodone.  She noted that she cannot stay asleep.  Per her questionnaire the patient reports snoring, daytime sleepiness and fatigue, frequent awakening, morning headaches, heartburn/reflux, dry mouth, sore throat, nasal allergies, leg and body jerks, leg cramps, frequent nighttime urination, difficulty falling asleep, difficulty staying asleep, pain at night, night sweats, lack of concentration, and abnormal heart rhythm.  She states she \"just wants to sleep\".  The patient typically goes to bed at 8 PM waking at 5:30 AM on weekdays, and 8 PM waking at 9-10 AM on weekends.  She estimates an average of 3 to 5 hours of sleep at night and it takes her about 30 minutes to 2 hours to initially get to sleep.  She frequently wakes throughout the night.  Patient smokes cigarettes having started in 1975, drinks alcohol infrequently, and uses marijuana Gummies.  Patient also uses melatonin to help with sleep.  She does drink regular coffee 1 to 3 cups/week, regular tea 1 or 2 a month, and regular cola 1 or 2/day.    Further details are as follows:    [place] Scale is (out of 24): Total score: 6     Estimated average amount of sleep per night: 3-5  Number of times she wakes up at night: 5-6  Difficulty falling back asleep: yes  It usually takes 30 min to 2 hours to go to sleep.  She feels sleepy upon waking up: yes  Rotating or night shift work: no    Drowsiness/Sleepiness:  She exhibits the following:  excessive daytime sleepiness  excessive daytime fatigue  falls asleep watching TV    Snoring/Breathing:  She exhibits the following:  loud snoring, snores in all sleep " positions, awakens with dry mouth, sore throat when waking up in the morning, trouble breathing through nose at night, trouble breathing through nose during the day and morning headaches    Head Injury:  She exhibits the following:  No    Reflux:  She describes the following:  wakes up at night with a sour taste or burning sensation in chest    Narcolepsy:  She exhibits the following:  none    RLS/PLMs:  She describes the following:  moves or jerks during sleep  discomfort in legs with an urge to move them    Insomnia:  She describes the following:  problems initiating sleep at night  frequent awakenings  restless sleep    Parasomnia:  She exhibits the following:  excessive sweating while sleeping    Weight:       01/25/23  1015   Weight: 60.5 kg (133 lb 6.4 oz)      Weight change in the last year:  Consistent between 120-135 lb    The patient's relevant past medical, surgical, family, and social history reviewed and updated in Epic as appropriate.    Review of Systems   Constitutional: Negative.    HENT: Positive for sinus pressure and tinnitus.    Eyes: Negative.    Respiratory: Negative.    Cardiovascular: Negative.    Gastrointestinal: Positive for constipation.   Endocrine: Negative.    Genitourinary: Negative.    Musculoskeletal: Negative.    Skin: Negative.    Allergic/Immunologic: Positive for environmental allergies.   Neurological: Positive for dizziness, tremors, light-headedness and headache.   Hematological: Negative.    Psychiatric/Behavioral: Positive for agitation and decreased concentration.   All other systems reviewed and are negative.      PMH:    Past Medical History:   Diagnosis Date   • Anxiety and depression    • Arrhythmia    • Arthritis     Shoulders   • ASCUS with positive high risk human papillomavirus of vagina 06/22/2017   • Back pain    • CHF (congestive heart failure) ([facility])    • COPD (chronic obstructive pulmonary disease) ([facility])    • Diverticulitis    • Dizziness    • Eczema     bilat  ring finger, bilat ears    • Endometriosis    • GERD (gastroesophageal reflux disease)     h/o   • Headache    • IBS (irritable bowel syndrome)    • ICD (implantable cardioverter-defibrillator) in place     home monitoring    • Insomnia    • Long Q-T syndrome    • Myocardial infarction (HCC)    • Scoliosis    • Seizure (HCC)     - with med reaction to phenergan- per pt   • Tinnitus    • Wears dentures     upper only    • Wears glasses     reading     Past Surgical History:   Procedure Laterality Date   • APPENDECTOMY     • BLADDER SUSPENSION  2000 - done via laparotomy   • CARDIAC DEFIBRILLATOR PLACEMENT     • CARDIAC ELECTROPHYSIOLOGY PROCEDURE N/A 2016    Procedure: replace old icd, likely extract both old leads with OR back-up;  Surgeon: Robert Queen MD;  Location:  [facility];  Service:    • COLONOSCOPY         • COLONOSCOPY N/A 2019    Procedure: COLONOSCOPY;  Surgeon: Marv Ramos MD;  Location:  [facility];  Service: Gastroenterology   • COLONOSCOPY N/A 2019    Procedure: COLONOSCOPY;  Surgeon: Marv Ramos MD;  Location:  [facility];  Service: Gastroenterology   • ENDOSCOPY     • HYSTERECTOMY      Via exploratory laparotomy (transverse incision) along with incidental appendectomy.  Done for endometriosis   • LUMBAR FUSION     • MENISCECTOMY Right    • OOPHORECTOMY Right     Done via vertical midline incision - stil have left ovary    • TONSILLECTOMY AND ADENOIDECTOMY       OB History        3    Para   3    Term   3            AB   0    Living   3       SAB   0    IAB        Ectopic        Molar        Multiple        Live Births              Obstetric Comments    - Gabriel   - Sonja   - Marlys    GK's x 5           Allergies   Allergen Reactions   • Levaquin [Levofloxacin] Hives   • Phenergan [Promethazine Hcl] Other (See Comments)     seizure   • Promethazine Other (See  "Comments) and Unknown - High Severity     seizure   • Vancomycin Other (See Comments)     \"ed\" syndrome   • Doxycycline GI Intolerance   • Morphine And Related Nausea And Vomiting   • Adhesive Tape Rash   • Dicyclomine Other (See Comments)   • Flonase [Fluticasone] Other (See Comments)     \"dries my nose out really bad\"   • Sulfa Antibiotics Rash   • Wound Dressing Adhesive Unknown - High Severity       MEDS:  Prior to Admission medications    Medication Sig Start Date End Date Taking? Authorizing Provider   albuterol (VENTOLIN HFA) 108 (90 BASE) MCG/ACT inhaler Inhale 2 puffs Every 4 (Four) Hours As Needed for Wheezing. 4/24/17   Angelica Mathew,    budesonide (Pulmicort) 0.5 MG/2ML nebulizer solution Take 2 mL by nebulization Daily. 8/30/22   Marybel Van DO   citalopram (CeleXA) 10 MG tablet Take 2 tablets by mouth Daily. 7/13/21   Cliff Salamanca MD   estradiol (ESTRACE) 0.1 MG/GM vaginal cream Use externally as directed 10/31/22   Imtiaz Tolentino APRN   ipratropium (ATROVENT) 0.06 % nasal spray 2 sprays into the nostril(s) as directed by provider 4 (Four) Times a Day. 3/1/22   Marybel Van,    ipratropium-albuterol (DUO-NEB) 0.5-2.5 mg/3 ml nebulizer Take 2.5 mg by nebulization 2 (Two) Times a Day. 2/24/22   Cliff Salamanca MD   metoprolol succinate XL (TOPROL-XL) 25 MG 24 hr tablet Take 25 mg by mouth Daily. 10/22/21 10/31/22  Cliff Salamanca MD   Naloxegol Oxalate (Movantik) 25 MG tablet Take 25 mg by mouth. 8/24/21   Cliff Salamanca MD   ondansetron ODT (ZOFRAN-ODT) 4 MG disintegrating tablet  10/26/21   Cliff Salamanca MD   pantoprazole (PROTONIX) 40 MG EC tablet Take 40 mg by mouth Daily. 8/24/21   Cliff Salamanca MD   phenazopyridine (PYRIDIUM) 200 MG tablet Take 1 tablet by mouth 3 (Three) Times a Day As Needed for Bladder Spasms. 10/20/22   More Tiwari,    potassium chloride (K-DUR,KLOR-CON) 20 MEQ CR tablet Take 1 tablet by " "mouth Daily. 12/6/16   Angelica Mathew DO   tiZANidine (ZANAFLEX) 4 MG tablet Take 4 mg by mouth Every 6 (Six) Hours As Needed for Muscle Spasms. 8/17/18   Provider, MD Cliff   traZODone (DESYREL) 150 MG tablet  1/2/20   Emergency, Nurse Lamberto, RN       FH:  Family History   Problem Relation Age of Onset   • Sleep apnea Mother    • Arthritis Mother    • Hypertension Mother    • Thyroid disease Mother    • Parkinsonism Mother    • Hypertension Father    • Diabetes Sister    • Colon cancer Sister 50   • Hypertension Brother    • Diabetes Maternal Grandmother    • Breast cancer Neg Hx    • Ovarian cancer Neg Hx        Objective   Vital Signs:  /62 (BP Location: Right arm, Patient Position: Sitting, Cuff Size: Small Adult)   Pulse 102   Ht 162.6 cm (64\")   Wt 60.5 kg (133 lb 6.4 oz)   SpO2 90% Comment: room air  BMI 22.90 kg/m²     BMI is within normal parameters. No other follow-up for BMI required.        Physical Exam  Vitals reviewed.   Constitutional:       Appearance: Normal appearance.   HENT:      Head: Normocephalic and atraumatic.      Nose: Nose normal.      Mouth/Throat:      Mouth: Mucous membranes are moist.   Cardiovascular:      Rate and Rhythm: Normal rate and regular rhythm.      Heart sounds: No murmur heard.    No friction rub. No gallop.   Pulmonary:      Effort: Pulmonary effort is normal. No respiratory distress.      Breath sounds: Normal breath sounds. No wheezing or rhonchi.   Neurological:      Mental Status: She is alert and oriented to person, place, and time.      Motor: Tremor present.   Psychiatric:         Mood and Affect: Mood normal.         Behavior: Behavior normal.       Mallampati Score: III (soft and hard palate and base of uvula visible)    Result Review :              Assessment and Plan  Melita Sepulveda is a 62 y.o. female who presents for further evaluation of excessive daytime and sleepiness and fatigue, nonrestorative sleep, and concerns for " sleep disordered breathing and obstructive sleep apnea.  We will obtain a home sleep test for further evaluation.  The patient will return for follow-up and recommendations after test.     Of further concern is the patient's tremor.  This has been ongoing for some time and thus far has not been further evaluated.  She does have a family history of parkinsonism in her mother.  I will refer the patient to neurology for further evaluation.    Diagnoses and all orders for this visit:    1. Snoring (Primary)  -     Home Sleep Study; Future    2. Suspected sleep apnea  -     Home Sleep Study; Future    3. Excessive daytime sleepiness  -     Home Sleep Study; Future    4. Occasional tremors  -     Ambulatory Referral to Neurology                 I discussed the consequences of uncontrolled sleep apnea including hypertension, heart disease, diabetes, stroke, and dementia. I further discussed sleep apnea therapeutic options including CPAP, Weight loss, Oral dental appliance, and surgery.         Follow Up  Return for Follow up after study.  Patient was given instructions and counseling regarding her condition or for health maintenance advice. Please see specific information pulled into the AVS if appropriate.     ARCENIO Dickson, ACNP-BC  Pulmonology, Critical Care, and Sleep Medicine

## 2023-01-25 ENCOUNTER — OFFICE VISIT (OUTPATIENT)
Dept: SLEEP MEDICINE | Facility: HOSPITAL | Age: 63
End: 2023-01-25
Payer: MEDICARE

## 2023-01-25 VITALS
BODY MASS INDEX: 22.77 KG/M2 | HEIGHT: 64 IN | HEART RATE: 102 BPM | SYSTOLIC BLOOD PRESSURE: 102 MMHG | WEIGHT: 133.4 LBS | OXYGEN SATURATION: 90 % | DIASTOLIC BLOOD PRESSURE: 62 MMHG

## 2023-01-25 DIAGNOSIS — G47.19 EXCESSIVE DAYTIME SLEEPINESS: ICD-10-CM

## 2023-01-25 DIAGNOSIS — R25.1 OCCASIONAL TREMORS: ICD-10-CM

## 2023-01-25 DIAGNOSIS — R06.83 SNORING: Primary | ICD-10-CM

## 2023-01-25 DIAGNOSIS — R29.818 SUSPECTED SLEEP APNEA: ICD-10-CM

## 2023-01-25 PROCEDURE — 99214 OFFICE O/P EST MOD 30 MIN: CPT | Performed by: NURSE PRACTITIONER

## 2023-01-25 RX ORDER — METOPROLOL SUCCINATE 25 MG/1
1 TABLET, EXTENDED RELEASE ORAL DAILY
COMMUNITY
Start: 2022-10-24

## 2023-02-15 ENCOUNTER — TRANSCRIBE ORDERS (OUTPATIENT)
Dept: ADMINISTRATIVE | Facility: HOSPITAL | Age: 63
End: 2023-02-15
Payer: MEDICARE

## 2023-02-15 DIAGNOSIS — N63.42 SUBAREOLAR MASS OF LEFT BREAST: Primary | ICD-10-CM

## 2023-03-08 ENCOUNTER — HOSPITAL ENCOUNTER (OUTPATIENT)
Dept: SLEEP MEDICINE | Facility: HOSPITAL | Age: 63
Discharge: HOME | End: 2023-03-08
Admitting: NURSE PRACTITIONER
Payer: MEDICARE

## 2023-03-08 DIAGNOSIS — G47.19 EXCESSIVE DAYTIME SLEEPINESS: ICD-10-CM

## 2023-03-08 DIAGNOSIS — R06.83 SNORING: ICD-10-CM

## 2023-03-08 DIAGNOSIS — R29.818 SUSPECTED SLEEP APNEA: ICD-10-CM

## 2023-03-08 PROCEDURE — 95800 SLP STDY UNATTENDED: CPT

## 2023-03-08 PROCEDURE — 95800 SLP STDY UNATTENDED: CPT | Performed by: INTERNAL MEDICINE

## 2023-03-21 DIAGNOSIS — M25.511 ARTHRALGIA OF RIGHT SHOULDER REGION: Primary | ICD-10-CM

## 2023-03-22 ENCOUNTER — OFFICE VISIT (OUTPATIENT)
Dept: ORTHOPEDIC SURGERY | Facility: CLINIC | Age: 63
End: 2023-03-22
Payer: MEDICARE

## 2023-03-22 VITALS
HEIGHT: 64 IN | TEMPERATURE: 97.8 F | BODY MASS INDEX: 23.01 KG/M2 | SYSTOLIC BLOOD PRESSURE: 102 MMHG | DIASTOLIC BLOOD PRESSURE: 74 MMHG | WEIGHT: 134.8 LBS

## 2023-03-22 DIAGNOSIS — R20.2 ARM PARESTHESIA, RIGHT: ICD-10-CM

## 2023-03-22 DIAGNOSIS — M50.30 DDD (DEGENERATIVE DISC DISEASE), CERVICAL: ICD-10-CM

## 2023-03-22 DIAGNOSIS — M25.511 ARTHRALGIA OF RIGHT SHOULDER REGION: Primary | ICD-10-CM

## 2023-03-22 PROCEDURE — 73030 X-RAY EXAM OF SHOULDER: CPT | Performed by: PHYSICIAN ASSISTANT

## 2023-03-22 PROCEDURE — 99203 OFFICE O/P NEW LOW 30 MIN: CPT | Performed by: PHYSICIAN ASSISTANT

## 2023-03-22 PROCEDURE — 72040 X-RAY EXAM NECK SPINE 2-3 VW: CPT | Performed by: PHYSICIAN ASSISTANT

## 2023-03-22 RX ORDER — BUPRENORPHINE HYDROCHLORIDE AND NALOXONE HYDROCHLORIDE DIHYDRATE 8; 2 MG/1; MG/1
TABLET SUBLINGUAL
COMMUNITY
Start: 2023-03-01

## 2023-03-22 RX ORDER — ATORVASTATIN CALCIUM 20 MG/1
TABLET, FILM COATED ORAL
COMMUNITY
Start: 2023-02-07

## 2023-03-22 NOTE — PROGRESS NOTES
Subjective   Patient ID: Melita Sepulveda is a 62 y.o. right hand dominant female  Pain of the Right Shoulder (Right shoulder pain for years. She lifted a bag of potatoes early 2000s, got a sharp pain. Has had pain on and off since. Has had no injections. She gets injections in feet ([facility]) and back ([facility]). She does have numbness in right 5th finger, occasional hand numbness)         History of Present Illness  Patient presents with intermittent right shoulder pain in early 2000. She initially lifted a 10 lb bag of potatoes and developed discomfort to right arm.   Patient also endorses neck pain with numbness and tingling to RUE.                                                   Past Medical History:   Diagnosis Date   • Anxiety and depression    • Arrhythmia    • Arthritis     Shoulders   • ASCUS with positive high risk human papillomavirus of vagina 06/22/2017   • Back pain    • CHF (congestive heart failure) ([facility])    • COPD (chronic obstructive pulmonary disease) ([facility])    • Diverticulitis    • Dizziness    • Eczema     bilat ring finger, bilat ears    • Endometriosis    • GERD (gastroesophageal reflux disease)     h/o   • Headache    • IBS (irritable bowel syndrome)    • ICD (implantable cardioverter-defibrillator) in place     home monitoring    • Insomnia    • Long Q-T syndrome    • Myocardial infarction ([facility]) 2006   • Scoliosis    • Seizure ([facility])     2006- with med reaction to phenergan- per pt   • Tinnitus    • Wears dentures     upper only    • Wears glasses     reading        Past Surgical History:   Procedure Laterality Date   • APPENDECTOMY     • BLADDER SUSPENSION  2000    [place] - done via laparotomy   • CARDIAC DEFIBRILLATOR PLACEMENT     • CARDIAC ELECTROPHYSIOLOGY PROCEDURE N/A 08/04/2016    Procedure: replace old icd, likely extract both old leads with OR back-up;  Surgeon: Robert Queen MD;  Location: [facility] INVASIVE LOCATION;  Service:    •  COLONOSCOPY      2010   • COLONOSCOPY N/A 09/13/2019    Procedure: COLONOSCOPY;  Surgeon: Marv Ramos MD;  Location:  [facility];  Service: Gastroenterology   • COLONOSCOPY N/A 12/13/2019    Procedure: COLONOSCOPY;  Surgeon: Marv Ramos MD;  Location:  [facility];  Service: Gastroenterology   • ENDOSCOPY     • HYSTERECTOMY  1989    Via exploratory laparotomy (transverse incision) along with incidental appendectomy.  Done for endometriosis   • LUMBAR FUSION  2015   • MENISCECTOMY Right 1993   • OOPHORECTOMY Right 1986    Done via vertical midline incision - stil have left ovary    • TONSILLECTOMY AND ADENOIDECTOMY  1973       Family History   Problem Relation Age of Onset   • Sleep apnea Mother    • Arthritis Mother    • Hypertension Mother    • Thyroid disease Mother    • Parkinsonism Mother    • Hypertension Father    • Diabetes Sister    • Colon cancer Sister 50   • Hypertension Brother    • Diabetes Maternal Grandmother    • Breast cancer Neg Hx    • Ovarian cancer Neg Hx        Social History     Socioeconomic History   • Marital status:    Tobacco Use   • Smoking status: Every Day     Packs/day: 0.50     Years: 40.00     Pack years: 20.00     Types: Cigarettes     Start date: 1975   • Smokeless tobacco: Never   • Tobacco comments:     now smoking 1/2 pack per day- trying to quit now    Vaping Use   • Vaping Use: Never used   Substance and Sexual Activity   • Alcohol use: Yes     Comment: once a year   • Drug use: Yes     Types: Marijuana     Comment: gummies 1-2 times a week   • Sexual activity: Defer     Birth control/protection: Surgical     Comment: Hysterectomy         Current Outpatient Medications:   •  albuterol (VENTOLIN HFA) 108 (90 BASE) MCG/ACT inhaler, Inhale 2 puffs Every 4 (Four) Hours As Needed for Wheezing., Disp: 1 inhaler, Rfl: 2  •  atorvastatin (LIPITOR) 20 MG tablet, , Disp: , Rfl:   •  budesonide (Pulmicort) 0.5 MG/2ML nebulizer solution, Take 2 mL by  "nebulization Daily., Disp: 60 mL, Rfl: 11  •  buprenorphine-naloxone (SUBOXONE) 8-2 MG per SL tablet, , Disp: , Rfl:   •  citalopram (CeleXA) 10 MG tablet, Take 2 tablets by mouth Daily., Disp: , Rfl:   •  Diclofenac Sodium (VOLTAREN) 1 % gel gel, , Disp: , Rfl:   •  estradiol (ESTRACE) 0.1 MG/GM vaginal cream, Use externally as directed, Disp: 42.5 g, Rfl: 3  •  ipratropium (ATROVENT) 0.06 % nasal spray, 2 sprays into the nostril(s) as directed by provider 4 (Four) Times a Day., Disp: 15 mL, Rfl: 12  •  ipratropium-albuterol (DUO-NEB) 0.5-2.5 mg/3 ml nebulizer, Take 2.5 mg by nebulization 2 (Two) Times a Day., Disp: , Rfl:   •  metoprolol succinate XL (TOPROL-XL) 25 MG 24 hr tablet, Take 1 tablet by mouth Daily., Disp: , Rfl:   •  Naloxegol Oxalate (Movantik) 25 MG tablet, Take 1 tablet by mouth., Disp: , Rfl:   •  ondansetron ODT (ZOFRAN-ODT) 4 MG disintegrating tablet, , Disp: , Rfl:   •  pantoprazole (PROTONIX) 40 MG EC tablet, Take 1 tablet by mouth Daily., Disp: , Rfl:   •  potassium chloride (K-DUR,KLOR-CON) 20 MEQ CR tablet, Take 1 tablet by mouth Daily., Disp: 90 tablet, Rfl: 0  •  tiZANidine (ZANAFLEX) 4 MG tablet, Take 1 tablet by mouth Every 6 (Six) Hours As Needed for Muscle Spasms., Disp: , Rfl:   •  traZODone (DESYREL) 150 MG tablet, , Disp: , Rfl:   •  phenazopyridine (PYRIDIUM) 200 MG tablet, Take 1 tablet by mouth 3 (Three) Times a Day As Needed for Bladder Spasms. (Patient not taking: Reported on 3/22/2023), Disp: 6 tablet, Rfl: 0    Allergies   Allergen Reactions   • Levaquin [Levofloxacin] Hives   • Phenergan [Promethazine Hcl] Other (See Comments)     seizure   • Promethazine Other (See Comments) and Unknown - High Severity     seizure   • Vancomycin Other (See Comments)     \"ed\" syndrome   • Doxycycline GI Intolerance   • Morphine And Related Nausea And Vomiting   • Adhesive Tape Rash   • Dicyclomine Other (See Comments)   • Flonase [Fluticasone] Other (See Comments)     \"dries my nose out " "really bad\"   • Sulfa Antibiotics Rash   • Wound Dressing Adhesive Unknown - High Severity       Review of Systems   Constitutional: Negative for fever.   HENT: Negative for dental problem and voice change.    Eyes: Negative for visual disturbance.   Respiratory: Negative for shortness of breath.    Cardiovascular: Negative for chest pain.   Gastrointestinal: Negative for abdominal pain.   Genitourinary: Negative for dysuria.   Musculoskeletal: Positive for arthralgias. Negative for gait problem and joint swelling.   Skin: Negative for rash.   Neurological: Positive for numbness. Negative for speech difficulty.   Hematological: Does not bruise/bleed easily.   Psychiatric/Behavioral: Negative for confusion.       I have reviewed the medical and surgical history, family history, social history, medications, and/or allergies, and the review of systems of this report.    Objective   /74   Temp 97.8 °F (36.6 °C)   Ht 162.6 cm (64\")   Wt 61.1 kg (134 lb 12.8 oz)   LMP 09/12/1989 (LMP Unknown)   BMI 23.14 kg/m²    Physical Exam  Vitals and nursing note reviewed.   Constitutional:       Appearance: Normal appearance.   Pulmonary:      Effort: Pulmonary effort is normal.   Neurological:      Mental Status: She is alert and oriented to person, place, and time.       Back Exam     Tenderness   The patient is experiencing tenderness in the cervical.      Right Shoulder Exam     Tenderness   The patient is experiencing tenderness in the acromioclavicular joint.    Range of Motion   Active abduction: 130   Forward flexion: 130   Internal rotation 0 degrees: Sacrum   Internal rotation 90 degrees: 70     Muscle Strength   Abduction: 4/5     Tests   Drop arm: negative    Other   Erythema: absent  Sensation: normal  Pulse: present             Neurologic Exam     Mental Status   Oriented to person, place, and time.        + spurling's test       Assessment & Plan   Independent Review of Radiographic Studies:    X-ray of the " right shoulder 3 view performed in the clinic independently reviewed for the evaluation of shoulder pain.  No comparison films available to view.  No acute fracture or dislocation.    X-ray cervical spine 2 view performed in the clinic independently reviewed for the evaluation of cervical radicular pain.  No comparison films available view.  No acute fracture or dislocation.  There does appear to be multilevel degenerative changes with endplate spurring.      Procedures       Diagnoses and all orders for this visit:    1. Arthralgia of right shoulder region (Primary)  -     XR Spine Cervical 2 or 3 View; Future  -     XR Spine Cervical 2 View    2. Arm paresthesia, right  -     EMG & Nerve Conduction Test  -     XR Spine Cervical 2 or 3 View; Future  -     XR Spine Cervical 2 View    3. DDD (degenerative disc disease), cervical       Orthopedic activities reviewed and patient expressed appreciation  Discussion of orthopedic goals  Risk, benefits, and merits of treatment alternatives reviewed with the patient and questions answered  Reduced physical activity as appropriate  Weight bearing parameters reviewed    Recommendations/Plan:  Exercise, medications, injections, other patient advice, and return appointment as noted.  Patient is encouraged to call or return for any issues or concerns.  Patient cannot have MRI d/t Defibrillator.       Patient agreeable to call or return sooner for any concerns.        EMR Dragon-transcription disclaimer:  This encounter note is an electronic transcription of spoken language to printed text.  Electronic transcription of spoken language may permit erroneous or at times nonsensical words or phrases to be inadvertently transcribed.  Although I have reviewed the note for such errors, some may still exist

## 2023-03-27 RX ORDER — IPRATROPIUM BROMIDE 42 UG/1
SPRAY, METERED NASAL
Qty: 15 ML | Refills: 1 | Status: SHIPPED | OUTPATIENT
Start: 2023-03-27

## 2023-03-30 ENCOUNTER — TELEPHONE (OUTPATIENT)
Dept: SLEEP MEDICINE | Facility: CLINIC | Age: 63
End: 2023-03-30
Payer: MEDICARE

## 2023-03-30 NOTE — TELEPHONE ENCOUNTER
I SPOKE WITH LILLIANA ABOUT HER SLEEP STUDY RESULTS AND SHE WANTED TO DISCUSS HER OPTIONS BEFORE STARTING CPAP.

## 2023-03-31 NOTE — PROGRESS NOTES
"Sleep Clinic Follow Up Note    Chief Complaint  Sleep Apnea (Follow up )    Subjective     History of Present Illness (from previous encounter on 1/25/2023):  Melita Sepulveda is a 62 y.o. female with a history of COPD, MI, prolonged QT, s/p ICD, endometriosis, anxiety, and depression.  The patient is referred by Marybel Van DO with pulmonology.  At a recent visit patient reported difficulty with insomnia despite use of trazodone.  She noted that she cannot stay asleep.  Per her questionnaire the patient reports snoring, daytime sleepiness and fatigue, frequent awakening, morning headaches, heartburn/reflux, dry mouth, sore throat, nasal allergies, leg and body jerks, leg cramps, frequent nighttime urination, difficulty falling asleep, difficulty staying asleep, pain at night, night sweats, lack of concentration, and abnormal heart rhythm.  She states she \"just wants to sleep\".  The patient typically goes to bed at 8 PM waking at 5:30 AM on weekdays, and 8 PM waking at 9-10 AM on weekends.  She estimates an average of 3 to 5 hours of sleep at night and it takes her about 30 minutes to 2 hours to initially get to sleep.  She frequently wakes throughout the night.  Patient smokes cigarettes having started in 1975, drinks alcohol infrequently, and uses marijuana Gummies.  Patient also uses melatonin to help with sleep.  She does drink regular coffee 1 to 3 cups/week, regular tea 1 or 2 a month, and regular cola 1 or 2/day.    -A home sleep test was obtained on 3/9/2023 patient was found with mild obstructive sleep apnea.  pAHI (4%) 6.7, pAHI (3%) 31.3.    Interval History:  Melita Sepulveda is a 62 y.o. female who presents for follow-up after sleep study.  She was found with mild obstructive sleep apnea.  Recommendation is for PAP therapy. Her 02 sat is in the low 90's this am.       Further details are as follows:    [place] Scale is: 3/24      Weight:    Weight change in the last year:  loss: 2 " lbs    The patient's relevant past medical, surgical, family, and social history reviewed and updated in Epic as appropriate.    PMH:    Past Medical History:   Diagnosis Date   • Anxiety and depression    • Arrhythmia    • Arthritis     Shoulders   • ASCUS with positive high risk human papillomavirus of vagina 06/22/2017   • Back pain    • CHF (congestive heart failure)    • COPD (chronic obstructive pulmonary disease)    • Diverticulitis    • Dizziness    • Eczema     bilat ring finger, bilat ears    • Endometriosis    • GERD (gastroesophageal reflux disease)     h/o   • Headache    • IBS (irritable bowel syndrome)    • ICD (implantable cardioverter-defibrillator) in place     home monitoring    • Insomnia    • Long Q-T syndrome    • Myocardial infarction 2006   • Scoliosis    • Seizure     2006- with med reaction to phenergan- per pt   • Tinnitus    • Wears dentures     upper only    • Wears glasses     reading     Past Surgical History:   Procedure Laterality Date   • APPENDECTOMY     • BLADDER SUSPENSION  2000 [place] - done via laparotomy   • CARDIAC DEFIBRILLATOR PLACEMENT     • CARDIAC ELECTROPHYSIOLOGY PROCEDURE N/A 08/04/2016    Procedure: replace old icd, likely extract both old leads with OR back-up;  Surgeon: Robert Queen MD;  Location:  [facility];  Service:    • COLONOSCOPY      2010   • COLONOSCOPY N/A 09/13/2019    Procedure: COLONOSCOPY;  Surgeon: Marv Ramos MD;  Location:  [facility];  Service: Gastroenterology   • COLONOSCOPY N/A 12/13/2019    Procedure: COLONOSCOPY;  Surgeon: Marv Ramos MD;  Location:  [facility];  Service: Gastroenterology   • ENDOSCOPY     • HYSTERECTOMY  1989    Via exploratory laparotomy (transverse incision) along with incidental appendectomy.  Done for endometriosis   • LUMBAR FUSION  2015   • MENISCECTOMY Right 1993   • OOPHORECTOMY Right 1986    Done via vertical midline incision - stil have left ovary    •  "TONSILLECTOMY AND ADENOIDECTOMY       OB History        3    Para   3    Term   3            AB   0    Living   3       SAB   0    IAB        Ectopic        Molar        Multiple        Live Births              Obstetric Comments    - Gabriel   - Sonja   - Marlys    GK's x 5           Allergies   Allergen Reactions   • Levaquin [Levofloxacin] Hives   • Phenergan [Promethazine Hcl] Other (See Comments)     seizure   • Promethazine Other (See Comments) and Unknown - High Severity     seizure   • Vancomycin Other (See Comments)     \"ed\" syndrome   • Doxycycline GI Intolerance   • Morphine And Related Nausea And Vomiting   • Adhesive Tape Rash   • Dicyclomine Other (See Comments)   • Flonase [Fluticasone] Other (See Comments)     \"dries my nose out really bad\"   • Sulfa Antibiotics Rash   • Wound Dressing Adhesive Unknown - High Severity       MEDS:  Prior to Admission medications    Medication Sig Start Date End Date Taking? Authorizing Provider   albuterol (VENTOLIN HFA) 108 (90 BASE) MCG/ACT inhaler Inhale 2 puffs Every 4 (Four) Hours As Needed for Wheezing. 17   Angelica Mathew DO   atorvastatin (LIPITOR) 20 MG tablet  23   Provider, MD Cliff   budesonide (Pulmicort) 0.5 MG/2ML nebulizer solution Take 2 mL by nebulization Daily. 22   Marybel Van DO   buprenorphine-naloxone (SUBOXONE) 8-2 MG per SL tablet  3/1/23   Provider, MD Cliff   citalopram (CeleXA) 10 MG tablet Take 2 tablets by mouth Daily. 21   Provider, MD Cliff   Diclofenac Sodium (VOLTAREN) 1 % gel gel  23   Provider, MD Cliff   estradiol (ESTRACE) 0.1 MG/GM vaginal cream Use externally as directed 10/31/22   Imtiaz Tolentino APRN   ipratropium (ATROVENT) 0.06 % nasal spray INSTILL 2 SPRAYS IN EACH NOSTRIL FOUR TIMES DAILY AS DIRECTED 3/27/23   Marybel Van DO   ipratropium-albuterol (DUO-NEB) 0.5-2.5 mg/3 ml nebulizer Take 2.5 mg by nebulization 2 (Two) Times a " "Day. 2/24/22   Cliff Salamanca MD   metoprolol succinate XL (TOPROL-XL) 25 MG 24 hr tablet Take 1 tablet by mouth Daily. 10/24/22   Cliff Salamanca MD   Naloxegol Oxalate (Movantik) 25 MG tablet Take 1 tablet by mouth. 8/24/21   Cliff Salamanca MD   ondansetron ODT (ZOFRAN-ODT) 4 MG disintegrating tablet  10/26/21   Cliff Salamanca MD   pantoprazole (PROTONIX) 40 MG EC tablet Take 1 tablet by mouth Daily. 8/24/21   Cliff Salamanca MD   phenazopyridine (PYRIDIUM) 200 MG tablet Take 1 tablet by mouth 3 (Three) Times a Day As Needed for Bladder Spasms.  Patient not taking: Reported on 3/22/2023 10/20/22   More Tiwari DO   potassium chloride (K-DUR,KLOR-CON) 20 MEQ CR tablet Take 1 tablet by mouth Daily. 12/6/16   Angelica Mathew DO   tiZANidine (ZANAFLEX) 4 MG tablet Take 1 tablet by mouth Every 6 (Six) Hours As Needed for Muscle Spasms. 8/17/18   Cliff Salamanca MD   traZODone (DESYREL) 150 MG tablet  1/2/20   Emergency, Nurse Lamberto, RN         FH:  Family History   Problem Relation Age of Onset   • Sleep apnea Mother    • Arthritis Mother    • Hypertension Mother    • Thyroid disease Mother    • Parkinsonism Mother    • Hypertension Father    • Diabetes Sister    • Colon cancer Sister 50   • Hypertension Brother    • Diabetes Maternal Grandmother    • Breast cancer Neg Hx    • Ovarian cancer Neg Hx        Objective   Vital Signs:  BP 96/67   Pulse 104   Temp 98 °F (36.7 °C) (Infrared)   Ht 162.6 cm (64\")   Wt 60.1 kg (132 lb 9.6 oz)   SpO2 92% Comment: resting room air  BMI 22.76 kg/m²     BMI is within normal parameters. No other follow-up for BMI required.        Physical Exam  Vitals reviewed.   Constitutional:       Appearance: Normal appearance.   HENT:      Head: Normocephalic and atraumatic.      Nose: Nose normal.      Mouth/Throat:      Mouth: Mucous membranes are moist.   Cardiovascular:      Rate and Rhythm: Normal rate and regular rhythm.      " "Heart sounds: No murmur heard.    No friction rub. No gallop.   Pulmonary:      Effort: Pulmonary effort is normal. No respiratory distress.      Breath sounds: Normal breath sounds. No wheezing or rhonchi.   Neurological:      Mental Status: She is alert and oriented to person, place, and time.   Psychiatric:         Behavior: Behavior normal.             Result Review :              Assessment and Plan  Melita Sepulveda is a 62 y.o. female who returns for follow-up after home sleep test.  Patient was found with mild obstructive sleep apnea.  Recommendation is for PAP therapy.  I have also discussed alternatives including mandibular advancement appliance, and referral to otolaryngology for surgical consult. She would like to see ENT for consult. She has difficult with claustrophobia and is concerned she may not be able to wear a mask.  We did discuss possibility of nasal pillows and she stated that this be an option as \"last resort.\"  I would like for the patient return for follow-up approximately 6 months for recheck and possibility of new home test for further evaluation after surgical intervention.  I note the patient has decreased oxygen saturation this morning.  She is not on oxygen at home but reports that since severe weather she has had difficulty with congestion and has had to use her nebulizer quite a bit.  No wheezes, rhonchi, or rales are noted on physical exam.    Diagnoses and all orders for this visit:    1. Obstructive sleep apnea, adult (Primary)  -     Ambulatory Referral to ENT (Otolaryngology)                        Follow Up  Return in about 6 months (around 10/3/2023) for Recheck.  Patient was given instructions and counseling regarding her condition or for health maintenance advice. Please see specific information pulled into the AVS if appropriate.       Gordy Johnson, ARCENIO, ACNP-BC  Pulmonology, Critical Care, and Sleep Medicine    "

## 2023-04-03 ENCOUNTER — OFFICE VISIT (OUTPATIENT)
Dept: SLEEP MEDICINE | Facility: CLINIC | Age: 63
End: 2023-04-03
Payer: MEDICARE

## 2023-04-03 VITALS
TEMPERATURE: 98 F | WEIGHT: 132.6 LBS | HEIGHT: 64 IN | BODY MASS INDEX: 22.64 KG/M2 | SYSTOLIC BLOOD PRESSURE: 96 MMHG | OXYGEN SATURATION: 92 % | HEART RATE: 104 BPM | DIASTOLIC BLOOD PRESSURE: 67 MMHG

## 2023-04-03 DIAGNOSIS — G47.33 OBSTRUCTIVE SLEEP APNEA, ADULT: Primary | ICD-10-CM

## 2023-04-03 PROCEDURE — 99213 OFFICE O/P EST LOW 20 MIN: CPT | Performed by: NURSE PRACTITIONER

## 2023-04-03 PROCEDURE — 3078F DIAST BP <80 MM HG: CPT | Performed by: NURSE PRACTITIONER

## 2023-04-03 PROCEDURE — 1159F MED LIST DOCD IN RCRD: CPT | Performed by: NURSE PRACTITIONER

## 2023-04-03 PROCEDURE — 1160F RVW MEDS BY RX/DR IN RCRD: CPT | Performed by: NURSE PRACTITIONER

## 2023-04-03 PROCEDURE — 3074F SYST BP LT 130 MM HG: CPT | Performed by: NURSE PRACTITIONER

## 2023-04-12 ENCOUNTER — PROCEDURE VISIT (OUTPATIENT)
Dept: ORTHOPEDIC SURGERY | Facility: CLINIC | Age: 63
End: 2023-04-12
Payer: MEDICARE

## 2023-04-12 DIAGNOSIS — R20.2 PARESTHESIA: ICD-10-CM

## 2023-04-12 DIAGNOSIS — M79.601 RIGHT UPPER LIMB PAIN: ICD-10-CM

## 2023-04-12 DIAGNOSIS — M54.12 BRACHIAL NEURITIS: ICD-10-CM

## 2023-04-12 PROCEDURE — 95911 NRV CNDJ TEST 9-10 STUDIES: CPT | Performed by: PHYSICAL THERAPIST

## 2023-04-12 PROCEDURE — 95886 MUSC TEST DONE W/N TEST COMP: CPT | Performed by: PHYSICAL THERAPIST

## 2023-04-20 ENCOUNTER — OFFICE VISIT (OUTPATIENT)
Dept: ORTHOPEDIC SURGERY | Facility: CLINIC | Age: 63
End: 2023-04-20
Payer: MEDICARE

## 2023-04-20 VITALS
TEMPERATURE: 97.3 F | DIASTOLIC BLOOD PRESSURE: 80 MMHG | BODY MASS INDEX: 22.53 KG/M2 | SYSTOLIC BLOOD PRESSURE: 124 MMHG | WEIGHT: 132 LBS | HEIGHT: 64 IN

## 2023-04-20 DIAGNOSIS — M79.601 RIGHT UPPER LIMB PAIN: Primary | ICD-10-CM

## 2023-04-20 DIAGNOSIS — M25.511 ARTHRALGIA OF RIGHT SHOULDER REGION: ICD-10-CM

## 2023-04-20 DIAGNOSIS — M75.41 IMPINGEMENT SYNDROME OF RIGHT SHOULDER: ICD-10-CM

## 2023-04-20 RX ORDER — LIDOCAINE HYDROCHLORIDE 10 MG/ML
2 INJECTION, SOLUTION INFILTRATION; PERINEURAL
Status: COMPLETED | OUTPATIENT
Start: 2023-04-20 | End: 2023-04-20

## 2023-04-20 RX ORDER — METHYLPREDNISOLONE ACETATE 40 MG/ML
40 INJECTION, SUSPENSION INTRA-ARTICULAR; INTRALESIONAL; INTRAMUSCULAR; SOFT TISSUE
Status: COMPLETED | OUTPATIENT
Start: 2023-04-20 | End: 2023-04-20

## 2023-04-20 RX ADMIN — METHYLPREDNISOLONE ACETATE 40 MG: 40 INJECTION, SUSPENSION INTRA-ARTICULAR; INTRALESIONAL; INTRAMUSCULAR; SOFT TISSUE at 11:11

## 2023-04-20 RX ADMIN — LIDOCAINE HYDROCHLORIDE 2 ML: 10 INJECTION, SOLUTION INFILTRATION; PERINEURAL at 11:11

## 2023-04-20 NOTE — PROGRESS NOTES
Subjective   Patient ID: Melita Sepulveda is a 62 y.o. right hand dominant female  Follow-up of the Right Shoulder (Discuss EMG results)         History of Present Illness    Patient presents to review EMG results of the UE.  She mentions the shoulder is still painful.         Past Medical History:   Diagnosis Date   • Anxiety and depression    • Arrhythmia    • Arthritis     Shoulders   • ASCUS with positive high risk human papillomavirus of vagina 06/22/2017   • Back pain    • CHF (congestive heart failure)    • COPD (chronic obstructive pulmonary disease)    • Diverticulitis    • Dizziness    • Eczema     bilat ring finger, bilat ears    • Endometriosis    • GERD (gastroesophageal reflux disease)     h/o   • Headache    • IBS (irritable bowel syndrome)    • ICD (implantable cardioverter-defibrillator) in place     home monitoring    • Insomnia    • Long Q-T syndrome    • Myocardial infarction 2006   • Scoliosis    • Seizure     2006- with med reaction to phenergan- per pt   • Tinnitus    • Wears dentures     upper only    • Wears glasses     reading        Past Surgical History:   Procedure Laterality Date   • APPENDECTOMY     • BLADDER SUSPENSION  2000 [place] - done via laparotomy   • CARDIAC DEFIBRILLATOR PLACEMENT     • CARDIAC ELECTROPHYSIOLOGY PROCEDURE N/A 08/04/2016    Procedure: replace old icd, likely extract both old leads with OR back-up;  Surgeon: Robert Queen MD;  Location:  [facility];  Service:    • COLONOSCOPY      2010   • COLONOSCOPY N/A 09/13/2019    Procedure: COLONOSCOPY;  Surgeon: Marv Ramos MD;  Location:  [facility];  Service: Gastroenterology   • COLONOSCOPY N/A 12/13/2019    Procedure: COLONOSCOPY;  Surgeon: Marv Ramos MD;  Location:  [facility];  Service: Gastroenterology   • ENDOSCOPY     • HYSTERECTOMY  1989    Via exploratory laparotomy (transverse incision) along with incidental appendectomy.  Done for endometriosis   •  LUMBAR FUSION  2015   • MENISCECTOMY Right 1993   • OOPHORECTOMY Right 1986    Done via vertical midline incision - stil have left ovary    • TONSILLECTOMY AND ADENOIDECTOMY  1973       Family History   Problem Relation Age of Onset   • Sleep apnea Mother    • Arthritis Mother    • Hypertension Mother    • Thyroid disease Mother    • Parkinsonism Mother    • Hypertension Father    • Diabetes Sister    • Colon cancer Sister 50   • Hypertension Brother    • Diabetes Maternal Grandmother    • Breast cancer Neg Hx    • Ovarian cancer Neg Hx        Social History     Socioeconomic History   • Marital status:    Tobacco Use   • Smoking status: Every Day     Packs/day: 0.50     Years: 40.00     Pack years: 20.00     Types: Cigarettes     Start date: 1975   • Smokeless tobacco: Never   • Tobacco comments:     now smoking 1/2 pack per day- trying to quit now    Vaping Use   • Vaping Use: Never used   Substance and Sexual Activity   • Alcohol use: Yes     Comment: once a year   • Drug use: Yes     Types: Marijuana     Comment: gummies 1-2 times a week   • Sexual activity: Defer     Birth control/protection: Surgical     Comment: Hysterectomy         Current Outpatient Medications:   •  albuterol (VENTOLIN HFA) 108 (90 BASE) MCG/ACT inhaler, Inhale 2 puffs Every 4 (Four) Hours As Needed for Wheezing., Disp: 1 inhaler, Rfl: 2  •  atorvastatin (LIPITOR) 20 MG tablet, , Disp: , Rfl:   •  budesonide (Pulmicort) 0.5 MG/2ML nebulizer solution, Take 2 mL by nebulization Daily., Disp: 60 mL, Rfl: 11  •  buprenorphine-naloxone (SUBOXONE) 8-2 MG per SL tablet, , Disp: , Rfl:   •  citalopram (CeleXA) 10 MG tablet, Take 2 tablets by mouth Daily., Disp: , Rfl:   •  Diclofenac Sodium (VOLTAREN) 1 % gel gel, , Disp: , Rfl:   •  estradiol (ESTRACE) 0.1 MG/GM vaginal cream, Use externally as directed, Disp: 42.5 g, Rfl: 3  •  ipratropium (ATROVENT) 0.06 % nasal spray, INSTILL 2 SPRAYS IN EACH NOSTRIL FOUR TIMES DAILY AS DIRECTED, Disp:  "15 mL, Rfl: 1  •  ipratropium-albuterol (DUO-NEB) 0.5-2.5 mg/3 ml nebulizer, Take 2.5 mg by nebulization 2 (Two) Times a Day., Disp: , Rfl:   •  metoprolol succinate XL (TOPROL-XL) 25 MG 24 hr tablet, Take 1 tablet by mouth Daily., Disp: , Rfl:   •  Naloxegol Oxalate (Movantik) 25 MG tablet, Take 1 tablet by mouth., Disp: , Rfl:   •  ondansetron ODT (ZOFRAN-ODT) 4 MG disintegrating tablet, , Disp: , Rfl:   •  pantoprazole (PROTONIX) 40 MG EC tablet, Take 1 tablet by mouth Daily., Disp: , Rfl:   •  phenazopyridine (PYRIDIUM) 200 MG tablet, Take 1 tablet by mouth 3 (Three) Times a Day As Needed for Bladder Spasms., Disp: 6 tablet, Rfl: 0  •  potassium chloride (K-DUR,KLOR-CON) 20 MEQ CR tablet, Take 1 tablet by mouth Daily., Disp: 90 tablet, Rfl: 0  •  tiZANidine (ZANAFLEX) 4 MG tablet, Take 1 tablet by mouth Every 6 (Six) Hours As Needed for Muscle Spasms., Disp: , Rfl:   •  traZODone (DESYREL) 150 MG tablet, , Disp: , Rfl:     Allergies   Allergen Reactions   • Levaquin [Levofloxacin] Hives   • Phenergan [Promethazine Hcl] Other (See Comments)     seizure   • Promethazine Other (See Comments) and Unknown - High Severity     seizure   • Vancomycin Other (See Comments)     \"ed\" syndrome   • Doxycycline GI Intolerance   • Morphine And Related Nausea And Vomiting   • Adhesive Tape Rash   • Dicyclomine Other (See Comments)   • Flonase [Fluticasone] Other (See Comments)     \"dries my nose out really bad\"   • Sulfa Antibiotics Rash   • Wound Dressing Adhesive Unknown - High Severity       Review of Systems   Constitutional: Negative for fever.   HENT: Negative for dental problem and voice change.    Eyes: Negative for visual disturbance.   Respiratory: Negative for shortness of breath.    Cardiovascular: Negative for chest pain.   Gastrointestinal: Negative for abdominal pain.   Genitourinary: Negative for dysuria.   Musculoskeletal: Positive for arthralgias. Negative for gait problem and joint swelling.   Skin: " "Negative for rash.   Neurological: Negative for speech difficulty.   Hematological: Does not bruise/bleed easily.   Psychiatric/Behavioral: Negative for confusion.       I have reviewed the medical and surgical history, family history, social history, medications, and/or allergies, and the review of systems of this report.    Objective   /80   Temp 97.3 °F (36.3 °C)   Ht 162.6 cm (64\")   Wt 59.9 kg (132 lb)   LMP 09/12/1989 (LMP Unknown)   BMI 22.66 kg/m²    Physical Exam  Vitals and nursing note reviewed.   Constitutional:       Appearance: Normal appearance.   Pulmonary:      Effort: Pulmonary effort is normal.   Musculoskeletal:      Right shoulder: Tenderness and crepitus present. No deformity.   Neurological:      Mental Status: She is alert and oriented to person, place, and time.       Right Shoulder Exam     Tenderness   The patient is experiencing tenderness in the acromioclavicular joint.    Range of Motion   Active abduction: 120   Passive abduction: 130     Tests   Trevino test: positive  Cross arm: positive  Impingement: positive  Drop arm: negative  Sulcus: absent    Other   Sensation: normal  Pulse: present             Neurologic Exam     Mental Status   Oriented to person, place, and time.            Assessment & Plan   Independent Review of Radiographic Studies:    No new imaging done today.    I reviewed the EMG results with the patient.  There is no evidence of abnormalities noted on the upper extremity EMG.  Large Joint Arthrocentesis: R subacromial bursa  Date/Time: 4/20/2023 11:11 AM  Consent given by: patient  Site marked: site marked  Timeout: Immediately prior to procedure a time out was called to verify the correct patient, procedure, equipment, support staff and site/side marked as required   Supporting Documentation  Indications: pain   Procedure Details  Location: shoulder - R subacromial bursa  Preparation: Patient was prepped and draped in the usual sterile fashion  Needle " "size: 22 G  Approach: posterior  Medications administered: 40 mg methylPREDNISolone acetate 40 MG/ML; 2 mL lidocaine 1 %  Patient tolerance: patient tolerated the procedure well with no immediate complications        She mentions she has tolerated steroid injections in her feet well in the past.     Diagnoses and all orders for this visit:    1. Right upper limb pain (Primary)    2. Arthralgia of right shoulder region  -     Ambulatory Referral to Physical Therapy Ortho, Evaluate and treat; Heat, Electrotherapy; Strengthening, Stretching    3. Impingement syndrome of right shoulder  -     Ambulatory Referral to Physical Therapy Ortho, Evaluate and treat; Heat, Electrotherapy; Strengthening, Stretching    Other orders  -     Large Joint Arthrocentesis       Orthopedic activities reviewed and patient expressed appreciation  Discussion of orthopedic goals  Risk, benefits, and merits of treatment alternatives reviewed with the patient and questions answered  Call or notify for any adverse effect from injection therapy    Recommendations/Plan:  Exercise, medications, injections, other patient advice, and return appointment as noted.  Patient is encouraged to call or return for any issues or concerns.    Follow up in 3months  She cannot have MRI due to pacemaker.   I would like to try therapy and the cortisone injection today before offering her a diagnostic right shoulder arthroscopy.  She is content with the plan of care stating \"I would rather not have surgery if possible\"  Patient agreeable to call or return sooner for any concerns.                 EMR Dragon-transcription disclaimer:  This encounter note is an electronic transcription of spoken language to printed text.  Electronic transcription of spoken language may permit erroneous or at times nonsensical words or phrases to be inadvertently transcribed.  Although I have reviewed the note for such errors, some may still exist  "

## 2023-05-03 ENCOUNTER — HOSPITAL ENCOUNTER (OUTPATIENT)
Dept: ULTRASOUND IMAGING | Facility: HOSPITAL | Age: 63
Discharge: HOME | End: 2023-05-03
Payer: MEDICARE

## 2023-05-03 ENCOUNTER — HOSPITAL ENCOUNTER (OUTPATIENT)
Dept: MAMMOGRAPHY | Facility: HOSPITAL | Age: 63
Discharge: HOME | End: 2023-05-03
Payer: MEDICARE

## 2023-05-03 DIAGNOSIS — N63.42 SUBAREOLAR MASS OF LEFT BREAST: ICD-10-CM

## 2023-05-03 PROCEDURE — 76642 ULTRASOUND BREAST LIMITED: CPT

## 2023-05-03 PROCEDURE — 77066 DX MAMMO INCL CAD BI: CPT

## 2023-05-03 PROCEDURE — G0279 TOMOSYNTHESIS, MAMMO: HCPCS

## 2023-05-11 ENCOUNTER — TREATMENT (OUTPATIENT)
Dept: PHYSICAL THERAPY | Facility: CLINIC | Age: 63
End: 2023-05-11
Payer: MEDICARE

## 2023-05-11 DIAGNOSIS — G89.29 CHRONIC RIGHT SHOULDER PAIN: Primary | ICD-10-CM

## 2023-05-11 DIAGNOSIS — M25.511 CHRONIC RIGHT SHOULDER PAIN: Primary | ICD-10-CM

## 2023-05-11 NOTE — PROGRESS NOTES
Physical Therapy Initial Evaluation and Plan of Care  [address] [place] [address]      Patient: Melita Sepulveda   : 1960  Diagnosis/ICD-10 Code:  Chronic right shoulder pain [M25.511, G89.29]  Referring practitioner: ROB Chowdhury  Date of Initial Visit: 2023  Today's Date: 2023  Patient seen for 1 session         Visit Diagnoses:    ICD-10-CM ICD-9-CM   1. Chronic right shoulder pain  M25.511 719.41    G89.29 338.29         Subjective Questionnaire: QuickDASH: 53      Subjective Evaluation    History of Present Illness    Subjective comment: States about  she was picking up a 10lb bag of potatoes when she had pain in the R shoulder. 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 Pain has recently started hurting her again. Had back surgery with a fusion. Once pain starts hurting it continues to hurt.  Patient Occupation: disabled Quality of life: fair    Pain  At best pain ratin  At worst pain ratin  Quality: dull ache (popping)  Relieving factors: rest and heat  Aggravating factors: lifting, movement, overhead activity, sleeping, outstretched reach and repetitive movement (phone use )  Progression: worsening    Social Support  Lives with: spouse    Hand dominance: right    Diagnostic Tests  X-ray: normal    Treatments  No previous or current treatments  Patient Goals  Patient goals for therapy: decreased pain, increased motion, increased strength and independence with ADLs/IADLs             Objective          Static Posture     Head  Forward.    Shoulders  Elevated and rounded.    Active Range of Motion   Left Shoulder   Normal active range of motion    Right Shoulder   Flexion: 110 degrees   Abduction: 90 degrees     Additional Active Range of Motion Details  L shoulder ROM: ER C7,  IR T7   R shoulder ROM: ER unable, IR T12       Strength/Myotome Testing     Left Shoulder     Planes of Motion   Flexion: 4+    Abduction: 4+   External rotation at 0°: 4+   Internal rotation at 0°: 4+     Right Shoulder     Planes of Motion   Flexion: 3+   Abduction: 3+   External rotation at 0°: 3+   Internal rotation at 0°: 3+     Tests     Right Shoulder   Positive belly press, drop arm, empty can, full can and Speed's.           Assessment & Plan     Assessment  Impairments: abnormal muscle tone, abnormal or restricted ROM, activity intolerance, impaired physical strength and pain with function  Functional Limitations: carrying objects, lifting, sleeping, pulling, pushing, uncomfortable because of pain, reaching behind back, reaching overhead and unable to perform repetitive tasks  Assessment details: Pt is a 61 YO F who presents to the clinic with R shoulder pain. Pt with signs and symptoms consistent with R RTC and biceps impingement. Pt would benefit from skilled PT for decreasing pain and improving R shoulder ROM and strength.     Barriers to therapy: chronicity, severity, co-morbidities  Prognosis: fair    Goals  Plan Goals: SHORT TERM GOALS:     4 weeks  1. Pt I w/ HEP  2. Pt to demonstrate AROM of the right shoulder to flexion 120 deg, abd 120 deg and ER to the back of the head to improve ability to perform ADL's  3. Pt to demonstrate ability to perform 30 minutes continuous activity in the clinic without increase in pain     LONG TERM GOALS:   8 weeks  1. Pt to demonstrate AROM of the right shoulder to WNL to allow ability to perform all necessary functional activities  2. Pt to demonstrate right shoulder MMT of 4-/5 to aid with daily demands.   3. Pt to report being able to work around her house without increase in pain in the shoulder  4. Pt to tolerate 60 minutes continuous activity in the clinic without increase in pain     Plan  Therapy options: will be seen for skilled therapy services  Planned modality interventions: cryotherapy and thermotherapy (hydrocollator packs)  Planned therapy interventions: abdominal trunk  stabilization, flexibility, functional ROM exercises, home exercise program, joint mobilization, manual therapy, motor coordination training, neuromuscular re-education, postural training, soft tissue mobilization, strengthening, stretching and therapeutic activities  Frequency: 2x week  Duration in weeks: 8  Treatment plan discussed with: patient         History # of Personal Factors and/or Comorbidities: LOW (0)  Examination of Body System(s): # of elements: LOW (1-2)  Clinical Presentation: STABLE   Clinical Decision Making: LOW       Timed:         Manual Therapy:         mins  92453;     Therapeutic Exercise:  15       mins  68816;     Neuromuscular Love:       mins  43910;    Therapeutic Activity:    10      mins  07548;     Gait Training:          mins  72185;     Ultrasound:          mins  75717;    Ionto                                   mins   32442  Self Care                            mins   98524  Canalith Repos         mins 56863      Un-Timed:  Electrical Stimulation:         mins  13692 ( );  Dry Needling          mins self-pay  Traction          mins 71970  Low Eval         Mins  13185  Mod Eval          Mins  59349  High Eval                            Mins  54088        Timed Treatment:   25   mins   Total Treatment:     55   mins      PT: Mleita Inman PT     License Number: [id number]    Electronically signed by Melita Inman PT, 05/11/23, 10:40 AM EDT    Certification Period: 5/11/2023 thru 8/8/2023  I certify that the therapy services are furnished while this patient is under my care.  The services outlined above are required by this patient, and will be reviewed every 90 days.         Physician Signature:__________________________________________________    PHYSICIAN: Ian Street PA-C  NPI: [id number]      DATE:     Please sign and return via fax to [email or web address] . Thank you, [facility].

## 2023-05-15 ENCOUNTER — TREATMENT (OUTPATIENT)
Dept: PHYSICAL THERAPY | Facility: CLINIC | Age: 63
End: 2023-05-15
Payer: MEDICARE

## 2023-05-15 DIAGNOSIS — M25.511 CHRONIC RIGHT SHOULDER PAIN: Primary | ICD-10-CM

## 2023-05-15 DIAGNOSIS — G89.29 CHRONIC RIGHT SHOULDER PAIN: Primary | ICD-10-CM

## 2023-05-15 PROCEDURE — 97112 NEUROMUSCULAR REEDUCATION: CPT

## 2023-05-15 PROCEDURE — 97140 MANUAL THERAPY 1/> REGIONS: CPT

## 2023-05-15 PROCEDURE — 97110 THERAPEUTIC EXERCISES: CPT

## 2023-05-15 NOTE — PROGRESS NOTES
Physical Therapy Daily Treatment Note  [address] [place] [address]      Patient: Melita Sepulveda   : 1960  Referring practitioner: ROB Chowdhury  Date of Initial Visit: Type: THERAPY  Noted: 2023  Today's Date: 5/15/2023  Patient seen for 2 sessions         Visit Diagnoses:    ICD-10-CM ICD-9-CM   1. Chronic right shoulder pain  M25.511 719.41    G89.29 338.29       Subjective   Patient reports her pain is about the same in the shoulder.  her pain level is 7/10 upon arrival. Exercises are good. Has been focusing on her posture. Pain mostly with reaching forward.      Objective   See Exercise, Manual, and Modality Logs for complete treatment.   Observation: significantly elevated and rounded shoulder posture    Assessment/Plan  Pt tolerated treatment without report of increased pain. Educated pt with use of verbal, tactile and visual cues regarding her elevated and rounded shoulder R>L.. Pt was able to see in the mirror her posture and was able to correct with cuing. Continue with current POT progressing pt per tolerance.    Timed:         Manual Therapy: 15     mins  70899;     Therapeutic Exercise:    18     mins  89690;     Neuromuscular Love: 8     mins  25191;    Therapeutic Activity:          mins  86045;     Gait Training:           mins  23759;     Ultrasound:          mins  07089;    Ionto                                   mins   93982  Self Care                            mins   06191  Canalith Repos         mins 38610      Un-Timed:  Electrical Stimulation:        mins  97045 ( );  Dry Needling          mins self-pay  Traction          mins 22714      Timed Treatment:   41  mins   Total Treatment:     41   mins    Melita Inman PT  [place] License: [id number]

## 2023-05-18 ENCOUNTER — TREATMENT (OUTPATIENT)
Dept: PHYSICAL THERAPY | Facility: CLINIC | Age: 63
End: 2023-05-18
Payer: MEDICARE

## 2023-05-18 DIAGNOSIS — M25.511 CHRONIC RIGHT SHOULDER PAIN: Primary | ICD-10-CM

## 2023-05-18 DIAGNOSIS — G89.29 CHRONIC RIGHT SHOULDER PAIN: Primary | ICD-10-CM

## 2023-05-18 NOTE — PROGRESS NOTES
Physical Therapy Daily Treatment Note  [address] [place] [address]      Patient: Melita Sepulveda   : 1960  Referring practitioner: ROB Chowdhury  Date of Initial Visit: Type: THERAPY  Noted: 2023  Today's Date: 2023  Patient seen for 3 sessions         Visit Diagnoses:    ICD-10-CM ICD-9-CM   1. Chronic right shoulder pain  M25.511 719.41    G89.29 338.29       Subjective   Patient reports the shoulder felt sore after last session. Feeling about the same, maybe slightly better.       Objective   See Exercise, Manual, and Modality Logs for complete treatment.       Assessment/Plan  Pt tolerated treatment including addition of isometric exercises without report of increased pain. Still requires verbal and tactile cuing for improving scapular positioning. Educated pt regarding continuing to work on her HEP to aid with postural and scapular improvements. Continue with current POT progressing pt per tolerance.     Timed:         Manual Therapy: 13        mins  60432;     Therapeutic Exercise:   27      mins  24404;     Neuromuscular Love:      mins  45026;    Therapeutic Activity:          mins  02432;     Gait Training:           mins  05653;     Ultrasound:          mins  06869;    Ionto                                   mins   17917  Self Care                            mins   75086  Canalith Repos         mins 78101      Un-Timed:  Electrical Stimulation:        mins  46812 ( );  Dry Needling          mins self-pay  Traction          mins 91809      Timed Treatment:  40   mins   Total Treatment:     40   mins    RIGO Barron License: [id number]

## 2023-05-22 ENCOUNTER — TREATMENT (OUTPATIENT)
Dept: PHYSICAL THERAPY | Facility: CLINIC | Age: 63
End: 2023-05-22
Payer: MEDICARE

## 2023-05-22 DIAGNOSIS — G89.29 CHRONIC RIGHT SHOULDER PAIN: Primary | ICD-10-CM

## 2023-05-22 DIAGNOSIS — M25.511 CHRONIC RIGHT SHOULDER PAIN: Primary | ICD-10-CM

## 2023-05-22 PROCEDURE — 97140 MANUAL THERAPY 1/> REGIONS: CPT

## 2023-05-22 PROCEDURE — 97110 THERAPEUTIC EXERCISES: CPT
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
## 2023-05-31 ENCOUNTER — TRANSCRIBE ORDERS (OUTPATIENT)
Dept: ADMINISTRATIVE | Facility: HOSPITAL | Age: 63
End: 2023-05-31

## 2023-05-31 DIAGNOSIS — M96.1 POSTLAMINECTOMY SYNDROME, LUMBAR REGION: Primary | ICD-10-CM

## 2023-05-31 DIAGNOSIS — M54.6 THORACIC SPINE PAIN: Primary | ICD-10-CM

## 2023-06-07 ENCOUNTER — OFFICE VISIT (OUTPATIENT)
Dept: UROLOGY | Facility: CLINIC | Age: 63
End: 2023-06-07
Payer: MEDICARE

## 2023-06-07 VITALS
DIASTOLIC BLOOD PRESSURE: 78 MMHG | SYSTOLIC BLOOD PRESSURE: 100 MMHG | HEART RATE: 84 BPM | BODY MASS INDEX: 22.53 KG/M2 | TEMPERATURE: 96.3 F | OXYGEN SATURATION: 98 % | HEIGHT: 64 IN | WEIGHT: 132 LBS

## 2023-06-07 DIAGNOSIS — R31.29 MICROSCOPIC HEMATURIA: Primary | ICD-10-CM

## 2023-06-07 DIAGNOSIS — N95.8 GENITOURINARY SYNDROME OF MENOPAUSE: ICD-10-CM

## 2023-06-07 LAB
BILIRUB BLD-MCNC: NEGATIVE MG/DL
CLARITY, POC: ABNORMAL
COLOR UR: ABNORMAL
EXPIRATION DATE: ABNORMAL
GLUCOSE UR STRIP-MCNC: NEGATIVE MG/DL
KETONES UR QL: NEGATIVE
LEUKOCYTE EST, POC: NEGATIVE
Lab: ABNORMAL
NITRITE UR-MCNC: NEGATIVE MG/ML
PH UR: 5 [PH] (ref 5–8)
PROT UR STRIP-MCNC: NEGATIVE MG/DL
RBC # UR STRIP: ABNORMAL /UL
SP GR UR: 1.03 (ref 1–1.03)
UROBILINOGEN UR QL: NORMAL

## 2023-06-07 RX ORDER — DILTIAZEM HYDROCHLORIDE 60 MG/1
TABLET, FILM COATED ORAL
COMMUNITY
Start: 2023-05-26

## 2023-06-07 RX ORDER — ESTRADIOL 0.1 MG/G
CREAM VAGINAL
Qty: 42.5 G | Refills: 3 | Status: SHIPPED | OUTPATIENT
Start: 2023-06-07

## 2023-06-07 RX ORDER — CITALOPRAM 20 MG/1
30 TABLET ORAL
COMMUNITY
Start: 2022-12-28

## 2023-06-07 NOTE — PROGRESS NOTES
Office Visit General Established Female Patient     Patient Name: Melita Sepulveda  : 1960   MRN: [id number]     Chief Complaint:   Chief Complaint   Patient presents with    Follow-up    Blood in Urine     microscopic       Referring Provider: No ref. provider found    History of Present Illness: Melita Sepulveda is a 63 y.o. female with history below in assessment, who presents for follow up.   Patient reports as long as she is using Estrace she does not have problems but she can tell a difference when she forgets to use the Estrace.    Subjective      Review of System:   As noted in HPI     Past Medical History:   Past Medical History:   Diagnosis Date    Anxiety and depression     Arrhythmia     Arthritis     Shoulders    ASCUS with positive high risk human papillomavirus of vagina 2017    Back pain     CHF (congestive heart failure)     COPD (chronic obstructive pulmonary disease)     Diverticulitis     Dizziness     Eczema     bilat ring finger, bilat ears     Endometriosis     GERD (gastroesophageal reflux disease)     h/o    Headache     IBS (irritable bowel syndrome)     ICD (implantable cardioverter-defibrillator) in place     home monitoring     Insomnia     Long Q-T syndrome     Myocardial infarction     Scoliosis     Seizure     - with med reaction to phenergan- per pt    Tinnitus     Wears dentures     upper only     Wears glasses     reading       Past Surgical History:   Past Surgical History:   Procedure Laterality Date    APPENDECTOMY      BLADDER SUSPENSION      [place] - done via laparotomy    CARDIAC DEFIBRILLATOR PLACEMENT      CARDIAC ELECTROPHYSIOLOGY PROCEDURE N/A 2016    Procedure: replace old icd, likely extract both old leads with OR back-up;  Surgeon: Robert Queen MD;  Location: [facility] INVASIVE LOCATION;  Service:     COLONOSCOPY          COLONOSCOPY N/A 2019    Procedure: COLONOSCOPY;  Surgeon: Marv Ramos  MD SHYANNE;  Location:  [facility];  Service: Gastroenterology    COLONOSCOPY N/A 12/13/2019    Procedure: COLONOSCOPY;  Surgeon: Marv Ramos MD;  Location:  [facility];  Service: Gastroenterology    ENDOSCOPY      HYSTERECTOMY  1989    Via exploratory laparotomy (transverse incision) along with incidental appendectomy.  Done for endometriosis    LUMBAR FUSION  2015    MENISCECTOMY Right 1993    OOPHORECTOMY Right 1986    Done via vertical midline incision - stil have left ovary     TONSILLECTOMY AND ADENOIDECTOMY  1973       Family History:   Family History   Problem Relation Age of Onset    Sleep apnea Mother     Arthritis Mother     Hypertension Mother     Thyroid disease Mother     Parkinsonism Mother     Hypertension Father     Diabetes Sister     Colon cancer Sister 50    Hypertension Brother     Diabetes Maternal Grandmother     Breast cancer Neg Hx     Ovarian cancer Neg Hx        Social History:   Social History     Socioeconomic History    Marital status:    Tobacco Use    Smoking status: Every Day     Packs/day: 0.50     Years: 40.00     Pack years: 20.00     Types: Cigarettes     Start date: 1975    Smokeless tobacco: Never    Tobacco comments:     now smoking 1/2 pack per day- trying to quit now    Vaping Use    Vaping Use: Never used   Substance and Sexual Activity    Alcohol use: Yes     Comment: once a year    Drug use: Yes     Types: Marijuana     Comment: gummies 1-2 times a week    Sexual activity: Defer     Birth control/protection: Surgical     Comment: Hysterectomy       Medications:     Current Outpatient Medications:     albuterol (VENTOLIN HFA) 108 (90 BASE) MCG/ACT inhaler, Inhale 2 puffs Every 4 (Four) Hours As Needed for Wheezing., Disp: 1 inhaler, Rfl: 2    atorvastatin (LIPITOR) 20 MG tablet, , Disp: , Rfl:     budesonide (Pulmicort) 0.5 MG/2ML nebulizer solution, Take 2 mL by nebulization Daily., Disp: 60 mL, Rfl: 11    buprenorphine-naloxone (SUBOXONE) 8-2 MG per  "SL tablet, , Disp: , Rfl:     citalopram (CeleXA) 10 MG tablet, Take 2 tablets by mouth Daily., Disp: , Rfl:     citalopram (CeleXA) 20 MG tablet, 1.5 tablets., Disp: , Rfl:     estradiol (ESTRACE) 0.1 MG/GM vaginal cream, Use externally as directed, Disp: 42.5 g, Rfl: 3    ipratropium (ATROVENT) 0.06 % nasal spray, INSTILL 2 SPRAYS IN EACH NOSTRIL FOUR TIMES DAILY AS DIRECTED, Disp: 15 mL, Rfl: 1    ipratropium-albuterol (DUO-NEB) 0.5-2.5 mg/3 ml nebulizer, Take 2.5 mg by nebulization 2 (Two) Times a Day., Disp: , Rfl:     metoprolol succinate XL (TOPROL-XL) 25 MG 24 hr tablet, Take 1 tablet by mouth Daily., Disp: , Rfl:     Naloxegol Oxalate (Movantik) 25 MG tablet, Take 1 tablet by mouth., Disp: , Rfl:     ondansetron ODT (ZOFRAN-ODT) 4 MG disintegrating tablet, , Disp: , Rfl:     pantoprazole (PROTONIX) 40 MG EC tablet, Take 1 tablet by mouth Daily., Disp: , Rfl:     potassium chloride (K-DUR,KLOR-CON) 20 MEQ CR tablet, Take 1 tablet by mouth Daily., Disp: 90 tablet, Rfl: 0    Symbicort 80-4.5 MCG/ACT inhaler, , Disp: , Rfl:     tiZANidine (ZANAFLEX) 4 MG tablet, Take 1 tablet by mouth Every 6 (Six) Hours As Needed for Muscle Spasms., Disp: , Rfl:     traZODone (DESYREL) 150 MG tablet, , Disp: , Rfl:     Diclofenac Sodium (VOLTAREN) 1 % gel gel, , Disp: , Rfl:     Allergies:   Allergies   Allergen Reactions    Levaquin [Levofloxacin] Hives    Phenergan [Promethazine Hcl] Other (See Comments)     seizure    Promethazine Other (See Comments) and Unknown - High Severity     seizure    Vancomycin Other (See Comments)     \"ed\" syndrome    Doxycycline GI Intolerance    Morphine And Related Nausea And Vomiting    Adhesive Tape Rash    Dicyclomine Other (See Comments)    Flonase [Fluticasone] Other (See Comments)     \"dries my nose out really bad\"    Sulfa Antibiotics Rash    Wound Dressing Adhesive Unknown - High Severity       Objective     Physical Exam:   Vital Signs:   Visit Vitals  /78 (BP Location: Left " "arm, Patient Position: Sitting)   Pulse 84   Temp 96.3 °F (35.7 °C) (Temporal)   Ht 162.6 cm (64\")   Wt 59.9 kg (132 lb)   LMP 09/12/1989 (LMP Unknown)   SpO2 98%   BMI 22.66 kg/m²      Body mass index is 22.66 kg/m².     Constitutional: NAD, WDWN.   Neurological: A + O x 3   Psychiatric:  Normal mood and affect    Labs  Brief Urine Lab Results  (Last result in the past 365 days)        Color   Clarity   Blood   Leuk Est   Nitrite   Protein   CREAT   Urine HCG        06/07/23 1022 Dark Yellow   Slightly Cloudy   1+   Negative   Negative   Negative                   Lab Results   Component Value Date    GLUCOSE 109 (H) 10/19/2022    CALCIUM 8.8 10/19/2022     10/19/2022    K 3.0 (L) 10/19/2022    CO2 25.9 10/19/2022     10/19/2022    BUN 17 10/19/2022    CREATININE 0.86 10/19/2022    EGFRIFAFRI 98 08/02/2022    EGFRIFNONA 87 01/03/2017    BCR 19.8 10/19/2022    ANIONGAP 9.1 10/19/2022       Lab Results   Component Value Date    WBC 7.92 10/19/2022    HGB 12.4 10/19/2022    HCT 35.2 10/19/2022    MCV 97.2 (H) 10/19/2022     10/19/2022             I have reviewed the above labs.     Assessment / Plan      Assessment/Plan:   Diagnoses and all orders for this visit:    1. Microscopic hematuria (Primary)  -     POC Urinalysis Dipstick, Automated    2. Genitourinary syndrome of menopause  -     estradiol (ESTRACE) 0.1 MG/GM vaginal cream; Use externally as directed  Dispense: 42.5 g; Refill: 3    63-year-old female here to follow-up with microscopic hematuria and GSM had cystoscopy with Dr. Lynn approximately 6 months ago results were normal bladder mucosa and no tumors, masses or stones, positive GSM.  UA today benign with 1+ blood, patient is doing well managing with estradiol cream.  We will continue with treatment plan and have patient follow-up in 1 year or sooner if new or worsening symptoms    Continue topical estradiol cream 3 nights weekly and use coconut oil, Aquaphor or Vaseline on nights not " using estradiol cream.    Follow Up:   Return in about 1 year (around 6/7/2024) for Follow up ARCENIO Mcallister, NP-C  [facility] [place]

## 2023-06-08 ENCOUNTER — HOSPITAL ENCOUNTER (OUTPATIENT)
Dept: CT IMAGING | Facility: HOSPITAL | Age: 63
Discharge: HOME | End: 2023-06-08
Payer: MEDICARE

## 2023-06-08 DIAGNOSIS — M54.6 THORACIC SPINE PAIN: ICD-10-CM

## 2023-06-08 DIAGNOSIS — M96.1 POSTLAMINECTOMY SYNDROME, LUMBAR REGION: ICD-10-CM

## 2023-06-08 PROCEDURE — 72131 CT LUMBAR SPINE W/O DYE: CPT

## 2023-06-08 PROCEDURE — 72128 CT CHEST SPINE W/O DYE: CPT

## 2023-06-16 ENCOUNTER — TRANSCRIBE ORDERS (OUTPATIENT)
Dept: PHYSICAL THERAPY | Facility: CLINIC | Age: 63
End: 2023-06-16
Payer: MEDICARE
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
## 2023-06-16 DIAGNOSIS — S39.012A STRAIN OF LUMBAR REGION, INITIAL ENCOUNTER: Primary | ICD-10-CM

## 2023-06-16 DIAGNOSIS — S46.911A STRAIN OF RIGHT SHOULDER, INITIAL ENCOUNTER: ICD-10-CM

## 2023-08-25 ENCOUNTER — TREATMENT (OUTPATIENT)
Dept: PHYSICAL THERAPY | Facility: CLINIC | Age: 63
End: 2023-08-25
Payer: COMMERCIAL

## 2023-08-25 DIAGNOSIS — M54.50 ACUTE RIGHT-SIDED LOW BACK PAIN WITHOUT SCIATICA: Primary | ICD-10-CM

## 2023-08-25 NOTE — PROGRESS NOTES
Physical Therapy Daily Treatment Note  [address] [place] [address]      Patient: Melita Sepulveda   : 1960  Referring practitioner: ELIZABET Chandler*  Date of Initial Visit: Type: THERAPY  Noted: 2023  Today's Date: 2023  Patient seen for 8 sessions         Visit Diagnoses:    ICD-10-CM ICD-9-CM   1. Acute right-sided low back pain without sciatica  M54.50 724.2       Subjective   Patient reports 6-7/10 pain in the back and right hip. Notes she is not able to work around the house without an increase in pain. Still getting numbness and tingling down the R leg.       Objective   See Exercise, Manual, and Modality Logs for complete treatment.   Lumbar flexion 20 deg  B LE MMT: hip flexion 4-/5, abd 3+/5 pain    Assessment/Plan  Pt has met STG#1,2,3 for hip flexion. Remaining goals are still appropriate but pt may have difficulty meeting due to her chronic history of back pain and surgeries. Educated pt to continue to work on her core strength and lumbar mobility with use of HEP. Continue with current POT progressing pt per tolerance.      Timed:         Manual Therapy:  12       mins  00155;     Therapeutic Exercise:   26      mins  58470;     Neuromuscular Love:      mins  21549;    Therapeutic Activity:          mins  81318;     Gait Training:           mins  19780;     Ultrasound:          mins  80799;    Ionto                                   mins   38359  Self Care                            mins   72625  Canalith Repos         mins 98735      Un-Timed:  Electrical Stimulation:        mins  39006 ( );  Dry Needling          mins self-pay  Traction          mins 12130      Timed Treatment:   38   mins   Total Treatment:     41   mins    Melita Inman PT  [place] License: [id number]

## 2023-08-29 ENCOUNTER — TREATMENT (OUTPATIENT)
Dept: PHYSICAL THERAPY | Facility: CLINIC | Age: 63
End: 2023-08-29
Payer: COMMERCIAL

## 2023-08-29 DIAGNOSIS — M54.50 ACUTE RIGHT-SIDED LOW BACK PAIN WITHOUT SCIATICA: Primary | ICD-10-CM

## 2023-08-30 NOTE — PROGRESS NOTES
Physical Therapy Daily Treatment Note  [address] [place] [address]      Patient: Melita Sepulveda   : 1960  Referring practitioner: ELIZABET Chandler*  Date of Initial Visit: Type: THERAPY  Noted: 2023  Today's Date: 2023  Patient seen for 9 sessions         Visit Diagnoses:    ICD-10-CM ICD-9-CM   1. Acute right-sided low back pain without sciatica  M54.50 724.2       Subjective   Patient reports that her back has been about the same. Saw the MD and he wants her to get massages.     Objective   See Exercise, Manual, and Modality Logs for complete treatment.   Observation: Pt ambulates without cane but with forward flexed trunk    Assessment/Plan  Pt tolerated treatment without report of increased pain and with report of improved symptoms with lumbar\hip distraction. Discussed importance of upright trunk during gait and return to use of cane if she is limping or having difficulty to aid with decreased demand on back muscles. Also discussed importance of core strengthening to aid with decreased demand on back muscles. Continue with current POT progressing pt per tolerance.    Timed:         Manual Therapy:   28      mins  16496;     Therapeutic Exercise:  15       mins  56772;     Neuromuscular Love:      mins  21347;    Therapeutic Activity:          mins  23727;     Gait Training:           mins  27165;     Ultrasound:          mins  55959;    Ionto                                   mins   74415  Self Care                            mins   80253  Canalith Repos         mins 04763      Un-Timed:  Electrical Stimulation:        mins  75101 ( );  Dry Needling          mins self-pay  Traction          mins 43009      Timed Treatment:   43   mins   Total Treatment:     43   mins    Melita Inman PT  [place] License: [id number]

## 2023-08-31 ENCOUNTER — TREATMENT (OUTPATIENT)
Dept: PHYSICAL THERAPY | Facility: CLINIC | Age: 63
End: 2023-08-31
Payer: COMMERCIAL

## 2023-08-31 DIAGNOSIS — M54.50 ACUTE RIGHT-SIDED LOW BACK PAIN WITHOUT SCIATICA: Primary | ICD-10-CM

## 2023-09-03 NOTE — PROGRESS NOTES
Physical Therapy Daily Treatment Note  [address] [place] [address]      Patient: Melita Sepulveda   : 1960  Referring practitioner: ELIZABET Chandler*  Date of Initial Visit: Type: THERAPY  Noted: 2023  Today's Date: 9/3/2023  Patient seen for 10 sessions         Visit Diagnoses:    ICD-10-CM ICD-9-CM   1. Acute right-sided low back pain without sciatica  M54.50 724.2       Subjective   Patient reports she had to lay in a DEXA scanner and it really made her back hurt. Doing the exercises regularly    Objective   See Exercise, Manual, and Modality Logs for complete treatment.   Observation: forward flexed posture and antalgic gait with no AD    Assessment/Plan  Pt tolerated treatment with report of improved muscle pain at the end of treatment. Educated pt regarding continuing to work on her core activation and to help build muscle to aid with pressure against her bones. Discussed continued performance of HEP. Continue with current POT progressing pt per tolerance.    Timed:         Manual Therapy: 20        mins  70740;     Therapeutic Exercise:  24       mins  65557;     Neuromuscular Love:  10    mins  33282;    Therapeutic Activity:          mins  16757;     Gait Training:           mins  46093;     Ultrasound:          mins  65541;    Ionto                                   mins   43940  Self Care                            mins   05225  Canalith Repos         mins 39854      Un-Timed:  Electrical Stimulation:        mins  90291 ( );  Dry Needling          mins self-pay  Traction          mins 99556      Timed Treatment:   54   mins   Total Treatment:     54   mins    Melita Inman, PT  [place] License: [id number]

## 2023-09-05 ENCOUNTER — TELEPHONE (OUTPATIENT)
Dept: PHYSICAL THERAPY | Facility: CLINIC | Age: 63
End: 2023-09-05

## 2023-09-05 NOTE — TELEPHONE ENCOUNTER
Caller: Melita Sepulveda    Relationship: Self         What was the call regarding: NOT FEELING WELL

## 2023-09-06 ENCOUNTER — TREATMENT (OUTPATIENT)
Dept: PHYSICAL THERAPY | Facility: CLINIC | Age: 63
End: 2023-09-06
Payer: COMMERCIAL

## 2023-09-06 DIAGNOSIS — M54.50 ACUTE RIGHT-SIDED LOW BACK PAIN WITHOUT SCIATICA: Primary | ICD-10-CM

## 2023-09-09 NOTE — PROGRESS NOTES
Physical Therapy Daily Treatment Note  [address] [place] [address]      Patient: Melita Sepulveda   : 1960  Referring practitioner: ELIZABET Chandler*  Date of Initial Visit: Type: THERAPY  Noted: 2023  Today's Date: 2023  Patient seen for 11 sessions         Visit Diagnoses:    ICD-10-CM ICD-9-CM   1. Acute right-sided low back pain without sciatica  M54.50 724.2       Subjective   Patient reports that the back is doing ok. Still gives her pain sometimes but feels she is ready to use her home exercises to help with her pain.    Objective   See Exercise, Manual, and Modality Logs for complete treatment.       Assessment/Plan  Pt tolerated treatment without report of increased pain. [facility] and the benefits of using the pool. Place pt's chart on hold for 1 month to allow for assessment of rfficacy of HEP. If pt does not call for an appt DC chart at that time.    Timed:         Manual Therapy:  14       mins  61816;     Therapeutic Exercise:   24      mins  54937;     Neuromuscular Love:      mins  73371;    Therapeutic Activity:          mins  12413;     Gait Training:           mins  38013;     Ultrasound:          mins  17534;    Ionto                                   mins   55715  Self Care                            mins   51873  Canalith Repos         mins 18795      Un-Timed:  Electrical Stimulation:        mins  18892 ( );  Dry Needling          mins self-pay  Traction          mins 04615      Timed Treatment:   38   mins   Total Treatment:     38   mins    Melita Inman PT  [place] License: [id number]

## 2023-09-11 ENCOUNTER — OFFICE VISIT (OUTPATIENT)
Dept: GASTROENTEROLOGY | Facility: CLINIC | Age: 63
End: 2023-09-11
Payer: MEDICARE

## 2023-09-11 VITALS
DIASTOLIC BLOOD PRESSURE: 54 MMHG | WEIGHT: 137 LBS | BODY MASS INDEX: 23.39 KG/M2 | HEIGHT: 64 IN | HEART RATE: 71 BPM | RESPIRATION RATE: 14 BRPM | OXYGEN SATURATION: 94 % | TEMPERATURE: 97 F | SYSTOLIC BLOOD PRESSURE: 100 MMHG

## 2023-09-11 DIAGNOSIS — R13.14 PHARYNGOESOPHAGEAL DYSPHAGIA: Chronic | ICD-10-CM

## 2023-09-11 DIAGNOSIS — K21.9 GASTROESOPHAGEAL REFLUX DISEASE, UNSPECIFIED WHETHER ESOPHAGITIS PRESENT: Chronic | ICD-10-CM

## 2023-09-11 DIAGNOSIS — Z12.11 ENCOUNTER FOR SCREENING FOR MALIGNANT NEOPLASM OF COLON: ICD-10-CM

## 2023-09-11 DIAGNOSIS — K62.5 RECTAL BLEEDING: Chronic | ICD-10-CM

## 2023-09-11 DIAGNOSIS — K59.00 CONSTIPATION, UNSPECIFIED CONSTIPATION TYPE: Chronic | ICD-10-CM

## 2023-09-11 DIAGNOSIS — R10.13 EPIGASTRIC PAIN: Chronic | ICD-10-CM

## 2023-09-11 DIAGNOSIS — R11.0 NAUSEA: Primary | Chronic | ICD-10-CM

## 2023-09-11 DIAGNOSIS — Z80.0 FAMILY HISTORY OF COLON CANCER: ICD-10-CM

## 2023-09-11 PROBLEM — K59.03 DRUG-INDUCED CONSTIPATION: Chronic | Status: ACTIVE | Noted: 2023-09-11

## 2023-09-11 RX ORDER — SODIUM CHLORIDE 9 MG/ML
70 INJECTION, SOLUTION INTRAVENOUS CONTINUOUS PRN
OUTPATIENT
Start: 2023-09-11

## 2023-09-11 RX ORDER — SODIUM, POTASSIUM,MAG SULFATES 17.5-3.13G
SOLUTION, RECONSTITUTED, ORAL ORAL
Qty: 177 ML | Refills: 0 | Status: SHIPPED | OUTPATIENT
Start: 2023-09-11

## 2023-09-11 RX ORDER — BISACODYL 5 MG/1
TABLET, DELAYED RELEASE ORAL
Qty: 4 TABLET | Refills: 0 | Status: SHIPPED | OUTPATIENT
Start: 2023-09-11

## 2023-09-11 NOTE — PATIENT INSTRUCTIONS
Antireflux measures: Avoid fried, fatty foods, alcohol, chocolate, coffee, tea,  soft drinks, all carbonated beverages (including sparkling water), peppermint and spearmint, spicy foods, tomatoes and tomato based foods, onions, peppers, and garlic.   Other antireflux measures include weight reduction if overweight, avoiding tight clothing around the abdomen, elevating the head of the bed 6 inches with blocks under the head board, and don't drink or eat before going to bed and avoid lying down immediately after meals.  Avoid vaping/smoking/marijuana/marijuana THC gummies/CBD/etc.  Pantoprazole 40 mg 1 by mouth in the am 30 minutes before breakfast.  Zofran 4 mg 1 po every 8 hours as needed for nausea.  The patient should eat 4-5 very small meals throughout the day. Avoid large meals.  It is recommended to eat a softer diet. Meats are best consumed ground. Fruits and vegetables are best consumed cooked or steamed and then mashed.   Low fiber, low fat diet with liberal water intake. May use soluble fiber such as Metamucil daily.   Advised to chew food very well and take sips of water between bites.  Continue Movantik 25 mg every day.   May take Miralax 17 grams daily.  May take stool softeners 2 per day as needed.  Metamucil 1 packet daily.  Low FODMAP diet - avoid all dairy. May use lactose free/dairy free alternatives such as almond milk, rice milk, oat milk, etc.   Upper endoscopy-EGD: The indications, technique, alternatives and potential risk and complications were discussed with the patient including but not limited to bleeding, perforations, missing lesions and anesthetic complications. The patient understands and wishes to proceed with the procedure and has given their verbal consent. Written patient education information was given to the patient.   The patient will call if they have further questions before procedure.   Colonoscopy: The indications, technique, alternatives and potential risk and complications  were discussed with the patient including but not limited to bleeding, perforations, missing lesions and anesthetic complications. The patient understands and wishes to proceed with the procedure and has given their verbal consent. Written patient education information was given to the patient.   The patient will call if they have further questions before procedure.     Low-FODMAP Eating Plan    FODMAP stands for fermentable oligosaccharides, disaccharides, monosaccharides, and polyols. These are sugars that are hard for some people to digest. A low-FODMAP eating plan may help some people who have irritable bowel syndrome (IBS) and certain other bowel (intestinal) diseases to manage their symptoms.  This meal plan can be complicated to follow. Work with a diet and nutrition specialist (dietitian) to make a low-FODMAP eating plan that is right for you. A dietitian can help make sure that you get enough nutrition from this diet.  What are tips for following this plan?  Reading food labels  Check labels for hidden FODMAPs such as:  High-fructose syrup.  Honey.  Agave.  Natural fruit flavors.  Onion or garlic powder.  Choose low-FODMAP foods that contain 3-4 grams of fiber per serving.  Check food labels for serving sizes. Eat only one serving at a time to make sure FODMAP levels stay low.  Shopping  Shop with a list of foods that are recommended on this diet and make a meal plan.  Meal planning  Follow a low-FODMAP eating plan for up to 6 weeks, or as told by your health care provider or dietitian.  To follow the eating plan:  Eliminate high-FODMAP foods from your diet completely. Choose only low-FODMAP foods to eat. You will do this for 2-6 weeks.  Gradually reintroduce high-FODMAP foods into your diet one at a time. Most people should wait a few days before introducing the next new high-FODMAP food into their meal plan. Your dietitian can recommend how quickly you may reintroduce foods.  Keep a daily record of what  "and how much you eat and drink. Make note of any symptoms that you have after eating.  Review your daily record with a dietitian regularly to identify which foods you can eat and which foods you should avoid.  General tips  Drink enough fluid each day to keep your urine pale yellow.  Avoid processed foods. These often have added sugar and may be high in FODMAPs.  Avoid most dairy products, whole grains, and sweeteners.  Work with a dietitian to make sure you get enough fiber in your diet.  Avoid high FODMAP foods at meals to manage symptoms.    Recommended foods  Fruits  Bananas, oranges, tangerines, yovany, limes, blueberries, raspberries, strawberries, grapes, cantaloupe, honeydew melon, kiwi, papaya, passion fruit, and pineapple. Limited amounts of dried cranberries, banana chips, and shredded coconut.  Vegetables  Eggplant, zucchini, cucumber, peppers, green beans, bean sprouts, lettuce, arugula, kale, Swiss chard, spinach, nataliya greens, bok chante, summer squash, potato, and tomato. Limited amounts of corn, carrot, and sweet potato. Green parts of scallions.  Grains  Gluten-free grains, such as rice, oats, buckwheat, quinoa, corn, polenta, and millet. Gluten-free pasta, bread, or cereal. Rice noodles. Corn tortillas.  Meats and other proteins  Unseasoned beef, pork, poultry, or fish. Eggs. Wheeler. Tofu (firm) and tempeh. Limited amounts of nuts and seeds, such as almonds, walnuts, brazil nuts, pecans, peanuts, nut butters, pumpkin seeds, rosa maria seeds, and sunflower seeds.  Dairy  Lactose-free milk, yogurt, and kefir. Lactose-free cottage cheese and ice cream. Non-dairy milks, such as almond, coconut, hemp, and rice milk. Non-dairy yogurt. Limited amounts of goat cheese, brie, mozzarella, parmesan, swiss, and other hard cheeses.  Fats and oils  Butter-free spreads. Vegetable oils, such as olive, canola, and sunflower oil.  Seasoning and other foods  Artificial sweeteners with names that do not end in \"ol,\" such as " aspartame, saccharine, and stevia. Maple syrup, white table sugar, raw sugar, brown sugar, and molasses. Mayonnaise, soy sauce, and tamari. Fresh basil, coriander, parsley, rosemary, and thyme.  Beverages  Water and mineral water. Sugar-sweetened soft drinks. Small amounts of orange juice or cranberry juice. Black and green tea. Most dry luke. Coffee.  The items listed above may not be a complete list of foods and beverages you can eat. Contact a dietitian for more information.    Foods to avoid  Fruits  Fresh, dried, and juiced forms of apple, pear, watermelon, peach, plum, cherries, apricots, blackberries, boysenberries, figs, nectarines, and raegan. Avocado.  Vegetables  Chicory root, artichoke, asparagus, cabbage, snow peas, [place] sprouts, broccoli, sugar snap peas, mushrooms, celery, and cauliflower. Onions, garlic, leeks, and the white part of scallions.  Grains  Wheat, including kamut, durum, and semolina. Barley and bulgur. Couscous. Wheat-based cereals. Wheat noodles, bread, crackers, and pastries.  Meats and other proteins  Fried or fatty meat. Sausage. Cashews and pistachios. Soybeans, baked beans, black beans, chickpeas, kidney beans, robbi beans, navy beans, lentils, black-eyed peas, and split peas.  Dairy  Milk, yogurt, ice cream, and soft cheese. Cream and sour cream. Milk-based sauces. Custard. Buttermilk. Soy milk.  Seasoning and other foods  Any sugar-free gum or candy. Foods that contain artificial sweeteners such as sorbitol, mannitol, isomalt, or xylitol. Foods that contain honey, high-fructose corn syrup, or agave. Bouillon, vegetable stock, beef stock, and chicken stock. Garlic and onion powder. Condiments made with onion, such as hummus, chutney, pickles, relish, salad dressing, and salsa. Tomato paste.  Beverages  Chicory-based drinks. Coffee substitutes. Chamomile tea. Fennel tea. Sweet or fortified luke such as port or kristi. Diet soft drinks made with isomalt, mannitol, maltitol,  sorbitol, or xylitol. Apple, pear, and raegan juice. Juices with high-fructose corn syrup.  The items listed above may not be a complete list of foods and beverages you should avoid. Contact a dietitian for more information.    Summary  FODMAP stands for fermentable oligosaccharides, disaccharides, monosaccharides, and polyols. These are sugars that are hard for some people to digest.  A low-FODMAP eating plan is a short-term diet that helps to ease symptoms of certain bowel diseases.  The eating plan usually lasts up to 6 weeks. After that, high-FODMAP foods are reintroduced gradually and one at a time. This can help you find out which foods may be causing symptoms.  A low-FODMAP eating plan can be complicated. It is best to work with a dietitian who has experience with this type of plan.  This information is not intended to replace advice given to you by your health care provider. Make sure you discuss any questions you have with your health care provider.  Document Revised: 05/06/2021 Document Reviewed: 05/06/2021  [employer] Patient Education © 2023 [employer]
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
## 2023-10-09 ENCOUNTER — OFFICE VISIT (OUTPATIENT)
Dept: SLEEP MEDICINE | Facility: HOSPITAL | Age: 63
End: 2023-10-09
Payer: MEDICARE

## 2023-10-09 VITALS
HEIGHT: 64 IN | OXYGEN SATURATION: 93 % | SYSTOLIC BLOOD PRESSURE: 107 MMHG | DIASTOLIC BLOOD PRESSURE: 76 MMHG | BODY MASS INDEX: 22.33 KG/M2 | HEART RATE: 88 BPM | WEIGHT: 130.8 LBS

## 2023-10-09 DIAGNOSIS — R06.83 SNORING: ICD-10-CM

## 2023-10-09 DIAGNOSIS — G47.33 OBSTRUCTIVE SLEEP APNEA, ADULT: Primary | ICD-10-CM

## 2023-10-23 ENCOUNTER — HOSPITAL ENCOUNTER (OUTPATIENT)
Dept: SLEEP MEDICINE | Facility: HOSPITAL | Age: 63
Discharge: HOME | End: 2023-10-23
Admitting: NURSE PRACTITIONER
Payer: MEDICARE

## 2023-10-23 VITALS — WEIGHT: 130 LBS | HEIGHT: 64 IN | BODY MASS INDEX: 22.2 KG/M2

## 2023-10-23 DIAGNOSIS — G47.33 OBSTRUCTIVE SLEEP APNEA, ADULT: ICD-10-CM

## 2023-10-23 DIAGNOSIS — R06.83 SNORING: ICD-10-CM

## 2023-10-23 PROCEDURE — 95800 SLP STDY UNATTENDED: CPT

## 2023-10-25 DIAGNOSIS — G47.33 OSA (OBSTRUCTIVE SLEEP APNEA): Primary | ICD-10-CM

## 2023-10-25 NOTE — PROGRESS NOTES
ADVISED PATIENT OF STUDY RESULTS VIA [employer]. AWAITING RESPONSE ON DECISION FOR CATHERINE TREATMENT.

## 2023-10-27 PROBLEM — Z12.11 ENCOUNTER FOR SCREENING FOR MALIGNANT NEOPLASM OF COLON: Status: ACTIVE | Noted: 2023-09-11

## 2023-10-27 NOTE — PRE-PROCEDURE INSTRUCTIONS
PAT phone history completed with patient for upcoming procedure on 10/30/23 and 11/13/23 with Dr. Gasca.    PAT PASS reviewed with patient and they verbalize understanding of the following:     Do not eat or drink anything after midnight the night before procedure unless otherwise instructed by physician/surgeon's office, this includes no gum, candy, mints, tobacco products or e-cigarettes.  Do not shave the area to be operated on at least 48 hours prior to procedure.  Do not wear makeup, lotion, hair products, or nail polish.  Do not wear any jewelry and remove all piercings.  Do not wear any adhesive if you wear dentures.  Do not wear contacts; bring in glasses if needed.  Only take medications on the morning of procedure as instructed by PAT nurse per anesthesia guidelines or as instructed by physician's office.  If you are on any blood thinners reach out to the physician/surgeon's office for instructions on when/if they will need to be stopped prior to procedure.  Bring in picture ID and insurance card, advanced directive copies if applicable, CPAP/BIPAP/Inhalers if indicated morning of procedure, leave any other valuables at home.  Ensure you have arranged for someone to drive you home the day of your procedure and someone to care for you at home afterwards. It is recommended that you do not drive, drink alcohol, or make any major legal decisions for at least 24 hours after your procedure is complete.    Instructions given on hospital entrance and registration location.

## 2023-10-30 ENCOUNTER — HOSPITAL ENCOUNTER (OUTPATIENT)
Facility: HOSPITAL | Age: 63
Setting detail: HOSPITAL OUTPATIENT SURGERY
Discharge: HOME | End: 2023-10-30
Attending: INTERNAL MEDICINE | Admitting: INTERNAL MEDICINE
Payer: MEDICARE

## 2023-10-30 ENCOUNTER — ANESTHESIA (OUTPATIENT)
Dept: GASTROENTEROLOGY | Facility: HOSPITAL | Age: 63
End: 2023-10-30
Payer: MEDICARE

## 2023-10-30 ENCOUNTER — ANESTHESIA EVENT (OUTPATIENT)
Dept: GASTROENTEROLOGY | Facility: HOSPITAL | Age: 63
End: 2023-10-30
Payer: MEDICARE

## 2023-10-30 VITALS
DIASTOLIC BLOOD PRESSURE: 77 MMHG | TEMPERATURE: 97 F | SYSTOLIC BLOOD PRESSURE: 107 MMHG | HEART RATE: 84 BPM | RESPIRATION RATE: 16 BRPM | OXYGEN SATURATION: 97 %

## 2023-10-30 DIAGNOSIS — R11.0 NAUSEA: ICD-10-CM

## 2023-10-30 DIAGNOSIS — R13.14 PHARYNGOESOPHAGEAL DYSPHAGIA: ICD-10-CM

## 2023-10-30 PROCEDURE — 25010000002 PROPOFOL 10 MG/ML EMULSION: Performed by: NURSE ANESTHETIST, CERTIFIED REGISTERED

## 2023-10-30 PROCEDURE — 25810000003 SODIUM CHLORIDE 0.9 % SOLUTION: Performed by: NURSE PRACTITIONER

## 2023-10-30 RX ORDER — PROPOFOL 10 MG/ML
VIAL (ML) INTRAVENOUS AS NEEDED
Status: DISCONTINUED | OUTPATIENT
Start: 2023-10-30 | End: 2023-10-30 | Stop reason: SURG

## 2023-10-30 RX ORDER — SODIUM CHLORIDE 9 MG/ML
70 INJECTION, SOLUTION INTRAVENOUS CONTINUOUS PRN
Status: DISCONTINUED | OUTPATIENT
Start: 2023-10-30 | End: 2023-10-30 | Stop reason: HOSPADM

## 2023-10-30 RX ORDER — LIDOCAINE HCL/PF 100 MG/5ML
SYRINGE (ML) INJECTION AS NEEDED
Status: DISCONTINUED | OUTPATIENT
Start: 2023-10-30 | End: 2023-10-30 | Stop reason: SURG

## 2023-10-30 RX ADMIN — PROPOFOL 100 MG: 10 INJECTION, EMULSION INTRAVENOUS at 09:14

## 2023-10-30 RX ADMIN — Medication 40 MG: at 09:14

## 2023-10-30 RX ADMIN — SODIUM CHLORIDE 70 ML/HR: 9 INJECTION, SOLUTION INTRAVENOUS at 08:47

## 2023-10-30 RX ADMIN — PROPOFOL 100 MG: 10 INJECTION, EMULSION INTRAVENOUS at 09:15
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
## 2023-10-30 NOTE — ANESTHESIA PREPROCEDURE EVALUATION
Anesthesia Evaluation     Patient summary reviewed and Nursing notes reviewed   no history of anesthetic complications:   NPO Solid Status: > 8 hours  NPO Liquid Status: > 4 hours           Airway   Mallampati: II  TM distance: >3 FB  Neck ROM: full  Possible difficult intubation  Dental    (+) upper dentures and lower dentures    Pulmonary     breath sounds clear to auscultation  (+) COPD,sleep apnea  Cardiovascular   Exercise tolerance: good (4-7 METS)    ECG reviewed  Patient on routine beta blocker  Rhythm: regular  Rate: normal    (+) pacemaker pacemaker, ICD interrogated <1 month ago, hypertension, past MI , CAD, angina, CHF , hyperlipidemia    ROS comment: Test Reason : preop testing  Blood Pressure :   */*   mmHG  Vent. Rate :  97 BPM     Atrial Rate :  97 BPM     P-R Int : 262 ms          QRS Dur :  84 ms      QT Int : 360 ms       P-R-T Axes :  80  79  69 degrees     QTc Int : 457 ms     Atrial-paced rhythm with prolonged AV conduction  Abnormal ECG  When compared with ECG of 09-SEP-2019 14:12,  QRS axis shifted right  Criteria for Inferior infarct are no longer present  T wave inversion no longer evident in Inferior leads  Confirmed by ANDIE CERON ([id number]) on 6/30/2023 12:10:22 PM     Referred By: CAMI           Confirmed By: ANDIE CERON          Neuro/Psych  (+) seizures (d/t med reaction to phenergan), headaches, dizziness/light headedness, psychiatric history Depression and Anxiety  GI/Hepatic/Renal/Endo    (+) GERD, GI bleeding     Musculoskeletal     (+) back pain  Abdominal    Substance History   (+) drug use     OB/GYN negative ob/gyn ROS         Other   arthritis,     ROS/Med Hx Other: Mild coronary calcification with an Agatston score = 8 using the AJ-130 method, which represents 66 percentile when matched for age, gender and ethnicity.  Vascular age is 55 years.     2. No obvious plaque or stenosis noted in the coronary arteries.     3. CAD-RADS 0: Management recommendations:  Reassurance.  Consider non-atherosclerotic causes of chest pain.     4.  No significant non coronary cardiac findings in particular normal cardiac chambers, non-coronary vessels in the field of view and unremarkable pericardium.     5.  Extracardiac structures in the field of view are unremarkable.                   Anesthesia Plan    ASA 3     MAC     (Risks and benefits discussed including risk of aspiration, recall and dental damage. All patient questions answered.    Will continue with plan of care.)  intravenous induction     Anesthetic plan, risks, benefits, and alternatives have been provided, discussed and informed consent has been obtained with: patient.  Pre-procedure education provided    CODE STATUS:

## 2023-10-30 NOTE — ANESTHESIA POSTPROCEDURE EVALUATION
Patient: Melita Sepulveda    Procedure Summary       Date: 10/30/23 Room / Location: [facility] ENDOSCOPY 2 / [facility] ENDOSCOPY    Anesthesia Start: 0907 Anesthesia Stop: 0923    Procedure: ESOPHAGOGASTRODUODENOSCOPY WITH BIOPSY & COLD BIOPSY POLYPECTOMY (Esophagus) Diagnosis:       Nausea      Pharyngoesophageal dysphagia      (Nausea [R11.0])      (Pharyngoesophageal dysphagia [R13.14])    Surgeons: Jackie Gasca MD Provider: Nestor Lopez CRNA    Anesthesia Type: MAC ASA Status: 3            Anesthesia Type: MAC    Vitals  HR 79  SAT 96  Bp 97\67  RESP 12  Temp 97      Post Anesthesia Care and Evaluation    Patient location during evaluation: bedside  Patient participation: complete - patient participated  Level of consciousness: awake and alert and sleepy but conscious  Pain score: 0  Pain management: adequate    Airway patency: patent  Anesthetic complications: No anesthetic complications  PONV Status: none  Cardiovascular status: acceptable  Respiratory status: acceptable  Hydration status: acceptable

## 2023-10-30 NOTE — DISCHARGE INSTRUCTIONS
- Discharge patient to home (ambulatory).   - FODMAP diet.   - Anti reflux measures  - PPI daily  - Lifestyle and dietary changes.   - Await pathology results.   - Return to my office in 8 weeks    No pushing, pulling, tugging,  heavy lifting, or strenuous activity.  No major decision making, driving, or drinking alcoholic beverages for 24 hours. ( due to the medications you have  received)  Always use good hand hygiene/washing techniques.  NO driving while taking pain medications.    To assist you in voiding:  Drink plenty of fluids  Listen to running water while attempting to void.    If you are unable to urinate and you have an uncomfortable urge to void or it has been   6 hours since you were discharged, return to the Emergency Room

## 2023-10-31 ENCOUNTER — OFFICE VISIT (OUTPATIENT)
Dept: SLEEP MEDICINE | Facility: HOSPITAL | Age: 63
End: 2023-10-31
Payer: MEDICARE

## 2023-10-31 VITALS
DIASTOLIC BLOOD PRESSURE: 77 MMHG | OXYGEN SATURATION: 93 % | SYSTOLIC BLOOD PRESSURE: 106 MMHG | HEIGHT: 64 IN | WEIGHT: 134.6 LBS | BODY MASS INDEX: 22.98 KG/M2 | HEART RATE: 97 BPM

## 2023-10-31 DIAGNOSIS — I42.9 CARDIOMYOPATHY, UNSPECIFIED TYPE: ICD-10-CM

## 2023-10-31 DIAGNOSIS — G47.19 EXCESSIVE DAYTIME SLEEPINESS: ICD-10-CM

## 2023-10-31 DIAGNOSIS — I10 ESSENTIAL HYPERTENSION: ICD-10-CM

## 2023-10-31 DIAGNOSIS — G47.33 OSA (OBSTRUCTIVE SLEEP APNEA): Primary | ICD-10-CM

## 2023-10-31 DIAGNOSIS — G47.34 NOCTURNAL HYPOXEMIA: ICD-10-CM
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
## 2023-11-01 LAB — REF LAB TEST METHOD: NORMAL

## 2023-12-07 ENCOUNTER — TELEPHONE (OUTPATIENT)
Dept: GASTROENTEROLOGY | Facility: CLINIC | Age: 63
End: 2023-12-07
Payer: MEDICARE

## 2023-12-07 ENCOUNTER — OFFICE VISIT (OUTPATIENT)
Dept: NEUROLOGY | Facility: CLINIC | Age: 63
End: 2023-12-07
Payer: MEDICARE

## 2023-12-07 VITALS
HEART RATE: 86 BPM | TEMPERATURE: 97.1 F | WEIGHT: 133 LBS | SYSTOLIC BLOOD PRESSURE: 100 MMHG | OXYGEN SATURATION: 97 % | DIASTOLIC BLOOD PRESSURE: 80 MMHG | BODY MASS INDEX: 22.71 KG/M2 | HEIGHT: 64 IN

## 2023-12-07 DIAGNOSIS — R25.1 TREMOR: Primary | ICD-10-CM

## 2023-12-07 DIAGNOSIS — R20.0 NUMBNESS AND TINGLING IN BOTH HANDS: ICD-10-CM

## 2023-12-07 DIAGNOSIS — R20.2 NUMBNESS AND TINGLING IN BOTH HANDS: ICD-10-CM

## 2023-12-07 RX ORDER — PRIMIDONE 50 MG/1
50 TABLET ORAL NIGHTLY
Qty: 30 TABLET | Refills: 3 | Status: SHIPPED | OUTPATIENT
Start: 2023-12-07

## 2023-12-07 NOTE — TELEPHONE ENCOUNTER
----- Message from ARCENIO Thompson sent at 12/7/2023 12:50 PM EST -----  Let her know that as long as her stool is not red or black, it is normal for stool to be shades of green/yellow/brown. We did not prescribe the Movantik, I'm not sure who has been prescribing this for her. It can cause some cramping. Keep the appointment for the colonoscopy. If her symptoms are severe or worsening, she needs to go to the ER for evaluation.     ----- Message -----  From: Radha Paul MA  Sent: 12/7/2023   9:44 AM EST  To: ARCENIO Thompson    She has been taking Movantik, but it makes her stool emerald green.  Also causes abd cramping.

## 2024-01-02 ENCOUNTER — TELEPHONE (OUTPATIENT)
Dept: NEUROLOGY | Facility: CLINIC | Age: 64
End: 2024-01-02

## 2024-01-02 NOTE — TELEPHONE ENCOUNTER
Provider: JAMARI SANCHEZ APRN    Caller: LILLIANA    Relationship to Patient: SELF    Phone Number: [phone]    Reason for Call:  CALLING REGARDING THE TREMOR MEDICATION.  STATED HER PCP LOOKED AT EVERYTHING THAT THE PROVIDER RECOMMENDED AND THE PCP SAID THEY COULD ALL CAUSE TREMORS.  PT IS WANTING TO KNOW WHAT OTHER RECOMMENDATIONS PROVIDER WOULD RECOMMEND?    When was the patient last seen: 12-7-23    PLEASE CALL & ADVISE

## 2024-01-08 NOTE — PRE-PROCEDURE INSTRUCTIONS
PAT phone history completed with patient for upcoming procedure on 1/16/23 with Dr. Gasca.    PAT PASS reviewed with patient and they verbalize understanding of the following:     Do not eat or drink anything after midnight the night before procedure unless otherwise instructed by physician/surgeon's office, this includes no gum, candy, mints, tobacco products or e-cigarettes.  Do not shave the area to be operated on at least 48 hours prior to procedure.  Do not wear makeup, lotion, hair products, or nail polish.  Do not wear any jewelry and remove all piercings.  Do not wear any adhesive if you wear dentures.  Do not wear contacts; bring in glasses if needed.  Only take medications on the morning of procedure as instructed by PAT nurse per anesthesia guidelines or as instructed by physician's office.  If you are on any blood thinners reach out to the physician/surgeon's office for instructions on when/if they will need to be stopped prior to procedure.  Bring in picture ID and insurance card, advanced directive copies if applicable, CPAP/BIPAP/Inhalers if indicated morning of procedure, leave any other valuables at home.  Ensure you have arranged for someone to drive you home the day of your procedure and someone to care for you at home afterwards. It is recommended that you do not drive, drink alcohol, or make any major legal decisions for at least 24 hours after your procedure is complete.    Instructions given on hospital entrance and registration location.

## 2024-01-13 DIAGNOSIS — N95.8 GENITOURINARY SYNDROME OF MENOPAUSE: ICD-10-CM

## 2024-01-15 RX ORDER — ESTRADIOL 0.1 MG/G
CREAM VAGINAL
Qty: 42.5 G | Refills: 3 | Status: SHIPPED | OUTPATIENT
Start: 2024-01-15

## 2024-01-19 ENCOUNTER — TELEPHONE (OUTPATIENT)
Dept: UROLOGY | Facility: CLINIC | Age: 64
End: 2024-01-19
Payer: MEDICARE

## 2024-01-19 NOTE — TELEPHONE ENCOUNTER
Trying to reach patient to talk about her medication, trying to clarify if she is using the correct amount.    Siddharth,CMA

## 2024-02-06 ENCOUNTER — ANESTHESIA (OUTPATIENT)
Dept: GASTROENTEROLOGY | Facility: HOSPITAL | Age: 64
End: 2024-02-06
Payer: MEDICARE

## 2024-02-06 ENCOUNTER — HOSPITAL ENCOUNTER (OUTPATIENT)
Facility: HOSPITAL | Age: 64
Setting detail: HOSPITAL OUTPATIENT SURGERY
Discharge: HOME | End: 2024-02-06
Attending: INTERNAL MEDICINE | Admitting: INTERNAL MEDICINE
Payer: MEDICARE

## 2024-02-06 ENCOUNTER — ANESTHESIA EVENT (OUTPATIENT)
Dept: GASTROENTEROLOGY | Facility: HOSPITAL | Age: 64
End: 2024-02-06
Payer: MEDICARE

## 2024-02-06 VITALS
RESPIRATION RATE: 18 BRPM | OXYGEN SATURATION: 99 % | TEMPERATURE: 97.5 F | WEIGHT: 137 LBS | HEIGHT: 64 IN | HEART RATE: 82 BPM | DIASTOLIC BLOOD PRESSURE: 87 MMHG | SYSTOLIC BLOOD PRESSURE: 123 MMHG | BODY MASS INDEX: 23.39 KG/M2

## 2024-02-06 DIAGNOSIS — Z80.0 FAMILY HISTORY OF COLON CANCER: ICD-10-CM

## 2024-02-06 DIAGNOSIS — R19.4 CHANGE IN BOWEL HABITS: ICD-10-CM

## 2024-02-06 DIAGNOSIS — Z12.11 ENCOUNTER FOR SCREENING FOR MALIGNANT NEOPLASM OF COLON: ICD-10-CM

## 2024-02-06 PROCEDURE — 25810000003 SODIUM CHLORIDE 0.9 % SOLUTION: Performed by: NURSE ANESTHETIST, CERTIFIED REGISTERED

## 2024-02-06 PROCEDURE — 45385 COLONOSCOPY W/LESION REMOVAL: CPT | Performed by: INTERNAL MEDICINE

## 2024-02-06 PROCEDURE — 25810000003 SODIUM CHLORIDE 0.9 % SOLUTION: Performed by: NURSE PRACTITIONER

## 2024-02-06 PROCEDURE — 25010000002 PROPOFOL 10 MG/ML EMULSION: Performed by: NURSE ANESTHETIST, CERTIFIED REGISTERED

## 2024-02-06 PROCEDURE — 45390 COLONOSCOPY W/RESECTION: CPT | Performed by: INTERNAL MEDICINE

## 2024-02-06 PROCEDURE — 45380 COLONOSCOPY AND BIOPSY: CPT | Performed by: INTERNAL MEDICINE

## 2024-02-06 DEVICE — DEV CLIP ENDO RESOLUTION360 CONTRL ROT 235CM: Type: IMPLANTABLE DEVICE | Site: TRANSVERSE COLON | Status: FUNCTIONAL

## 2024-02-06 RX ORDER — SODIUM CHLORIDE 9 MG/ML
70 INJECTION, SOLUTION INTRAVENOUS CONTINUOUS PRN
Status: DISCONTINUED | OUTPATIENT
Start: 2024-02-06 | End: 2024-02-06 | Stop reason: HOSPADM

## 2024-02-06 RX ORDER — SODIUM CHLORIDE 9 MG/ML
INJECTION, SOLUTION INTRAVENOUS CONTINUOUS PRN
Status: DISCONTINUED | OUTPATIENT
Start: 2024-02-06 | End: 2024-02-06 | Stop reason: SURG

## 2024-02-06 RX ORDER — SIMETHICONE 40MG/0.6ML
SUSPENSION, DROPS(FINAL DOSAGE FORM)(ML) ORAL AS NEEDED
Status: DISCONTINUED | OUTPATIENT
Start: 2024-02-06 | End: 2024-02-06 | Stop reason: HOSPADM

## 2024-02-06 RX ORDER — PROPOFOL 10 MG/ML
VIAL (ML) INTRAVENOUS CONTINUOUS PRN
Status: DISCONTINUED | OUTPATIENT
Start: 2024-02-06 | End: 2024-02-06 | Stop reason: SURG

## 2024-02-06 RX ADMIN — LIDOCAINE HYDROCHLORIDE 40 MG: 20 INJECTION, SOLUTION INTRAVENOUS at 11:08

## 2024-02-06 RX ADMIN — PROPOFOL 180 MCG/KG/MIN: 10 INJECTION, EMULSION INTRAVENOUS at 11:08

## 2024-02-06 RX ADMIN — SODIUM CHLORIDE: 9 INJECTION, SOLUTION INTRAVENOUS at 10:58

## 2024-02-06 RX ADMIN — SODIUM CHLORIDE 70 ML/HR: 9 INJECTION, SOLUTION INTRAVENOUS at 09:43

## 2024-02-06 NOTE — ANESTHESIA PREPROCEDURE EVALUATION
Anesthesia Evaluation     Patient summary reviewed and Nursing notes reviewed   no history of anesthetic complications:   NPO Solid Status: > 8 hours  NPO Liquid Status: > 4 hours           Airway   Mallampati: II  TM distance: >3 FB  Neck ROM: full  Possible difficult intubation  Dental    (+) upper dentures and lower dentures    Pulmonary     breath sounds clear to auscultation  (+) COPD,sleep apnea  Cardiovascular   Exercise tolerance: good (4-7 METS)    ECG reviewed  Patient on routine beta blocker  Rhythm: regular  Rate: normal    (+) pacemaker pacemaker, ICD interrogated <1 month ago, hypertension, past MI , CAD, angina, CHF Systolic <55%, hyperlipidemia    ROS comment: ECHO 4/22/22  EF = 40-50%        Neuro/Psych  (+) seizures (d/t med reaction to phenergan), headaches, dizziness/light headedness, psychiatric history Depression and Anxiety  GI/Hepatic/Renal/Endo    (+) GERD, GI bleeding     Musculoskeletal     (+) back pain  Abdominal    Substance History   (+) drug use     OB/GYN negative ob/gyn ROS         Other   arthritis,                       Anesthesia Plan    ASA 3     MAC     (Risks and benefits discussed including risk of aspiration, recall and dental damage. All patient questions answered.    Will continue with plan of care.)  intravenous induction     Anesthetic plan, risks, benefits, and alternatives have been provided, discussed and informed consent has been obtained with: patient.  Pre-procedure education provided    CODE STATUS:

## 2024-02-06 NOTE — DISCHARGE INSTRUCTIONS
Rest today  No pushing,pulling,tugging,heavy lifting, or strenuous activity   No major decision making,driving,or drinking alcoholic beverages for 24 hours due to the sedation you received  Always use good hand hygiene/washing technique  No driving on pain medication.    To assist you in voiding:  Drink plenty of fluids  Listen to running water while attempting to void.    If you are unable to urinate and you have an uncomfortable urge to void or it has been   6 hours since you were discharged, return to the Emergency Room    - Discharge patient to home (ambulatory).   - Resume previous diet.   - Continue present medications.   - Await pathology results.   - Repeat colonoscopy in 6 months for surveillance.   - Return to GI office in 8 weeks.
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
## 2024-02-06 NOTE — ANESTHESIA POSTPROCEDURE EVALUATION
Patient: Melita Sepulveda    Procedure Summary       Date: 02/06/24 Room / Location: [facility] ENDOSCOPY 2 / [facility] ENDOSCOPY    Anesthesia Start: 1058 Anesthesia Stop: 1206    Procedure: COLONOSCOPY with biopsy, polypectomy, eleview, EMR, tattooing of transverse colon, clipping of transverse colon x3 (Anus) Diagnosis:       Encounter for screening for malignant neoplasm of colon      Family history of colon cancer      (Encounter for screening for malignant neoplasm of colon [Z12.11])      (Family history of colon cancer [Z80.0])    Surgeons: Jackie Gasca MD Provider: Nestor Vasquez CRNA    Anesthesia Type: MAC ASA Status: 3            Anesthesia Type: MAC    Vitals  Vitals Value Taken Time   /87 02/06/24 1235   Temp 97.5 °F (36.4 °C) 02/06/24 1210   Pulse 82 02/06/24 1235   Resp 18 02/06/24 1235   SpO2 99 % 02/06/24 1235           Post Anesthesia Care and Evaluation    Patient location during evaluation: PHASE II  Patient participation: complete - patient participated  Level of consciousness: awake and alert  Pain score: 0  Pain management: satisfactory to patient    Airway patency: patent  Anesthetic complications: No anesthetic complications  PONV Status: none  Cardiovascular status: acceptable and stable  Respiratory status: acceptable and spontaneous ventilation  Hydration status: acceptable    Comments: Vitals signs as noted in nursing documentation as per protocol.

## 2024-02-07 ENCOUNTER — APPOINTMENT (OUTPATIENT)
Dept: CT IMAGING | Facility: HOSPITAL | Age: 64
End: 2024-02-07
Payer: MEDICARE

## 2024-02-07 ENCOUNTER — HOSPITAL ENCOUNTER (EMERGENCY)
Facility: HOSPITAL | Age: 64
Discharge: HOME | End: 2024-02-07
Attending: EMERGENCY MEDICINE | Admitting: EMERGENCY MEDICINE
Payer: MEDICARE

## 2024-02-07 ENCOUNTER — TELEPHONE (OUTPATIENT)
Dept: GASTROENTEROLOGY | Facility: CLINIC | Age: 64
End: 2024-02-07
Payer: MEDICARE

## 2024-02-07 ENCOUNTER — PROCEDURE VISIT (OUTPATIENT)
Dept: ORTHOPEDIC SURGERY | Facility: CLINIC | Age: 64
End: 2024-02-07
Payer: MEDICARE

## 2024-02-07 VITALS
DIASTOLIC BLOOD PRESSURE: 65 MMHG | TEMPERATURE: 98.2 F | RESPIRATION RATE: 16 BRPM | WEIGHT: 134 LBS | SYSTOLIC BLOOD PRESSURE: 102 MMHG | HEART RATE: 81 BPM | BODY MASS INDEX: 22.88 KG/M2 | HEIGHT: 64 IN | OXYGEN SATURATION: 96 %

## 2024-02-07 DIAGNOSIS — R20.2 NUMBNESS AND TINGLING IN BOTH HANDS: ICD-10-CM

## 2024-02-07 DIAGNOSIS — G56.03 CARPAL TUNNEL SYNDROME, BILATERAL: Primary | ICD-10-CM

## 2024-02-07 DIAGNOSIS — R10.84 GENERALIZED ABDOMINAL PAIN: Primary | ICD-10-CM

## 2024-02-07 DIAGNOSIS — R20.0 NUMBNESS AND TINGLING IN BOTH HANDS: ICD-10-CM

## 2024-02-07 DIAGNOSIS — M79.601 RIGHT UPPER LIMB PAIN: ICD-10-CM

## 2024-02-07 LAB
ALBUMIN SERPL-MCNC: 4.3 G/DL (ref 3.5–5.2)
ALBUMIN/GLOB SERPL: 1.6 G/DL
ALP SERPL-CCNC: 97 U/L (ref 39–117)
ALT SERPL W P-5'-P-CCNC: <5 U/L (ref 1–33)
ANION GAP SERPL CALCULATED.3IONS-SCNC: 10.5 MMOL/L (ref 5–15)
AST SERPL-CCNC: 18 U/L (ref 1–32)
BASOPHILS # BLD AUTO: 0.03 10*3/MM3 (ref 0–0.2)
BASOPHILS NFR BLD AUTO: 0.3 % (ref 0–1.5)
BILIRUB SERPL-MCNC: 0.6 MG/DL (ref 0–1.2)
BUN SERPL-MCNC: 6 MG/DL (ref 8–23)
BUN/CREAT SERPL: 9.7 (ref 7–25)
CALCIUM SPEC-SCNC: 8.8 MG/DL (ref 8.6–10.5)
CHLORIDE SERPL-SCNC: 100 MMOL/L (ref 98–107)
CO2 SERPL-SCNC: 27.5 MMOL/L (ref 22–29)
CREAT SERPL-MCNC: 0.62 MG/DL (ref 0.57–1)
D-LACTATE SERPL-SCNC: 0.9 MMOL/L (ref 0.5–2)
DEPRECATED RDW RBC AUTO: 46.2 FL (ref 37–54)
EGFRCR SERPLBLD CKD-EPI 2021: 100.2 ML/MIN/1.73
EOSINOPHIL # BLD AUTO: 0.11 10*3/MM3 (ref 0–0.4)
EOSINOPHIL NFR BLD AUTO: 0.9 % (ref 0.3–6.2)
ERYTHROCYTE [DISTWIDTH] IN BLOOD BY AUTOMATED COUNT: 12.3 % (ref 12.3–15.4)
GLOBULIN UR ELPH-MCNC: 2.7 GM/DL
GLUCOSE SERPL-MCNC: 111 MG/DL (ref 65–99)
HCT VFR BLD AUTO: 36.2 % (ref 34–46.6)
HGB BLD-MCNC: 12.3 G/DL (ref 12–15.9)
HOLD SPECIMEN: NORMAL
HOLD SPECIMEN: NORMAL
IMM GRANULOCYTES # BLD AUTO: 0.03 10*3/MM3 (ref 0–0.05)
IMM GRANULOCYTES NFR BLD AUTO: 0.3 % (ref 0–0.5)
LIPASE SERPL-CCNC: 11 U/L (ref 13–60)
LYMPHOCYTES # BLD AUTO: 2.86 10*3/MM3 (ref 0.7–3.1)
LYMPHOCYTES NFR BLD AUTO: 24.3 % (ref 19.6–45.3)
MCH RBC QN AUTO: 34.4 PG (ref 26.6–33)
MCHC RBC AUTO-ENTMCNC: 34 G/DL (ref 31.5–35.7)
MCV RBC AUTO: 101.1 FL (ref 79–97)
MONOCYTES # BLD AUTO: 0.83 10*3/MM3 (ref 0.1–0.9)
MONOCYTES NFR BLD AUTO: 7.1 % (ref 5–12)
NEUTROPHILS NFR BLD AUTO: 67.1 % (ref 42.7–76)
NEUTROPHILS NFR BLD AUTO: 7.91 10*3/MM3 (ref 1.7–7)
NRBC BLD AUTO-RTO: 0 /100 WBC (ref 0–0.2)
PLATELET # BLD AUTO: 239 10*3/MM3 (ref 140–450)
PMV BLD AUTO: 9.8 FL (ref 6–12)
POTASSIUM SERPL-SCNC: 3.3 MMOL/L (ref 3.5–5.2)
PROCALCITONIN SERPL-MCNC: 0.05 NG/ML (ref 0–0.25)
PROT SERPL-MCNC: 7 G/DL (ref 6–8.5)
RBC # BLD AUTO: 3.58 10*6/MM3 (ref 3.77–5.28)
REF LAB TEST METHOD: NORMAL
SODIUM SERPL-SCNC: 138 MMOL/L (ref 136–145)
WBC NRBC COR # BLD AUTO: 11.77 10*3/MM3 (ref 3.4–10.8)
WHOLE BLOOD HOLD COAG: NORMAL
WHOLE BLOOD HOLD SPECIMEN: NORMAL

## 2024-02-07 PROCEDURE — 84145 PROCALCITONIN (PCT): CPT | Performed by: NURSE PRACTITIONER

## 2024-02-07 PROCEDURE — 83605 ASSAY OF LACTIC ACID: CPT | Performed by: EMERGENCY MEDICINE

## 2024-02-07 PROCEDURE — 99284 EMERGENCY DEPT VISIT MOD MDM: CPT

## 2024-02-07 PROCEDURE — 74176 CT ABD & PELVIS W/O CONTRAST: CPT

## 2024-02-07 PROCEDURE — 85025 COMPLETE CBC W/AUTO DIFF WBC: CPT | Performed by: EMERGENCY MEDICINE

## 2024-02-07 PROCEDURE — 83690 ASSAY OF LIPASE: CPT | Performed by: EMERGENCY MEDICINE

## 2024-02-07 PROCEDURE — 80053 COMPREHEN METABOLIC PANEL: CPT | Performed by: EMERGENCY MEDICINE

## 2024-02-07 RX ORDER — AMOXICILLIN AND CLAVULANATE POTASSIUM 875; 125 MG/1; MG/1
1 TABLET, FILM COATED ORAL 2 TIMES DAILY
Qty: 10 TABLET | Refills: 0 | Status: SHIPPED | OUTPATIENT
Start: 2024-02-07 | End: 2024-02-12

## 2024-02-07 RX ORDER — SODIUM CHLORIDE 0.9 % (FLUSH) 0.9 %
10 SYRINGE (ML) INJECTION AS NEEDED
Status: DISCONTINUED | OUTPATIENT
Start: 2024-02-07 | End: 2024-02-07 | Stop reason: HOSPADM

## 2024-02-07 NOTE — TELEPHONE ENCOUNTER
Fadumo Zuñiga called from [facility], stating that patient was in a lot of pain, had colonoscopy yesterday, I spoke with GI provider, patient needs to be evaluated in ER, communicated ER evaluation needed to Fadumo, she states understanding.
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
## 2024-02-13 DIAGNOSIS — R20.2 NUMBNESS AND TINGLING IN BOTH HANDS: Primary | ICD-10-CM

## 2024-02-13 DIAGNOSIS — R94.131 ABNORMAL EMG: ICD-10-CM

## 2024-02-13 DIAGNOSIS — R20.0 NUMBNESS AND TINGLING IN BOTH HANDS: Primary | ICD-10-CM

## 2024-04-08 ENCOUNTER — OFFICE VISIT (OUTPATIENT)
Dept: NEUROLOGY | Facility: CLINIC | Age: 64
End: 2024-04-08
Payer: MEDICARE

## 2024-04-08 VITALS
DIASTOLIC BLOOD PRESSURE: 60 MMHG | HEIGHT: 64 IN | OXYGEN SATURATION: 97 % | WEIGHT: 133 LBS | TEMPERATURE: 98.2 F | HEART RATE: 80 BPM | BODY MASS INDEX: 22.71 KG/M2 | SYSTOLIC BLOOD PRESSURE: 100 MMHG

## 2024-04-08 DIAGNOSIS — R25.1 TREMOR: Primary | ICD-10-CM

## 2024-04-08 DIAGNOSIS — R94.131 ABNORMAL EMG: ICD-10-CM

## 2024-04-08 DIAGNOSIS — R20.0 NUMBNESS AND TINGLING IN RIGHT HAND: ICD-10-CM

## 2024-04-08 DIAGNOSIS — R20.2 NUMBNESS AND TINGLING IN RIGHT HAND: ICD-10-CM

## 2024-04-08 PROCEDURE — 1160F RVW MEDS BY RX/DR IN RCRD: CPT | Performed by: NURSE PRACTITIONER

## 2024-04-08 PROCEDURE — 3078F DIAST BP <80 MM HG: CPT | Performed by: NURSE PRACTITIONER

## 2024-04-08 PROCEDURE — 99214 OFFICE O/P EST MOD 30 MIN: CPT | Performed by: NURSE PRACTITIONER

## 2024-04-08 PROCEDURE — 3074F SYST BP LT 130 MM HG: CPT | Performed by: NURSE PRACTITIONER

## 2024-04-08 PROCEDURE — 1159F MED LIST DOCD IN RCRD: CPT | Performed by: NURSE PRACTITIONER

## 2024-04-08 RX ORDER — TOPIRAMATE 25 MG/1
25 TABLET ORAL 2 TIMES DAILY
Qty: 60 TABLET | Refills: 2 | Status: SHIPPED | OUTPATIENT
Start: 2024-04-08

## 2024-04-08 RX ORDER — GABAPENTIN 100 MG/1
CAPSULE ORAL
COMMUNITY
Start: 2024-03-06

## 2024-04-08 RX ORDER — ATORVASTATIN CALCIUM 20 MG/1
1 TABLET, FILM COATED ORAL DAILY
COMMUNITY

## 2024-04-08 RX ORDER — ESCITALOPRAM OXALATE 10 MG/1
TABLET ORAL
COMMUNITY
Start: 2024-04-05

## 2024-04-08 NOTE — PROGRESS NOTES
Follow Up Office Visit      Patient Name: Melita Sepulveda  : 1960   MRN: [id number]     Chief Complaint:    Chief Complaint   Patient presents with   • Follow-up     Tremors; sx are unchanged        History of Present Illness: Melita Sepulveda is a 63 y.o. female who is here today to follow up with tremors of her bilateral hands. She reports Rt > Lt. She says she talked to her PCP about the Celexa as it could have contributed to her tremors. She was changed to Zoloft and caused brain fog and now has recently started Lexapro. She says the Primidone has not been very helpful as she discussed at last office visit.  -She is right-hand dominant.  She frequently drops things from her right hand and has associated tingling and numbness in the right hand    History of Present Illness has been carried forward from her 2023 office note as follows: Melita Sepulveda is a 63 y.o. female who is here today to follow up for tremors.  She was last seen in the clinic on 2023.  She says she has been on Primidone 50 mg at night and says she is not sure that she is seeing any benefit from this. She has tremors of her bilateral outstretched hands.She says these are worse with stress and when she is trying to hold utensils.   She also notes she has been dropping things frequently and has tingling and numbness.   She has stopped opiates a year ago and is now on Suboxone. She says she has lessened her amount of THC gummies. She still has chronic low back pain and had surgery 2020    Her tremors are worse with stress.  She says she has some days where using her phone and using utensils are more difficult than others. She says her mother had tremors as well.      Pertinent Medical History: anxiety, depression, long QT, has Pacemaker/defibrillator, diverticulitis, CHF, MI 2006    Subjective      Review of Systems:   Review of Systems   Neurological:  Positive for tremors.       I have reviewed  "and the following portions of the patient's history were updated as appropriate: past family history, past medical history, past social history, past surgical history and problem list.    Medications:     Current Outpatient Medications:   •  atorvastatin (LIPITOR) 20 MG tablet, Take 1 tablet by mouth Daily., Disp: , Rfl:   •  buprenorphine-naloxone (SUBOXONE) 8-2 MG per SL tablet, Place 0.75 tablets under the tongue Daily., Disp: , Rfl:   •  escitalopram (LEXAPRO) 10 MG tablet, , Disp: , Rfl:   •  estradiol (ESTRACE) 0.1 MG/GM vaginal cream, Use 0.5 gm 3 nights weekly MWF vaginally at bedtime, Disp: 42.5 g, Rfl: 3  •  gabapentin (NEURONTIN) 100 MG capsule, , Disp: , Rfl:   •  ipratropium (ATROVENT) 0.06 % nasal spray, INSTILL 2 SPRAYS IN EACH NOSTRIL FOUR TIMES DAILY AS DIRECTED, Disp: 15 mL, Rfl: 1  •  metoprolol succinate XL (TOPROL-XL) 25 MG 24 hr tablet, Take 1 tablet by mouth every night at bedtime., Disp: , Rfl:   •  Naloxegol Oxalate (Movantik) 25 MG tablet, Take 1 tablet by mouth Daily As Needed., Disp: , Rfl:   •  O2 (OXYGEN), Inhale 2 L/min 1 (One) Time. Patient only uses as needed, Disp: , Rfl:   •  ondansetron ODT (ZOFRAN-ODT) 4 MG disintegrating tablet, Every 8 (Eight) Hours As Needed., Disp: , Rfl:   •  pantoprazole (PROTONIX) 40 MG EC tablet, Take 1 tablet by mouth Daily., Disp: , Rfl:   •  potassium chloride (K-DUR,KLOR-CON) 20 MEQ CR tablet, Take 1 tablet by mouth Daily., Disp: 90 tablet, Rfl: 0  •  primidone (MYSOLINE) 50 MG tablet, Take 1 tablet by mouth Every Night., Disp: 30 tablet, Rfl: 3  •  traZODone (DESYREL) 100 MG tablet, Take 2 tablets by mouth Every Night., Disp: , Rfl:   •  topiramate (Topamax) 25 MG tablet, Take 1 tablet by mouth 2 (Two) Times a Day., Disp: 60 tablet, Rfl: 2    Allergies:   Allergies   Allergen Reactions   • Levaquin [Levofloxacin] Hives   • Promethazine Other (See Comments) and Unknown - High Severity     seizure   • Vancomycin Other (See Comments)     \"ed\" " "syndrome   • Dicyclomine Rash and Confusion   • Doxycycline GI Intolerance   • Morphine And Related Nausea And Vomiting   • Adhesive Tape Rash     Surgical dressing on pacemaker/defibrillator, can tolerate paper tape   • Flonase [Fluticasone] Other (See Comments)     \"dries my nose out really bad\"   • Morphine Nausea And Vomiting     With short-acting only   • Sulfa Antibiotics Rash   • Wound Dressing Adhesive Rash       Objective     Physical Exam:  Vital Signs:   Vitals:    04/08/24 1017   BP: 100/60   Pulse: 80   Temp: 98.2 °F (36.8 °C)   SpO2: 97%   Weight: 60.3 kg (133 lb)   Height: 162.6 cm (64\")   PainSc:   7   PainLoc: Back     Body mass index is 22.83 kg/m².    Physical Exam  Constitutional:       Appearance: Normal appearance.   HENT:      Head: Normocephalic.   Eyes:      Conjunctiva/sclera: Conjunctivae normal.   Pulmonary:      Effort: Pulmonary effort is normal.   Musculoskeletal:         General: Normal range of motion.   Skin:     General: Skin is warm and dry.   Neurological:      General: No focal deficit present.      Mental Status: She is alert and oriented to person, place, and time.      Cranial Nerves: Cranial nerves 2-12 are intact. No dysarthria.      Motor: Tremor present. No pronator drift.      Coordination: Coordination is intact.      Gait: Gait is intact.      Comments: Fine tremor to bilateral outstretched hands.  Right greater than the left   Psychiatric:         Attention and Perception: Attention normal.         Mood and Affect: Mood and affect normal.         Speech: Speech normal.         Behavior: Behavior normal. Behavior is cooperative.         Thought Content: Thought content normal.         Cognition and Memory: Cognition normal.         Judgment: Judgment normal.         Neurologic Exam     Mental Status   Oriented to person, place, and time.   Attention: normal. Concentration: normal.   Speech: speech is normal   Level of consciousness: alert  Knowledge: good.     Cranial " Nerves   Cranial nerves II through XII intact.     Gait, Coordination, and Reflexes     Gait  Gait: normal       Assessment / Plan      Assessment/Plan:   Diagnoses and all orders for this visit:    1. Tremor (Primary)  -     topiramate (Topamax) 25 MG tablet; Take 1 tablet by mouth 2 (Two) Times a Day.  Dispense: 60 tablet; Refill: 2    2. Abnormal EMG  -     Ambulatory Referral to Orthopedic Surgery    3. Numbness and tingling in right hand  -     Ambulatory Referral to Orthopedic Surgery       Patient had EMG on 2/8/2024 which shows diffuse atrophy of the right abductor pollicis brevis muscle.  There was a severe loss of amplitude of the evoked motor response from the right median nerve to the ABP muscle.  She does have a history of having [facility] joint surgery in 2017 at .  Referral has been placed for abnormal EMG.  Patient reports she frequently drops things from her right hand.  She is right-hand dominant.    Will start on low-dose topiramate 25 mg daily and after 2 weeks may increase to twice daily for her tremors. Indications and side effects discussed with patient she verbalizes understanding.  Patient will call in the interim if she has any questions or concerns or changes.    Follow Up:   Return in about 3 months (around 7/8/2024).    ARCENIO Goldberg, FNP-BC  [facility]

## 2024-04-18 ENCOUNTER — OFFICE VISIT (OUTPATIENT)
Dept: GASTROENTEROLOGY | Facility: CLINIC | Age: 64
End: 2024-04-18
Payer: MEDICARE

## 2024-04-18 VITALS
SYSTOLIC BLOOD PRESSURE: 106 MMHG | BODY MASS INDEX: 22.2 KG/M2 | OXYGEN SATURATION: 96 % | WEIGHT: 130 LBS | HEART RATE: 130 BPM | RESPIRATION RATE: 14 BRPM | DIASTOLIC BLOOD PRESSURE: 68 MMHG | HEIGHT: 64 IN

## 2024-04-18 DIAGNOSIS — D12.6 ADENOMATOUS POLYP OF COLON, UNSPECIFIED PART OF COLON: ICD-10-CM

## 2024-04-18 DIAGNOSIS — Z80.0 FAMILY HISTORY OF COLON CANCER: ICD-10-CM

## 2024-04-18 DIAGNOSIS — K92.9 FUNCTIONAL GASTROINTESTINAL DISORDER: Primary | Chronic | ICD-10-CM

## 2024-04-18 DIAGNOSIS — T40.2X5A THERAPEUTIC OPIOID INDUCED CONSTIPATION: Chronic | ICD-10-CM

## 2024-04-18 DIAGNOSIS — K59.03 THERAPEUTIC OPIOID INDUCED CONSTIPATION: Chronic | ICD-10-CM

## 2024-04-18 DIAGNOSIS — K21.9 GASTROESOPHAGEAL REFLUX DISEASE WITHOUT ESOPHAGITIS: Chronic | ICD-10-CM

## 2024-04-18 RX ORDER — BISACODYL 5 MG/1
TABLET, DELAYED RELEASE ORAL
Qty: 4 TABLET | Refills: 0 | Status: SHIPPED | OUTPATIENT
Start: 2024-04-18

## 2024-04-18 RX ORDER — SODIUM CHLORIDE 9 MG/ML
70 INJECTION, SOLUTION INTRAVENOUS CONTINUOUS PRN
OUTPATIENT
Start: 2024-04-18

## 2024-04-18 RX ORDER — SODIUM, POTASSIUM,MAG SULFATES 17.5-3.13G
SOLUTION, RECONSTITUTED, ORAL ORAL
Qty: 177 ML | Refills: 0 | Status: SHIPPED | OUTPATIENT
Start: 2024-04-18

## 2024-04-18 NOTE — PROGRESS NOTES
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 beans, robbi beans, navy beans, lentils, black-eyed peas, and split peas.  Dairy  Milk, yogurt, ice cream, and soft cheese. Cream and sour cream. Milk-based sauces. Custard. Buttermilk. Soy milk.  Seasoning and other foods  Any sugar-free gum or candy. Foods that contain artificial sweeteners such as sorbitol, mannitol, isomalt, or xylitol. Foods that contain honey, high-fructose corn syrup, or agave. Bouillon, vegetable stock, beef stock, and chicken stock. Garlic and onion powder. Condiments made with onion, such as hummus, chutney, pickles, relish, salad dressing, and salsa. Tomato paste.  Beverages  Chicory-based drinks. Coffee substitutes. Chamomile tea. Fennel tea. Sweet or fortified luke such as port or kristi. Diet soft drinks made with isomalt, mannitol, maltitol, sorbitol, or xylitol. Apple, pear, and raegan juice. Juices with high-fructose corn syrup.  The items listed above may not be a complete list of foods and beverages you  should avoid. Contact a dietitian for more information.     Summary  FODMAP stands for fermentable oligosaccharides, disaccharides, monosaccharides, and polyols. These are sugars that are hard for some people to digest.  A low-FODMAP eating plan is a short-term diet that helps to ease symptoms of certain bowel diseases.  The eating plan usually lasts up to 6 weeks. After that, high-FODMAP foods are reintroduced gradually and one at a time. This can help you find out which foods may be causing symptoms.  A low-FODMAP eating plan can be complicated. It is best to work with a dietitian who has experience with this type of plan.  This information is not intended to replace advice given to you by your health care provider. Make sure you discuss any questions you have with your health care provider.  Document Revised: 05/06/2021 Document Reviewed: 05/06/2021  [employer] Patient Education © 2023 Elsevier Inc.                ARCENIO Mejia  4/18/2024    Please note that portions of this note were completed with a voice recognition program.

## 2024-04-18 NOTE — PATIENT INSTRUCTIONS
Antireflux measures: Avoid fried, fatty foods, alcohol, chocolate, coffee, tea,  soft drinks, all carbonated beverages (including sparkling water), peppermint and spearmint, spicy foods, tomatoes and tomato based foods, onions, peppers, and garlic.   Other antireflux measures include weight reduction if overweight, avoiding tight clothing around the abdomen, elevating the head of the bed 6 inches with blocks under the head board, and don't drink or eat before going to bed and avoid lying down immediately after meals.  Avoid vaping/smoking/marijuana/marijuana THC gummies/CBD/etc.  Pantoprazole 40 mg 1 by mouth in the am 30 minutes before breakfast.  Zofran 4 mg 1 po every 8 hours as needed for nausea.  The patient should eat 4-5 very small meals throughout the day. Avoid large meals.  It is recommended to eat a softer diet. Meats are best consumed ground. Fruits and vegetables are best consumed cooked or steamed and then mashed.   Low fiber, low fat diet with liberal water intake. May use soluble fiber such as Metamucil daily.   Advised to chew food very well and take sips of water between bites.  Continue Movantik 25 mg daily.   May take Miralax 17 grams daily.  May take stool softeners 2 per day as needed.  Metamucil 1 packet daily or fiber gummies 2-4 per day.   Low FODMAP diet - avoid all dairy. May use lactose free/dairy free alternatives such as almond milk, rice milk, oat milk, etc.   Colonoscopy: The indications, technique, alternatives and potential risk and complications were discussed with the patient including but not limited to bleeding, perforations, missing lesions and anesthetic complications. The patient understands and wishes to proceed with the procedure and has given their verbal consent. Written patient education information was given to the patient.   The patient will call if they have further questions before procedure.        Low-FODMAP Eating Plan    FODMAP stands for fermentable  oligosaccharides, disaccharides, monosaccharides, and polyols. These are sugars that are hard for some people to digest. A low-FODMAP eating plan may help some people who have irritable bowel syndrome (IBS) and certain other bowel (intestinal) diseases to manage their symptoms.  This meal plan can be complicated to follow. Work with a diet and nutrition specialist (dietitian) to make a low-FODMAP eating plan that is right for you. A dietitian can help make sure that you get enough nutrition from this diet.  What are tips for following this plan?  Reading food labels  Check labels for hidden FODMAPs such as:  High-fructose syrup.  Honey.  Agave.  Natural fruit flavors.  Onion or garlic powder.  Choose low-FODMAP foods that contain 3-4 grams of fiber per serving.  Check food labels for serving sizes. Eat only one serving at a time to make sure FODMAP levels stay low.  Shopping  Shop with a list of foods that are recommended on this diet and make a meal plan.  Meal planning  Follow a low-FODMAP eating plan for up to 6 weeks, or as told by your health care provider or dietitian.  To follow the eating plan:  Eliminate high-FODMAP foods from your diet completely. Choose only low-FODMAP foods to eat. You will do this for 2-6 weeks.  Gradually reintroduce high-FODMAP foods into your diet one at a time. Most people should wait a few days before introducing the next new high-FODMAP food into their meal plan. Your dietitian can recommend how quickly you may reintroduce foods.  Keep a daily record of what and how much you eat and drink. Make note of any symptoms that you have after eating.  Review your daily record with a dietitian regularly to identify which foods you can eat and which foods you should avoid.  General tips  Drink enough fluid each day to keep your urine pale yellow.  Avoid processed foods. These often have added sugar and may be high in FODMAPs.  Avoid most dairy products, whole grains, and sweeteners.  Work  "with a dietitian to make sure you get enough fiber in your diet.  Avoid high FODMAP foods at meals to manage symptoms.     Recommended foods  Fruits  Bananas, oranges, tangerines, yovany, limes, blueberries, raspberries, strawberries, grapes, cantaloupe, honeydew melon, kiwi, papaya, passion fruit, and pineapple. Limited amounts of dried cranberries, banana chips, and shredded coconut.  Vegetables  Eggplant, zucchini, cucumber, peppers, green beans, bean sprouts, lettuce, arugula, kale, Swiss chard, spinach, nataliya greens, bok chante, summer squash, potato, and tomato. Limited amounts of corn, carrot, and sweet potato. Green parts of scallions.  Grains  Gluten-free grains, such as rice, oats, buckwheat, quinoa, corn, polenta, and millet. Gluten-free pasta, bread, or cereal. Rice noodles. Corn tortillas.  Meats and other proteins  Unseasoned beef, pork, poultry, or fish. Eggs. Wheeler. Tofu (firm) and tempeh. Limited amounts of nuts and seeds, such as almonds, walnuts, brazil nuts, pecans, peanuts, nut butters, pumpkin seeds, rosa maria seeds, and sunflower seeds.  Dairy  Lactose-free milk, yogurt, and kefir. Lactose-free cottage cheese and ice cream. Non-dairy milks, such as almond, coconut, hemp, and rice milk. Non-dairy yogurt. Limited amounts of goat cheese, brie, mozzarella, parmesan, swiss, and other hard cheeses.  Fats and oils  Butter-free spreads. Vegetable oils, such as olive, canola, and sunflower oil.  Seasoning and other foods  Artificial sweeteners with names that do not end in \"ol,\" such as aspartame, saccharine, and stevia. Maple syrup, white table sugar, raw sugar, brown sugar, and molasses. Mayonnaise, soy sauce, and tamari. Fresh basil, coriander, parsley, rosemary, and thyme.  Beverages  Water and mineral water. Sugar-sweetened soft drinks. Small amounts of orange juice or cranberry juice. Black and green tea. Most dry luke. Coffee.  The items listed above may not be a complete list of foods and beverages " you can eat. Contact a dietitian for more information.     Foods to avoid  Fruits  Fresh, dried, and juiced forms of apple, pear, watermelon, peach, plum, cherries, apricots, blackberries, boysenberries, figs, nectarines, and raegan. Avocado.  Vegetables  Chicory root, artichoke, asparagus, cabbage, snow peas, [place] sprouts, broccoli, sugar snap peas, mushrooms, celery, and cauliflower. Onions, garlic, leeks, and the white part of scallions.  Grains  Wheat, including kamut, durum, and semolina. Barley and bulgur. Couscous. Wheat-based cereals. Wheat noodles, bread, crackers, and pastries.  Meats and other proteins  Fried or fatty meat. Sausage. Cashews and pistachios. Soybeans, baked beans, black beans, chickpeas, kidney beans, robbi beans, navy beans, lentils, black-eyed peas, and split peas.  Dairy  Milk, yogurt, ice cream, and soft cheese. Cream and sour cream. Milk-based sauces. Custard. Buttermilk. Soy milk.  Seasoning and other foods  Any sugar-free gum or candy. Foods that contain artificial sweeteners such as sorbitol, mannitol, isomalt, or xylitol. Foods that contain honey, high-fructose corn syrup, or agave. Bouillon, vegetable stock, beef stock, and chicken stock. Garlic and onion powder. Condiments made with onion, such as hummus, chutney, pickles, relish, salad dressing, and salsa. Tomato paste.  Beverages  Chicory-based drinks. Coffee substitutes. Chamomile tea. Fennel tea. Sweet or fortified luke such as port or kristi. Diet soft drinks made with isomalt, mannitol, maltitol, sorbitol, or xylitol. Apple, pear, and raegan juice. Juices with high-fructose corn syrup.  The items listed above may not be a complete list of foods and beverages you should avoid. Contact a dietitian for more information.     Summary  FODMAP stands for fermentable oligosaccharides, disaccharides, monosaccharides, and polyols. These are sugars that are hard for some people to digest.  A low-FODMAP eating plan is a short-term  diet that helps to ease symptoms of certain bowel diseases.  The eating plan usually lasts up to 6 weeks. After that, high-FODMAP foods are reintroduced gradually and one at a time. This can help you find out which foods may be causing symptoms.  A low-FODMAP eating plan can be complicated. It is best to work with a dietitian who has experience with this type of plan.  This information is not intended to replace advice given to you by your health care provider. Make sure you discuss any questions you have with your health care provider.  Document Revised: 05/06/2021 Document Reviewed: 05/06/2021  Elsevier Patient Education © 2023 Elsevier Inc.

## 2024-04-19 ENCOUNTER — TRANSCRIBE ORDERS (OUTPATIENT)
Dept: ADMINISTRATIVE | Facility: HOSPITAL | Age: 64
End: 2024-04-19
Payer: MEDICARE

## 2024-04-19 DIAGNOSIS — Z12.31 ENCOUNTER FOR SCREENING MAMMOGRAM FOR MALIGNANT NEOPLASM OF BREAST: Primary | ICD-10-CM

## 2024-04-24 ENCOUNTER — TELEPHONE (OUTPATIENT)
Dept: NEUROLOGY | Facility: CLINIC | Age: 64
End: 2024-04-24

## 2024-04-24 NOTE — TELEPHONE ENCOUNTER
"  Caller: Melita Sepulveda    Relationship: Self    Best call back number:   Telephone Information:   Mobile [phone]       Which medication are you concerned about: topiramate (Topamax) 25 MG tablet     What are your concerns: SHE STATES SHE WAS ADVISED CERTAIN DRINKS WOULD TASTE DIFFERENT WITH THIS MEDICATION, BUT PT STATES \"MY WATER TASTE LIKE SOAP\"  SHE STATES IT WAS UNOPENED BOTTLE OF DEERPARK. PLEASE REVIEW AND ADVISE         "

## 2024-04-24 NOTE — TELEPHONE ENCOUNTER
I am aware that Topamax can cause a metallic taste, however I am not sure about water tasting like soap.  Is this an expected side effect?

## 2024-04-25 DIAGNOSIS — R25.1 TREMOR: Primary | ICD-10-CM

## 2024-04-25 RX ORDER — PRIMIDONE 50 MG/1
100 TABLET ORAL NIGHTLY
Qty: 30 TABLET | Refills: 3 | Status: SHIPPED | OUTPATIENT
Start: 2024-04-25

## 2024-05-07 DIAGNOSIS — R25.1 TREMOR: Primary | ICD-10-CM

## 2024-05-07 DIAGNOSIS — R20.0 NUMBNESS AND TINGLING IN BOTH HANDS: ICD-10-CM

## 2024-05-07 DIAGNOSIS — R20.2 NUMBNESS AND TINGLING IN BOTH HANDS: ICD-10-CM

## 2024-05-25 DIAGNOSIS — N95.8 GENITOURINARY SYNDROME OF MENOPAUSE: ICD-10-CM

## 2024-05-28 RX ORDER — ESTRADIOL 0.1 MG/G
CREAM VAGINAL
Qty: 42.5 G | Refills: 3 | Status: SHIPPED | OUTPATIENT
Start: 2024-05-28

## 2024-06-07 ENCOUNTER — OFFICE VISIT (OUTPATIENT)
Dept: UROLOGY | Facility: CLINIC | Age: 64
End: 2024-06-07
Payer: MEDICARE

## 2024-06-07 VITALS
WEIGHT: 130 LBS | OXYGEN SATURATION: 97 % | DIASTOLIC BLOOD PRESSURE: 62 MMHG | BODY MASS INDEX: 22.2 KG/M2 | HEART RATE: 94 BPM | HEIGHT: 64 IN | SYSTOLIC BLOOD PRESSURE: 108 MMHG

## 2024-06-07 DIAGNOSIS — R31.29 HEMATURIA, MICROSCOPIC: ICD-10-CM

## 2024-06-07 DIAGNOSIS — N95.8 GENITOURINARY SYNDROME OF MENOPAUSE: ICD-10-CM

## 2024-06-07 DIAGNOSIS — N76.3 CHRONIC VULVITIS: Primary | ICD-10-CM

## 2024-06-07 LAB
BILIRUB BLD-MCNC: ABNORMAL MG/DL
CLARITY, POC: CLEAR
COLOR UR: ABNORMAL
EXPIRATION DATE: ABNORMAL
GLUCOSE UR STRIP-MCNC: NEGATIVE MG/DL
KETONES UR QL: NEGATIVE
LEUKOCYTE EST, POC: NEGATIVE
Lab: ABNORMAL
NITRITE UR-MCNC: NEGATIVE MG/ML
PH UR: 5.5 [PH] (ref 5–8)
PROT UR STRIP-MCNC: ABNORMAL MG/DL
RBC # UR STRIP: ABNORMAL /UL
SP GR UR: 1.03 (ref 1–1.03)
UROBILINOGEN UR QL: NORMAL

## 2024-06-07 RX ORDER — ALBUTEROL SULFATE 90 UG/1
AEROSOL, METERED RESPIRATORY (INHALATION)
COMMUNITY
Start: 2024-04-18

## 2024-06-07 RX ORDER — TRAMADOL HYDROCHLORIDE 50 MG/1
TABLET ORAL
COMMUNITY
Start: 2024-05-17

## 2024-06-07 RX ORDER — AMOXICILLIN 875 MG/1
TABLET, COATED ORAL
COMMUNITY
Start: 2024-04-18

## 2024-06-07 RX ORDER — ESTRADIOL 0.1 MG/G
CREAM VAGINAL
Qty: 42.5 G | Refills: 3 | Status: SHIPPED | OUTPATIENT
Start: 2024-06-07

## 2024-06-07 RX ORDER — IBUPROFEN 800 MG/1
TABLET ORAL
COMMUNITY
Start: 2024-05-15

## 2024-06-07 RX ORDER — METHYLPREDNISOLONE 4 MG/1
TABLET ORAL
COMMUNITY
Start: 2024-04-18

## 2024-06-07 RX ORDER — CLOBETASOL PROPIONATE 0.5 MG/G
CREAM TOPICAL
Qty: 30 G | Refills: 3 | Status: SHIPPED | OUTPATIENT
Start: 2024-06-07
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
## 2024-07-08 ENCOUNTER — OFFICE VISIT (OUTPATIENT)
Dept: NEUROLOGY | Facility: CLINIC | Age: 64
End: 2024-07-08
Payer: MEDICARE

## 2024-07-08 VITALS
WEIGHT: 128 LBS | HEIGHT: 64 IN | OXYGEN SATURATION: 99 % | HEART RATE: 92 BPM | DIASTOLIC BLOOD PRESSURE: 60 MMHG | SYSTOLIC BLOOD PRESSURE: 94 MMHG | BODY MASS INDEX: 21.85 KG/M2 | TEMPERATURE: 97.8 F

## 2024-07-08 DIAGNOSIS — R25.1 TREMOR: ICD-10-CM

## 2024-07-08 PROCEDURE — 3078F DIAST BP <80 MM HG: CPT | Performed by: NURSE PRACTITIONER

## 2024-07-08 PROCEDURE — 99214 OFFICE O/P EST MOD 30 MIN: CPT | Performed by: NURSE PRACTITIONER

## 2024-07-08 PROCEDURE — 1159F MED LIST DOCD IN RCRD: CPT | Performed by: NURSE PRACTITIONER

## 2024-07-08 PROCEDURE — 3074F SYST BP LT 130 MM HG: CPT | Performed by: NURSE PRACTITIONER

## 2024-07-08 PROCEDURE — 1160F RVW MEDS BY RX/DR IN RCRD: CPT | Performed by: NURSE PRACTITIONER

## 2024-07-08 RX ORDER — PRIMIDONE 50 MG/1
100 TABLET ORAL NIGHTLY
Qty: 30 TABLET | Refills: 2 | OUTPATIENT
Start: 2024-07-08

## 2024-07-08 RX ORDER — PRIMIDONE 50 MG/1
100 TABLET ORAL 2 TIMES DAILY
Qty: 120 TABLET | Refills: 5 | Status: SHIPPED | OUTPATIENT
Start: 2024-07-08

## 2024-07-08 NOTE — PROGRESS NOTES
Follow Up Office Visit      Patient Name: Melita Sepulveda  : 1960   MRN: [id number]     Chief Complaint:    Chief Complaint   Patient presents with    Follow-up     Tremor       History of Present Illness: Melita Sepulveda is a 64 y.o. female who is here today to follow up with tremors in her bilateral hands. She says she is tolerating the Primidone 100 mg at HS well. She is still having some daytime tremors she feels she would like to have better controlled.  She feels the 100 mg at night has helped some.  The tremors wax and wane and says Rt hand is worse than the left. She is right hand dominant. She feels the Lexapro has lessened some of the tremors versus the Celexa. She notes her tremors worsen with stress and anxiety. She says using utensils and her phone can be difficult at times  -She is under the care of of orthopedics at  and states they are wanting to a revision of her right thumb arthroplasty but Dr. Clifford told her she needed to quit smoking first.  She was initially referred for an abnormal EMG.     Pertinent Medical History: anxiety, depression, long QT, has Pacemaker/defibrillator, diverticulitis, CHF, MI   Her mother had tremors  She has stopped opiates a year ago and is now on Suboxone.  She still has chronic low back pain and had surgery 2020    Subjective      Review of Systems:   Review of Systems   Neurological:  Positive for tremors.       I have reviewed and the following portions of the patient's history were updated as appropriate: past family history, past medical history, past social history, past surgical history and problem list.    Medications:     Current Outpatient Medications:     atorvastatin (LIPITOR) 20 MG tablet, Take 1 tablet by mouth Daily., Disp: , Rfl:     buprenorphine-naloxone (SUBOXONE) 8-2 MG per SL tablet, Place 0.75 tablets under the tongue Daily., Disp: , Rfl:     clobetasol propionate (TEMOVATE) 0.05 % cream, Apply to  "affected area 2-3 times weekly as needed, Disp: 30 g, Rfl: 3    escitalopram (LEXAPRO) 10 MG tablet, , Disp: , Rfl:     estradiol (ESTRACE) 0.1 MG/GM vaginal cream, USE 0.5 GM VAGINALLY ON MONDAY, WEDNESDAY AND FRIDAY AT BEDTIME, Disp: 42.5 g, Rfl: 3    ibuprofen (ADVIL,MOTRIN) 800 MG tablet, , Disp: , Rfl:     O2 (OXYGEN), Inhale 2 L/min 1 (One) Time. Patient only uses as needed, Disp: , Rfl:     ondansetron ODT (ZOFRAN-ODT) 4 MG disintegrating tablet, Every 8 (Eight) Hours As Needed., Disp: , Rfl:     pantoprazole (PROTONIX) 40 MG EC tablet, Take 1 tablet by mouth Daily., Disp: , Rfl:     potassium chloride (K-DUR,KLOR-CON) 20 MEQ CR tablet, Take 1 tablet by mouth Daily., Disp: 90 tablet, Rfl: 0    primidone (MYSOLINE) 50 MG tablet, Take 2 tablets by mouth 2 (Two) Times a Day., Disp: 120 tablet, Rfl: 5    traMADol (ULTRAM) 50 MG tablet, , Disp: , Rfl:     Allergies:   Allergies   Allergen Reactions    Levaquin [Levofloxacin] Hives    Promethazine Other (See Comments) and Unknown - High Severity     seizure    Vancomycin Other (See Comments)     \"ed\" syndrome    Dicyclomine Rash and Confusion    Doxycycline GI Intolerance    Morphine And Codeine Nausea And Vomiting    Adhesive Tape Rash     Surgical dressing on pacemaker/defibrillator, can tolerate paper tape    Flonase [Fluticasone] Other (See Comments)     \"dries my nose out really bad\"    Morphine Nausea And Vomiting     With short-acting only    Sulfa Antibiotics Rash    Sulfamethoxazole-Trimethoprim Other (See Comments)    Wound Dressing Adhesive Rash       Objective     Physical Exam:  Vital Signs:   Vitals:    07/08/24 1029   BP: 94/60   Pulse: 92   Temp: 97.8 °F (36.6 °C)   SpO2: 99%   Weight: 58.1 kg (128 lb)   Height: 162.6 cm (64\")   PainSc:   8   PainLoc: Back     Body mass index is 21.97 kg/m².    Physical Exam  Constitutional:       Appearance: Normal appearance.   HENT:      Head: Normocephalic.   Eyes:      Conjunctiva/sclera: Conjunctivae " normal.   Pulmonary:      Effort: Pulmonary effort is normal.   Musculoskeletal:         General: Normal range of motion.   Skin:     General: Skin is warm and dry.   Neurological:      General: No focal deficit present.      Mental Status: She is alert and oriented to person, place, and time.      Cranial Nerves: Cranial nerves 2-12 are intact. No dysarthria.      Motor: Motor function is intact. Tremor present. No pronator drift.      Coordination: Coordination is intact.      Gait: Gait is intact.      Comments: Tremors to bilateral outstretched hands. Course on the right and more fine on the left   Psychiatric:         Attention and Perception: Attention normal.         Mood and Affect: Mood and affect normal.         Speech: Speech normal.         Behavior: Behavior normal. Behavior is cooperative.         Thought Content: Thought content normal.         Cognition and Memory: Cognition normal.         Judgment: Judgment normal.         Neurologic Exam     Mental Status   Oriented to person, place, and time.   Attention: normal. Concentration: normal.   Speech: speech is normal   Level of consciousness: alert  Knowledge: good.     Cranial Nerves   Cranial nerves II through XII intact.     Gait, Coordination, and Reflexes     Gait  Gait: normal       Assessment / Plan      Assessment/Plan:   Diagnoses and all orders for this visit:    1. Tremor  -     primidone (MYSOLINE) 50 MG tablet; Take 2 tablets by mouth 2 (Two) Times a Day.  Dispense: 120 tablet; Refill: 5         Will increase primidone to 100 mg twice daily.  Patient is aware that she is to start on 50 mg in the morning and 100 at night x 1 week before increasing to 100 mg twice daily. Indications and side effects discussed with patient she verbalizes understanding.  Patient will call in the interim if she has any questions or concerns or changes.    This note was dictated using Dragon voice recognition software.   Follow Up:   Return in about 6 months  (around 1/8/2025).    ARCENIO Goldberg, [facility] Neurology and Sleep Medicine

## 2024-07-26 ENCOUNTER — PRIOR AUTHORIZATION (OUTPATIENT)
Dept: GASTROENTEROLOGY | Facility: CLINIC | Age: 64
End: 2024-07-26
Payer: MEDICARE

## 2024-08-01 ENCOUNTER — HOSPITAL ENCOUNTER (OUTPATIENT)
Dept: MAMMOGRAPHY | Facility: HOSPITAL | Age: 64
Discharge: HOME | End: 2024-08-01
Admitting: NURSE PRACTITIONER
Payer: MEDICARE

## 2024-08-01 DIAGNOSIS — Z12.31 ENCOUNTER FOR SCREENING MAMMOGRAM FOR MALIGNANT NEOPLASM OF BREAST: ICD-10-CM

## 2024-08-01 PROCEDURE — 77063 BREAST TOMOSYNTHESIS BI: CPT

## 2024-08-01 PROCEDURE — 77067 SCR MAMMO BI INCL CAD: CPT

## 2024-08-06 PROBLEM — D12.6 ADENOMATOUS POLYP OF COLON: Status: ACTIVE | Noted: 2024-04-18

## 2024-08-16 ENCOUNTER — TELEPHONE (OUTPATIENT)
Dept: GASTROENTEROLOGY | Facility: CLINIC | Age: 64
End: 2024-08-16
Payer: MEDICARE

## 2024-08-16 NOTE — TELEPHONE ENCOUNTER
----- Message from Radha DALTON sent at 8/13/2024  2:47 PM EDT -----  Regarding: FW: No show    ----- Message -----  From: Rashi Hitchcock RegSched Rep  Sent: 8/13/2024  11:50 AM EDT  To: Radha Paul MA  Subject: No show                                          Patient did not show for procedure today, 08/13/24.

## 2024-09-16 ENCOUNTER — HOSPITAL ENCOUNTER (OUTPATIENT)
Facility: HOSPITAL | Age: 64
Setting detail: HOSPITAL OUTPATIENT SURGERY
Discharge: HOME | End: 2024-09-16
Attending: INTERNAL MEDICINE | Admitting: INTERNAL MEDICINE
Payer: MEDICARE

## 2024-09-16 ENCOUNTER — ANESTHESIA EVENT (OUTPATIENT)
Dept: GASTROENTEROLOGY | Facility: HOSPITAL | Age: 64
End: 2024-09-16
Payer: MEDICARE

## 2024-09-16 ENCOUNTER — ANESTHESIA (OUTPATIENT)
Dept: GASTROENTEROLOGY | Facility: HOSPITAL | Age: 64
End: 2024-09-16
Payer: MEDICARE

## 2024-09-16 VITALS
OXYGEN SATURATION: 97 % | BODY MASS INDEX: 21.34 KG/M2 | WEIGHT: 125 LBS | DIASTOLIC BLOOD PRESSURE: 69 MMHG | TEMPERATURE: 97.6 F | SYSTOLIC BLOOD PRESSURE: 99 MMHG | HEART RATE: 80 BPM | HEIGHT: 64 IN | RESPIRATION RATE: 16 BRPM

## 2024-09-16 DIAGNOSIS — Z80.0 FAMILY HISTORY OF COLON CANCER: ICD-10-CM

## 2024-09-16 DIAGNOSIS — D12.6 ADENOMATOUS POLYP OF COLON, UNSPECIFIED PART OF COLON: ICD-10-CM

## 2024-09-16 PROCEDURE — 25010000002 FENTANYL CITRATE (PF) 50 MCG/ML SOLUTION PREFILLED SYRINGE: Performed by: NURSE ANESTHETIST, CERTIFIED REGISTERED

## 2024-09-16 PROCEDURE — 45385 COLONOSCOPY W/LESION REMOVAL: CPT | Performed by: INTERNAL MEDICINE

## 2024-09-16 PROCEDURE — 88305 TISSUE EXAM BY PATHOLOGIST: CPT

## 2024-09-16 PROCEDURE — 25010000002 PROPOFOL 10 MG/ML EMULSION: Performed by: NURSE ANESTHETIST, CERTIFIED REGISTERED

## 2024-09-16 PROCEDURE — 25810000003 SODIUM CHLORIDE 0.9 % SOLUTION: Performed by: NURSE PRACTITIONER

## 2024-09-16 RX ORDER — SIMETHICONE 40MG/0.6ML
SUSPENSION, DROPS(FINAL DOSAGE FORM)(ML) ORAL AS NEEDED
Status: DISCONTINUED | OUTPATIENT
Start: 2024-09-16 | End: 2024-09-16 | Stop reason: HOSPADM

## 2024-09-16 RX ORDER — SODIUM CHLORIDE 9 MG/ML
70 INJECTION, SOLUTION INTRAVENOUS CONTINUOUS PRN
Status: DISCONTINUED | OUTPATIENT
Start: 2024-09-16 | End: 2024-09-16 | Stop reason: HOSPADM

## 2024-09-16 RX ORDER — PROPOFOL 10 MG/ML
VIAL (ML) INTRAVENOUS AS NEEDED
Status: DISCONTINUED | OUTPATIENT
Start: 2024-09-16 | End: 2024-09-16 | Stop reason: SURG

## 2024-09-16 RX ORDER — FENTANYL CITRATE 50 UG/ML
INJECTION, SOLUTION INTRAMUSCULAR; INTRAVENOUS AS NEEDED
Status: DISCONTINUED | OUTPATIENT
Start: 2024-09-16 | End: 2024-09-16 | Stop reason: SURG

## 2024-09-16 RX ORDER — LIDOCAINE HYDROCHLORIDE 20 MG/ML
INJECTION, SOLUTION EPIDURAL; INFILTRATION; INTRACAUDAL; PERINEURAL AS NEEDED
Status: DISCONTINUED | OUTPATIENT
Start: 2024-09-16 | End: 2024-09-16 | Stop reason: SURG

## 2024-09-16 RX ADMIN — LIDOCAINE HYDROCHLORIDE 50 MG: 20 INJECTION, SOLUTION EPIDURAL; INFILTRATION; INTRACAUDAL; PERINEURAL at 09:53

## 2024-09-16 RX ADMIN — SODIUM CHLORIDE 70 ML/HR: 9 INJECTION, SOLUTION INTRAVENOUS at 09:01

## 2024-09-16 RX ADMIN — FENTANYL CITRATE 50 MCG: 50 INJECTION, SOLUTION INTRAMUSCULAR; INTRAVENOUS at 09:53

## 2024-09-16 RX ADMIN — PROPOFOL 100 MG: 10 INJECTION, EMULSION INTRAVENOUS at 09:53

## 2024-09-16 RX ADMIN — PROPOFOL 100 MG: 10 INJECTION, EMULSION INTRAVENOUS at 10:05

## 2024-09-17 LAB — REF LAB TEST METHOD: NORMAL

## 2024-10-24 ENCOUNTER — OFFICE VISIT (OUTPATIENT)
Dept: CARDIOLOGY | Facility: CLINIC | Age: 64
End: 2024-10-24
Payer: MEDICARE

## 2024-10-24 VITALS
HEIGHT: 64 IN | OXYGEN SATURATION: 93 % | WEIGHT: 125.4 LBS | BODY MASS INDEX: 21.41 KG/M2 | SYSTOLIC BLOOD PRESSURE: 98 MMHG | DIASTOLIC BLOOD PRESSURE: 64 MMHG | HEART RATE: 84 BPM

## 2024-10-24 DIAGNOSIS — I45.81 LONG Q-T SYNDROME: Primary | ICD-10-CM

## 2024-10-24 DIAGNOSIS — I42.9 CARDIOMYOPATHY, UNSPECIFIED TYPE: ICD-10-CM

## 2024-10-24 DIAGNOSIS — I95.1 AUTONOMIC ORTHOSTATIC HYPOTENSION: ICD-10-CM

## 2024-10-24 RX ORDER — MIDODRINE HYDROCHLORIDE 5 MG/1
5 TABLET ORAL 2 TIMES DAILY
Qty: 180 TABLET | Refills: 3 | Status: SHIPPED | OUTPATIENT
Start: 2024-10-24
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
## 2024-10-25 ENCOUNTER — PATIENT ROUNDING (BHMG ONLY) (OUTPATIENT)
Dept: CARDIOLOGY | Facility: CLINIC | Age: 64
End: 2024-10-25
Payer: MEDICARE

## 2024-10-25 NOTE — PROGRESS NOTES
[facility] Cardiology welcome email and rounding message has been sent to patient via [employer].

## 2024-11-05 ENCOUNTER — TELEPHONE (OUTPATIENT)
Dept: CARDIOLOGY | Facility: CLINIC | Age: 64
End: 2024-11-05
Payer: MEDICARE

## 2024-11-05 NOTE — TELEPHONE ENCOUNTER
Pt called stating that she is going to have a sublocaid injection tomorrow 11/6/24 and her provider wanted to make sure this was okay prior.

## 2024-12-10 ENCOUNTER — OFFICE VISIT (OUTPATIENT)
Dept: GASTROENTEROLOGY | Facility: CLINIC | Age: 64
End: 2024-12-10
Payer: MEDICARE

## 2024-12-10 VITALS
WEIGHT: 125 LBS | DIASTOLIC BLOOD PRESSURE: 60 MMHG | SYSTOLIC BLOOD PRESSURE: 98 MMHG | OXYGEN SATURATION: 88 % | BODY MASS INDEX: 21.46 KG/M2 | HEART RATE: 90 BPM

## 2024-12-10 DIAGNOSIS — T40.2X5A THERAPEUTIC OPIOID INDUCED CONSTIPATION: Chronic | ICD-10-CM

## 2024-12-10 DIAGNOSIS — Z80.0 FAMILY HISTORY OF COLON CANCER: ICD-10-CM

## 2024-12-10 DIAGNOSIS — K21.9 GASTROESOPHAGEAL REFLUX DISEASE WITHOUT ESOPHAGITIS: Chronic | ICD-10-CM

## 2024-12-10 DIAGNOSIS — D12.6 ADENOMATOUS POLYP OF COLON, UNSPECIFIED PART OF COLON: ICD-10-CM

## 2024-12-10 DIAGNOSIS — K59.03 THERAPEUTIC OPIOID INDUCED CONSTIPATION: Chronic | ICD-10-CM

## 2024-12-10 DIAGNOSIS — K92.9 FUNCTIONAL GASTROINTESTINAL DISORDER: Primary | Chronic | ICD-10-CM

## 2024-12-10 PROCEDURE — 1160F RVW MEDS BY RX/DR IN RCRD: CPT | Performed by: NURSE PRACTITIONER

## 2024-12-10 PROCEDURE — 1159F MED LIST DOCD IN RCRD: CPT | Performed by: NURSE PRACTITIONER

## 2024-12-10 PROCEDURE — 99214 OFFICE O/P EST MOD 30 MIN: CPT | Performed by: NURSE PRACTITIONER

## 2024-12-10 PROCEDURE — 3078F DIAST BP <80 MM HG: CPT | Performed by: NURSE PRACTITIONER

## 2024-12-10 PROCEDURE — 3074F SYST BP LT 130 MM HG: CPT | Performed by: NURSE PRACTITIONER

## 2024-12-10 NOTE — PATIENT INSTRUCTIONS
Antireflux measures: Avoid fried, fatty foods, alcohol, chocolate, coffee, tea,  soft drinks, all carbonated beverages (including sparkling water), peppermint and spearmint, spicy foods, tomatoes and tomato based foods, onions, peppers, and garlic.   Other antireflux measures include weight reduction if overweight, avoiding tight clothing around the abdomen, elevating the head of the bed 6 inches with blocks under the head board, and don't drink or eat before going to bed and avoid lying down immediately after meals.  Avoid vaping/smoking/marijuana/marijuana THC gummies/CBD/etc.  Pantoprazole 40 mg 1 by mouth in the am 30 minutes before breakfast.  Zofran 4 mg 1 po every 8 hours as needed for nausea.  The patient should eat 4-5 very small meals throughout the day. Avoid large meals.  It is recommended to eat a softer diet. Meats are best consumed ground. Fruits and vegetables are best consumed cooked or steamed and then mashed.   Low fiber, low fat diet with liberal water intake. May use soluble fiber such as Metamucil daily.   Advised to chew food very well and take sips of water between bites.  Relistor 150 mg 3 tablets by mouth once per day.   May take Miralax 17 grams daily.  May take stool softeners 2 per day as needed.  May take Senna laxative once a day as needed for constipation.   Metamucil 1 packet daily or fiber gummies 2-4 per day.   Low FODMAP diet - avoid all dairy. May use lactose free/dairy free alternatives such as almond milk, rice milk, oat milk, etc.   Colonoscopy for high risk surveillance in 3 years, September 2027.  Follow up: 6 months        Low-FODMAP Eating Plan    FODMAP stands for fermentable oligosaccharides, disaccharides, monosaccharides, and polyols. These are sugars that are hard for some people to digest. A low-FODMAP eating plan may help some people who have irritable bowel syndrome (IBS) and certain other bowel (intestinal) diseases to manage their symptoms.  This meal plan can be  complicated to follow. Work with a diet and nutrition specialist (dietitian) to make a low-FODMAP eating plan that is right for you. A dietitian can help make sure that you get enough nutrition from this diet.  What are tips for following this plan?  Reading food labels  Check labels for hidden FODMAPs such as:  High-fructose syrup.  Honey.  Agave.  Natural fruit flavors.  Onion or garlic powder.  Choose low-FODMAP foods that contain 3-4 grams of fiber per serving.  Check food labels for serving sizes. Eat only one serving at a time to make sure FODMAP levels stay low.  Shopping  Shop with a list of foods that are recommended on this diet and make a meal plan.  Meal planning  Follow a low-FODMAP eating plan for up to 6 weeks, or as told by your health care provider or dietitian.  To follow the eating plan:  Eliminate high-FODMAP foods from your diet completely. Choose only low-FODMAP foods to eat. You will do this for 2-6 weeks.  Gradually reintroduce high-FODMAP foods into your diet one at a time. Most people should wait a few days before introducing the next new high-FODMAP food into their meal plan. Your dietitian can recommend how quickly you may reintroduce foods.  Keep a daily record of what and how much you eat and drink. Make note of any symptoms that you have after eating.  Review your daily record with a dietitian regularly to identify which foods you can eat and which foods you should avoid.  General tips  Drink enough fluid each day to keep your urine pale yellow.  Avoid processed foods. These often have added sugar and may be high in FODMAPs.  Avoid most dairy products, whole grains, and sweeteners.  Work with a dietitian to make sure you get enough fiber in your diet.  Avoid high FODMAP foods at meals to manage symptoms.     Recommended foods  Fruits  Bananas, oranges, tangerines, yovany, limes, blueberries, raspberries, strawberries, grapes, cantaloupe, honeydew melon, kiwi, papaya, passion fruit, and  "pineapple. Limited amounts of dried cranberries, banana chips, and shredded coconut.  Vegetables  Eggplant, zucchini, cucumber, peppers, green beans, bean sprouts, lettuce, arugula, kale, Swiss chard, spinach, nataliya greens, bok chante, summer squash, potato, and tomato. Limited amounts of corn, carrot, and sweet potato. Green parts of scallions.  Grains  Gluten-free grains, such as rice, oats, buckwheat, quinoa, corn, polenta, and millet. Gluten-free pasta, bread, or cereal. Rice noodles. Corn tortillas.  Meats and other proteins  Unseasoned beef, pork, poultry, or fish. Eggs. Wheeler. Tofu (firm) and tempeh. Limited amounts of nuts and seeds, such as almonds, walnuts, brazil nuts, pecans, peanuts, nut butters, pumpkin seeds, rosa maria seeds, and sunflower seeds.  Dairy  Lactose-free milk, yogurt, and kefir. Lactose-free cottage cheese and ice cream. Non-dairy milks, such as almond, coconut, hemp, and rice milk. Non-dairy yogurt. Limited amounts of goat cheese, brie, mozzarella, parmesan, swiss, and other hard cheeses.  Fats and oils  Butter-free spreads. Vegetable oils, such as olive, canola, and sunflower oil.  Seasoning and other foods  Artificial sweeteners with names that do not end in \"ol,\" such as aspartame, saccharine, and stevia. Maple syrup, white table sugar, raw sugar, brown sugar, and molasses. Mayonnaise, soy sauce, and tamari. Fresh basil, coriander, parsley, rosemary, and thyme.  Beverages  Water and mineral water. Sugar-sweetened soft drinks. Small amounts of orange juice or cranberry juice. Black and green tea. Most dry luke. Coffee.  The items listed above may not be a complete list of foods and beverages you can eat. Contact a dietitian for more information.     Foods to avoid  Fruits  Fresh, dried, and juiced forms of apple, pear, watermelon, peach, plum, cherries, apricots, blackberries, boysenberries, figs, nectarines, and raegan. Avocado.  Vegetables  Chicory root, artichoke, asparagus, cabbage, " snow peas, [place] sprouts, broccoli, sugar snap peas, mushrooms, celery, and cauliflower. Onions, garlic, leeks, and the white part of scallions.  Grains  Wheat, including kamut, durum, and semolina. Barley and bulgur. Couscous. Wheat-based cereals. Wheat noodles, bread, crackers, and pastries.  Meats and other proteins  Fried or fatty meat. Sausage. Cashews and pistachios. Soybeans, baked beans, black beans, chickpeas, kidney beans, robbi beans, navy beans, lentils, black-eyed peas, and split peas.  Dairy  Milk, yogurt, ice cream, and soft cheese. Cream and sour cream. Milk-based sauces. Custard. Buttermilk. Soy milk.  Seasoning and other foods  Any sugar-free gum or candy. Foods that contain artificial sweeteners such as sorbitol, mannitol, isomalt, or xylitol. Foods that contain honey, high-fructose corn syrup, or agave. Bouillon, vegetable stock, beef stock, and chicken stock. Garlic and onion powder. Condiments made with onion, such as hummus, chutney, pickles, relish, salad dressing, and salsa. Tomato paste.  Beverages  Chicory-based drinks. Coffee substitutes. Chamomile tea. Fennel tea. Sweet or fortified luke such as port or kristi. Diet soft drinks made with isomalt, mannitol, maltitol, sorbitol, or xylitol. Apple, pear, and raegan juice. Juices with high-fructose corn syrup.  The items listed above may not be a complete list of foods and beverages you should avoid. Contact a dietitian for more information.     Summary  FODMAP stands for fermentable oligosaccharides, disaccharides, monosaccharides, and polyols. These are sugars that are hard for some people to digest.  A low-FODMAP eating plan is a short-term diet that helps to ease symptoms of certain bowel diseases.  The eating plan usually lasts up to 6 weeks. After that, high-FODMAP foods are reintroduced gradually and one at a time. This can help you find out which foods may be causing symptoms.  A low-FODMAP eating plan can be complicated. It is best  to work with a dietitian who has experience with this type of plan.  This information is not intended to replace advice given to you by your health care provider. Make sure you discuss any questions you have with your health care provider.  Document Revised: 05/06/2021 Document Reviewed: 05/06/2021  Elsevier Patient Education © 2023 [employer] Inc.

## 2025-01-16 ENCOUNTER — OFFICE VISIT (OUTPATIENT)
Dept: NEUROLOGY | Facility: CLINIC | Age: 65
End: 2025-01-16
Payer: MEDICARE

## 2025-01-16 VITALS
SYSTOLIC BLOOD PRESSURE: 100 MMHG | TEMPERATURE: 97.6 F | WEIGHT: 122 LBS | HEART RATE: 78 BPM | BODY MASS INDEX: 20.83 KG/M2 | DIASTOLIC BLOOD PRESSURE: 60 MMHG | HEIGHT: 64 IN

## 2025-01-16 DIAGNOSIS — R25.1 TREMOR: ICD-10-CM

## 2025-01-16 RX ORDER — BUPRENORPHINE 300 MG/1
SOLUTION SUBCUTANEOUS
COMMUNITY
Start: 2025-01-02

## 2025-01-16 RX ORDER — TRAZODONE HYDROCHLORIDE 100 MG/1
200 TABLET ORAL NIGHTLY
COMMUNITY

## 2025-01-16 RX ORDER — BUPRENORPHINE 2 MG/1
2 TABLET SUBLINGUAL DAILY
COMMUNITY

## 2025-01-16 RX ORDER — PRIMIDONE 50 MG/1
100 TABLET ORAL 2 TIMES DAILY
Qty: 120 TABLET | Refills: 5 | Status: SHIPPED | OUTPATIENT
Start: 2025-01-16

## 2025-01-16 NOTE — PROGRESS NOTES
Follow Up Office Visit      Patient Name: Melita Sepulveda  : 1960   MRN: [id number]     Chief Complaint:    Chief Complaint   Patient presents with    Tremors       History of Present Illness: Melita Sepulveda is a 64 y.o. female who is here today to follow up with tremors in her hands. She is on Primidone and this was increased last office visit to 100mg BID. She feels it has by 50%. The right hand is worse than the left. There are some days better than others. Her mother has tremors in her hands.   She is under care of Ortho and says she has to have myelogram they feel that she may need to have repeat lumbar surgery.    Pertinent Medical History: anxiety, depression, long QT, has Pacemaker/defibrillator, diverticulitis, CHF, MI   Her mother had tremors  She has stopped opiates a year ago and is now on Suboxone.  She still has chronic low back pain and had surgery 2020    Subjective      Review of Systems:   Review of Systems   Musculoskeletal:  Positive for back pain.   Neurological:  Positive for tremors.       I have reviewed and the following portions of the patient's history were updated as appropriate: past family history, past medical history, past social history, past surgical history and problem list.    Medications:     Current Outpatient Medications:     atorvastatin (LIPITOR) 20 MG tablet, Take 1 tablet by mouth Daily., Disp: , Rfl:     buprenorphine (SUBUTEX) 2 MG sublingual tablet SL tablet, Place 1 tablet under the tongue Daily., Disp: , Rfl:     clobetasol propionate (TEMOVATE) 0.05 % cream, Apply to affected area 2-3 times weekly as needed, Disp: 30 g, Rfl: 3    escitalopram (LEXAPRO) 10 MG tablet, , Disp: , Rfl:     estradiol (ESTRACE) 0.1 MG/GM vaginal cream, USE 0.5 GM VAGINALLY ON MONDAY, WEDNESDAY AND FRIDAY AT BEDTIME, Disp: 42.5 g, Rfl: 3    ibuprofen (ADVIL,MOTRIN) 800 MG tablet, Take  by mouth As Needed., Disp: , Rfl:     Naloxegol Oxalate (MOVANTIK  "PO), Take  by mouth., Disp: , Rfl:     O2 (OXYGEN), Inhale 2 L/min 1 (One) Time. Patient only uses as needed, Disp: , Rfl:     ondansetron ODT (ZOFRAN-ODT) 4 MG disintegrating tablet, Every 8 (Eight) Hours As Needed., Disp: , Rfl:     pantoprazole (PROTONIX) 40 MG EC tablet, Take 1 tablet by mouth Daily., Disp: , Rfl:     potassium chloride (K-DUR,KLOR-CON) 20 MEQ CR tablet, Take 1 tablet by mouth Daily., Disp: 90 tablet, Rfl: 0    primidone (MYSOLINE) 50 MG tablet, Take 2 tablets by mouth 2 (Two) Times a Day., Disp: 120 tablet, Rfl: 5    Sublocade 300 MG/1.5ML solution prefilled syringe, , Disp: , Rfl:     traZODone (DESYREL) 100 MG tablet, Take 2 tablets by mouth Every Night., Disp: , Rfl:     Allergies:   Allergies   Allergen Reactions    Levaquin [Levofloxacin] Hives    Promethazine Other (See Comments) and Unknown - High Severity     seizure    Vancomycin Other (See Comments)     \"ed\" syndrome    Dicyclomine Rash and Confusion    Doxycycline GI Intolerance    Morphine And Codeine Nausea And Vomiting    Adhesive Tape Rash     Surgical dressing on pacemaker/defibrillator, can tolerate paper tape    Flonase [Fluticasone] Other (See Comments)     \"dries my nose out really bad\"    Morphine Nausea And Vomiting     With short-acting only    Sulfa Antibiotics Rash    Sulfamethoxazole-Trimethoprim Other (See Comments)    Wound Dressing Adhesive Rash       Objective     Physical Exam:  Vital Signs:   Vitals:    01/16/25 0804   BP: 100/60   Pulse: 78   Temp: 97.6 °F (36.4 °C)   Weight: 55.3 kg (122 lb)   Height: 162.6 cm (64\")   PainSc:   7   PainLoc: Back     Body mass index is 20.94 kg/m².    Physical Exam  Constitutional:       Appearance: Normal appearance.   HENT:      Head: Normocephalic.   Eyes:      Conjunctiva/sclera: Conjunctivae normal.   Pulmonary:      Effort: Pulmonary effort is normal.   Musculoskeletal:         General: Normal range of motion.   Skin:     General: Skin is warm and dry.   Neurological: "      General: No focal deficit present.      Mental Status: She is alert and oriented to person, place, and time.      Cranial Nerves: Cranial nerves 2-12 are intact. No dysarthria.      Motor: Motor function is intact. Tremor present.      Coordination: Coordination is intact.      Gait: Gait is intact.      Comments: Fine to coarse tremors of her bilateral outstretched hands   Psychiatric:         Attention and Perception: Attention normal.         Mood and Affect: Mood and affect normal.         Speech: Speech normal.         Behavior: Behavior normal. Behavior is cooperative.         Thought Content: Thought content normal.         Cognition and Memory: Cognition normal.         Judgment: Judgment normal.         Neurological Exam  Mental Status  Alert. Oriented to person, place, time and situation. Oriented to person, place, and time. Recent and remote memory are intact. Speech is normal. no dysarthria present. Language is fluent with no aphasia. Attention and concentration are normal.    Coordination    Finger-to-nose, rapid alternating movements and heel-to-shin normal bilaterally without dysmetria. Tremor.    Gait   Normal gait.      Assessment / Plan      Assessment/Plan:   Diagnoses and all orders for this visit:    1. Tremor  -     primidone (MYSOLINE) 50 MG tablet; Take 2 tablets by mouth 2 (Two) Times a Day.  Dispense: 120 tablet; Refill: 5       This is a pleasant 64-year-old female patient here to follow-up with tremors.  She continues to see improvement since she was changed from Celexa to Lexapro as well as increasing her primidone dose last office visit to twice daily dosing.  Primidone 100 mg twice daily refilled x 6 months without change.  Indications and side effects discussed with patient she verbalizes understanding.  Patient will call in the interim if she has any questions or concerns or changes.    Follow Up:   Return in about 6 months (around 7/16/2025).    ARCENIO Goldberg,  FNP-[facility]

## 2025-02-12 ENCOUNTER — TRANSCRIBE ORDERS (OUTPATIENT)
Dept: ADMINISTRATIVE | Facility: HOSPITAL | Age: 65
End: 2025-02-12
Payer: MEDICARE

## 2025-02-12 DIAGNOSIS — Z78.0 POSTMENOPAUSAL: ICD-10-CM

## 2025-02-12 DIAGNOSIS — Z87.39 HISTORY OF OSTEOPENIA: Primary | ICD-10-CM

## 2025-02-12 DIAGNOSIS — E04.1 THYROID NODULE: ICD-10-CM

## 2025-02-17 ENCOUNTER — TRANSCRIBE ORDERS (OUTPATIENT)
Dept: ADMINISTRATIVE | Facility: HOSPITAL | Age: 65
End: 2025-02-17
Payer: MEDICARE

## 2025-02-17 DIAGNOSIS — R06.02 SHORTNESS OF BREATH: ICD-10-CM

## 2025-02-17 DIAGNOSIS — R42 DIZZINESS: Primary | ICD-10-CM

## 2025-02-28 ENCOUNTER — HOSPITAL ENCOUNTER (OUTPATIENT)
Dept: PULMONOLOGY | Facility: HOSPITAL | Age: 65
Discharge: HOME | End: 2025-02-28
Payer: MEDICARE

## 2025-02-28 DIAGNOSIS — R06.02 SHORTNESS OF BREATH: ICD-10-CM

## 2025-02-28 PROCEDURE — 94729 DIFFUSING CAPACITY: CPT

## 2025-02-28 PROCEDURE — 94726 PLETHYSMOGRAPHY LUNG VOLUMES: CPT

## 2025-02-28 PROCEDURE — 94010 BREATHING CAPACITY TEST: CPT

## 2025-04-08 ENCOUNTER — TELEPHONE (OUTPATIENT)
Dept: CARDIOLOGY | Facility: CLINIC | Age: 65
End: 2025-04-08
Payer: MEDICARE

## 2025-04-08 NOTE — TELEPHONE ENCOUNTER
Caller: Melita Sepulveda     Relationship: SELF  Best call back number: [phone]    What is your medical concern? LOW BP 90/60 / DIZZINESS    How long has this issue been going on?  LONG TIME    Is your provider already aware of this issue?  NOT THAT IT NOT IMPROVING WITH MEDS.    Have you been treated for this issue?  NO    PATIENT WOULD LIKE TO COME IN TO SEE DR. CERON SOONCATALINO THEN HER JUNE 3RD APPOINTMENT. PCP TOLD PATIENT TO GET IN TO SEE DR. CERON SOONER IF POSSIBLE. PATIENT WOULD LIKE TO COME IN EARLY MORNING AROUND 9 IF POSSIBLE.

## 2025-04-09 ENCOUNTER — OFFICE VISIT (OUTPATIENT)
Dept: CARDIOLOGY | Facility: CLINIC | Age: 65
End: 2025-04-09
Payer: MEDICARE

## 2025-04-09 VITALS
HEART RATE: 82 BPM | SYSTOLIC BLOOD PRESSURE: 72 MMHG | OXYGEN SATURATION: 95 % | HEIGHT: 64 IN | BODY MASS INDEX: 20.14 KG/M2 | DIASTOLIC BLOOD PRESSURE: 50 MMHG | WEIGHT: 118 LBS

## 2025-04-09 DIAGNOSIS — I45.81 LONG Q-T SYNDROME: Primary | ICD-10-CM

## 2025-04-09 DIAGNOSIS — I42.9 CARDIOMYOPATHY, UNSPECIFIED TYPE: ICD-10-CM

## 2025-04-09 DIAGNOSIS — I95.1 AUTONOMIC ORTHOSTATIC HYPOTENSION: ICD-10-CM

## 2025-04-09 PROCEDURE — 99214 OFFICE O/P EST MOD 30 MIN: CPT | Performed by: INTERNAL MEDICINE

## 2025-04-09 PROCEDURE — 3074F SYST BP LT 130 MM HG: CPT | Performed by: INTERNAL MEDICINE

## 2025-04-09 PROCEDURE — 3078F DIAST BP <80 MM HG: CPT | Performed by: INTERNAL MEDICINE

## 2025-04-09 RX ORDER — ALBUTEROL SULFATE 90 UG/1
INHALANT RESPIRATORY (INHALATION) SEE ADMIN INSTRUCTIONS
COMMUNITY
Start: 2025-01-30

## 2025-04-09 RX ORDER — TRIAMCINOLONE ACETONIDE 1 MG/G
CREAM TOPICAL
COMMUNITY
Start: 2025-02-03

## 2025-04-09 RX ORDER — LIDOCAINE 50 MG/G
PATCH TOPICAL
COMMUNITY
Start: 2025-03-09

## 2025-04-09 RX ORDER — BUDESONIDE, GLYCOPYRROLATE, AND FORMOTEROL FUMARATE 160; 9; 4.8 UG/1; UG/1; UG/1
AEROSOL, METERED RESPIRATORY (INHALATION)
COMMUNITY
Start: 2025-01-07

## 2025-04-09 RX ORDER — TIOTROPIUM BROMIDE AND OLODATEROL 3.124; 2.736 UG/1; UG/1
SPRAY, METERED RESPIRATORY (INHALATION)
COMMUNITY
Start: 2025-03-17

## 2025-04-09 RX ORDER — MIDODRINE HYDROCHLORIDE 10 MG/1
10 TABLET ORAL
Qty: 270 TABLET | Refills: 2 | Status: SHIPPED | OUTPATIENT
Start: 2025-04-09

## 2025-04-09 RX ORDER — BUPRENORPHINE HYDROCHLORIDE AND NALOXONE HYDROCHLORIDE DIHYDRATE 8; 2 MG/1; MG/1
TABLET SUBLINGUAL
COMMUNITY
Start: 2025-03-14

## 2025-04-09 RX ORDER — MIDODRINE HYDROCHLORIDE 10 MG/1
5 TABLET ORAL
COMMUNITY
End: 2025-04-09 | Stop reason: SDUPTHER

## 2025-04-09 NOTE — PROGRESS NOTES
[facility] Cardiology Office Follow Up Note    Melita Sepulveda  [id number]  2025    Primary Care Provider: Fadumo Zuñiga APRN    Chief Complaint: Routine follow-up    History of Present Illness:   Mrs. Melita Sepulveda is a 64 y.o. female who presents to the Cardiology Clinic for routine follow-up.  She has a past medical history significant for hypertension, hypokalemia, COPD, and chronic tobacco use.  She has a past cardiac history significant for long QT syndrome, for which she has a defibrillator in place.  She also has a history of nonischemic cardiomyopathy, with an LVEF 40-50%.  Her last appointment, the patient had a generator generator change at [facility] which was uncomplicated.  Today, she reports persistent episodes of dizziness and lightheadedness associated with periods of hypotension.  The patient reports that she did not have any significant improvement in her blood pressure after starting midodrine 5 mg twice daily.  No presyncopal or syncopal events.  No other specific complaints today.     Past Cardiac Testin. Last Coronary Angio:               1.  No obstructive CAD  2. Prior Stress Testing: MPS               1.  No evidence of inducible ischemia  3. Last Echo:               1.  LVEF 40-50%              2.  No significant valvular abnormalities  4. Prior Holter Monitor: Remote, records unavailable    Review of Systems:   Review of Systems   Constitutional:  Negative for activity change, appetite change, chills, diaphoresis, fatigue, fever, unexpected weight gain and unexpected weight loss.   Eyes:  Negative for blurred vision and double vision.   Respiratory:  Negative for cough, chest tightness, shortness of breath and wheezing.    Cardiovascular:  Negative for chest pain, palpitations and leg swelling.   Gastrointestinal:  Negative for abdominal pain, anal bleeding, blood in stool and GERD.   Endocrine: Negative  for cold intolerance and heat intolerance.   Genitourinary:  Negative for hematuria.   Neurological:  Positive for dizziness and light-headedness. Negative for syncope and weakness.   Hematological:  Does not bruise/bleed easily.   Psychiatric/Behavioral:  Negative for depressed mood and stress. The patient is not nervous/anxious.        I have reviewed and/or updated the patient's past medical, past surgical, family, social history, problem list and allergies as appropriate.     Medications:     Current Outpatient Medications:     albuterol sulfate  (90 Base) MCG/ACT inhaler, Inhale See Admin Instructions. Inhale 2 puffs by mouth every 4 to 6 hours as needed, Disp: , Rfl:     atorvastatin (LIPITOR) 20 MG tablet, Take 1 tablet by mouth Daily., Disp: , Rfl:     Budeson-Glycopyrrol-Formoterol (Breztri Aerosphere) 160-9-4.8 MCG/ACT aerosol inhaler, INHALE TWO (2) PUFFS BY MOUTH TWO (2) TIMES EVERY DAY (MORNING & EVENING). RINSE MOUTH AFTER USE, Disp: , Rfl:     buprenorphine-naloxone (SUBOXONE) 8-2 MG per SL tablet, SLOWLY DISSOLVE ONE (1) & ONE HALF (1/2) TABLETS UNDER THE TONGUE EVERY DAY WITHOUT CHEWING OR SWALLOWING, Disp: , Rfl:     clobetasol propionate (TEMOVATE) 0.05 % cream, Apply to affected area 2-3 times weekly as needed, Disp: 30 g, Rfl: 3    escitalopram (LEXAPRO) 10 MG tablet, , Disp: , Rfl:     estradiol (ESTRACE) 0.1 MG/GM vaginal cream, USE 0.5 GM VAGINALLY ON MONDAY, WEDNESDAY AND FRIDAY AT BEDTIME, Disp: 42.5 g, Rfl: 3    ibuprofen (ADVIL,MOTRIN) 800 MG tablet, Take  by mouth As Needed., Disp: , Rfl:     lidocaine (LIDODERM) 5 %, APPLY 1 TOPICALLY ONCE DAILY MAY WEAR UP TO 12 HOURS, Disp: , Rfl:     midodrine (PROAMATINE) 10 MG tablet, Take 1 tablet by mouth 3 (Three) Times a Day Before Meals., Disp: 270 tablet, Rfl: 2    Naloxegol Oxalate (MOVANTIK PO), Take  by mouth., Disp: , Rfl:     O2 (OXYGEN), Inhale 2 L/min 1 (One) Time. Patient only uses as needed, Disp: , Rfl:     ondansetron ODT  "(ZOFRAN-ODT) 4 MG disintegrating tablet, Every 8 (Eight) Hours As Needed., Disp: , Rfl:     pantoprazole (PROTONIX) 40 MG EC tablet, Take 1 tablet by mouth Daily., Disp: , Rfl:     potassium chloride (K-DUR,KLOR-CON) 20 MEQ CR tablet, Take 1 tablet by mouth Daily., Disp: 90 tablet, Rfl: 0    primidone (MYSOLINE) 50 MG tablet, Take 2 tablets by mouth 2 (Two) Times a Day., Disp: 120 tablet, Rfl: 5    Stiolto Respimat 2.5-2.5 MCG/ACT aerosol solution inhaler, , Disp: , Rfl:     traZODone (DESYREL) 100 MG tablet, Take 2 tablets by mouth Every Night., Disp: , Rfl:     triamcinolone (KENALOG) 0.1 % cream, APPLY A THIN LAYER TO AFFECTED AREA(S) TWICE DAILY (AVOIDING EXCESSIVE USE ON FACE AND EARS), Disp: , Rfl:     buprenorphine (SUBUTEX) 2 MG sublingual tablet SL tablet, Place 1 tablet under the tongue Daily. (Patient not taking: Reported on 4/9/2025), Disp: , Rfl:     Sublocade 300 MG/1.5ML solution prefilled syringe, , Disp: , Rfl:     Physical Exam:  Vital Signs:   Vitals:    04/09/25 1020   BP: (!) 72/50   BP Location: Right arm   Patient Position: Sitting   Cuff Size: Adult   Pulse: 82   SpO2: 95%   Weight: 53.5 kg (118 lb)   Height: 162.6 cm (64\")       Physical Exam  Constitutional:       General: She is not in acute distress.     Appearance: Normal appearance. She is well-developed. She is not diaphoretic.   HENT:      Head: Normocephalic and atraumatic.   Eyes:      General: No scleral icterus.     Pupils: Pupils are equal, round, and reactive to light.   Neck:      Trachea: No tracheal deviation.   Cardiovascular:      Rate and Rhythm: Normal rate and regular rhythm.      Heart sounds: Normal heart sounds. No murmur heard.     No friction rub. No gallop.      Comments: Normal JVD.    Pulmonary:      Effort: Pulmonary effort is normal. No respiratory distress.      Breath sounds: Normal breath sounds. No stridor. No wheezing or rales.   Chest:      Chest wall: No tenderness.   Abdominal:      General: Bowel " sounds are normal. There is no distension.      Palpations: Abdomen is soft.      Tenderness: There is no abdominal tenderness. There is no guarding or rebound.   Musculoskeletal:         General: No swelling. Normal range of motion.      Cervical back: Neck supple. No tenderness.   Lymphadenopathy:      Cervical: No cervical adenopathy.   Skin:     General: Skin is warm and dry.      Findings: No erythema.   Neurological:      General: No focal deficit present.      Mental Status: She is alert and oriented to person, place, and time.   Psychiatric:         Mood and Affect: Mood normal.         Behavior: Behavior normal.         Results Review:   I reviewed the patient's new clinical results.        Assessment / Plan:     1. Long Q-T syndrome  -- Known history of long QT syndrome  -- No recent ICD shocks  --Recently had generator changed with her primary electrophysiologist at [facility]  --Avoid QT prolonging medications     2. Cardiomyopathy  -- Last echocardiogram shows LVEF 40-50%  -- Euvolemic and well compensated today  -- Clinical history of CHF     3. Autonomic orthostatic hypotension  -- Remains hypotensive today with persistent orthostatic symptoms  -- Uptitrate midodrine to 10 mg 3 times daily  --[place] salt diet  --If unable to stabilize BP with midodrine, would consider Northera         Follow Up:   Return in about 3 months (around 7/9/2025).      Thank you for allowing me to participate in the care of your patient. Please to not hesitate to contact me with additional questions or concerns.     JAMES Gregg MD  Interventional Cardiology   04/09/2025  10:24 EDT

## 2025-04-14 ENCOUNTER — HOSPITAL ENCOUNTER (OUTPATIENT)
Dept: ULTRASOUND IMAGING | Facility: HOSPITAL | Age: 65
Discharge: HOME | End: 2025-04-14
Payer: MEDICARE

## 2025-04-14 DIAGNOSIS — E04.1 THYROID NODULE: ICD-10-CM

## 2025-04-14 DIAGNOSIS — R42 DIZZINESS: ICD-10-CM

## 2025-04-15 ENCOUNTER — TELEPHONE (OUTPATIENT)
Dept: UROLOGY | Facility: CLINIC | Age: 65
End: 2025-04-15
Payer: MEDICARE

## 2025-04-15 DIAGNOSIS — N95.8 GENITOURINARY SYNDROME OF MENOPAUSE: ICD-10-CM

## 2025-04-15 RX ORDER — ESTRADIOL 0.1 MG/G
CREAM VAGINAL
Qty: 42.5 G | Refills: 0 | Status: SHIPPED | OUTPATIENT
Start: 2025-04-15

## 2025-04-15 NOTE — TELEPHONE ENCOUNTER
Caller: LILLIANA SHAH    Relationship: SELF    Best call back number: [phone] (home)      Requested Prescriptions: estradiol (ESTRACE) 0.1 MG/GM vaginal cream   Requested Prescriptions      No prescriptions requested or ordered in this encounter        Pharmacy where request should be sent:      [place] [address] [address] - [phone] [facility] [phone] FX     Last office visit with prescribing clinician: 6/7/2024   Last telemedicine visit with prescribing clinician: Visit date not found   Next office visit with prescribing clinician: 6/10/2025     Additional details provided by patient: PT STATES ALMOST OUT OF MEDICATION. PHARMACY STATES MEDS SHOULD HAVE LASTED UNTIL JUNE. PT STATES HAS ENOUGH TO MAKE IT THE NEXT COUPLE OF DAYS. PLEASE CALL PT BACK TO DISCUSS AND WHEN REFILL HAS BEEN SENT.THANK YOU.    Does the patient have less than a 3 day supply:  [x] Yes  [] No    Would you like a call back once the refill request has been completed: [x] Yes [] No    If the office needs to give you a call back, can they leave a voicemail: [x] Yes [] No    Emy Up Rep   04/15/25 09:57 EDT

## 2025-04-22 ENCOUNTER — TRANSCRIBE ORDERS (OUTPATIENT)
Dept: ADMINISTRATIVE | Facility: HOSPITAL | Age: 65
End: 2025-04-22
Payer: MEDICARE

## 2025-04-22 DIAGNOSIS — M48.02 CERVICAL STENOSIS OF SPINAL CANAL: Primary | ICD-10-CM

## 2025-04-28 ENCOUNTER — TELEPHONE (OUTPATIENT)
Dept: CARDIOLOGY | Facility: CLINIC | Age: 65
End: 2025-04-28
Payer: MEDICARE

## 2025-04-28 NOTE — TELEPHONE ENCOUNTER
Pt called and states that since starting the Midodrine she has been having dizziness and extreme fatigue. She states that she was in the bed all weekend. States her bp has improved this morning it was 100/79. Please advise.

## 2025-05-27 ENCOUNTER — TELEPHONE (OUTPATIENT)
Dept: CARDIOLOGY | Facility: CLINIC | Age: 65
End: 2025-05-27
Payer: MEDICARE

## 2025-05-27 NOTE — TELEPHONE ENCOUNTER
Pt is to have a Lumbar Fusion done with  is pt ok to proceed? Please advise.  Please send clearance to   [facility] Fax: [phone]                  Phone: [phone] ext [phone]

## 2025-06-23 ENCOUNTER — TRANSCRIBE ORDERS (OUTPATIENT)
Dept: ADMINISTRATIVE | Facility: HOSPITAL | Age: 65
End: 2025-06-23
Payer: MEDICARE

## 2025-06-23 DIAGNOSIS — Z12.31 ENCOUNTER FOR SCREENING MAMMOGRAM FOR MALIGNANT NEOPLASM OF BREAST: Primary | ICD-10-CM

## 2025-07-02 ENCOUNTER — PATIENT MESSAGE (OUTPATIENT)
Dept: NEUROLOGY | Facility: CLINIC | Age: 65
End: 2025-07-02
Payer: MEDICARE

## 2025-07-16 ENCOUNTER — OFFICE VISIT (OUTPATIENT)
Dept: CARDIOLOGY | Facility: CLINIC | Age: 65
End: 2025-07-16
Payer: MEDICARE

## 2025-07-16 VITALS
RESPIRATION RATE: 20 BRPM | DIASTOLIC BLOOD PRESSURE: 64 MMHG | HEIGHT: 64 IN | OXYGEN SATURATION: 97 % | SYSTOLIC BLOOD PRESSURE: 96 MMHG | HEART RATE: 82 BPM | BODY MASS INDEX: 19.56 KG/M2 | WEIGHT: 114.6 LBS

## 2025-07-16 DIAGNOSIS — I95.1 AUTONOMIC ORTHOSTATIC HYPOTENSION: ICD-10-CM

## 2025-07-16 DIAGNOSIS — R07.9 CHEST PAIN, UNSPECIFIED TYPE: ICD-10-CM

## 2025-07-16 DIAGNOSIS — I50.22 CHRONIC SYSTOLIC CONGESTIVE HEART FAILURE: Primary | ICD-10-CM

## 2025-07-16 DIAGNOSIS — I45.81 LONG Q-T SYNDROME: ICD-10-CM

## 2025-07-16 DIAGNOSIS — R07.9 CHEST PAIN ON EXERTION: ICD-10-CM

## 2025-07-16 PROCEDURE — 3078F DIAST BP <80 MM HG: CPT | Performed by: INTERNAL MEDICINE

## 2025-07-16 PROCEDURE — 3074F SYST BP LT 130 MM HG: CPT | Performed by: INTERNAL MEDICINE

## 2025-07-16 PROCEDURE — 99214 OFFICE O/P EST MOD 30 MIN: CPT | Performed by: INTERNAL MEDICINE

## 2025-07-16 RX ORDER — METOPROLOL TARTRATE 25 MG/1
100 TABLET, FILM COATED ORAL ONCE
OUTPATIENT
Start: 2025-07-16

## 2025-07-16 RX ORDER — NITROGLYCERIN 0.4 MG/1
0.4 TABLET SUBLINGUAL
OUTPATIENT
Start: 2025-07-16 | End: 2025-07-16

## 2025-07-16 RX ORDER — METOPROLOL TARTRATE 1 MG/ML
5 INJECTION, SOLUTION INTRAVENOUS
OUTPATIENT
Start: 2025-07-16

## 2025-07-16 RX ORDER — METOPROLOL TARTRATE 25 MG/1
50 TABLET, FILM COATED ORAL ONCE
OUTPATIENT
Start: 2025-07-16

## 2025-07-16 RX ORDER — METOPROLOL TARTRATE 25 MG/1
150 TABLET, FILM COATED ORAL ONCE
OUTPATIENT
Start: 2025-07-16

## 2025-07-16 RX ORDER — METOPROLOL TARTRATE 50 MG
50 TABLET ORAL
OUTPATIENT
Start: 2025-07-16

## 2025-07-16 RX ORDER — FLUDROCORTISONE ACETATE 0.1 MG/1
0.1 TABLET ORAL DAILY
Qty: 90 TABLET | Refills: 1 | Status: SHIPPED | OUTPATIENT
Start: 2025-07-16

## 2025-07-16 RX ORDER — NITROGLYCERIN 0.4 MG/1
0.8 TABLET SUBLINGUAL
OUTPATIENT
Start: 2025-07-16

## 2025-07-16 RX ORDER — SODIUM CHLORIDE 0.9 % (FLUSH) 0.9 %
10 SYRINGE (ML) INJECTION AS NEEDED
OUTPATIENT
Start: 2025-07-16

## 2025-07-16 RX ORDER — METOPROLOL TARTRATE 25 MG/1
200 TABLET, FILM COATED ORAL ONCE
OUTPATIENT
Start: 2025-07-16 | End: 2025-07-16

## 2025-07-16 RX ORDER — SODIUM CHLORIDE 9 MG/ML
40 INJECTION, SOLUTION INTRAVENOUS AS NEEDED
OUTPATIENT
Start: 2025-07-16

## 2025-07-16 RX ORDER — SODIUM CHLORIDE 0.9 % (FLUSH) 0.9 %
10 SYRINGE (ML) INJECTION EVERY 12 HOURS SCHEDULED
OUTPATIENT
Start: 2025-07-16

## 2025-07-16 NOTE — PROGRESS NOTES
"             [facility] Cardiology Office Follow Up Note    Melita Sepulveda  [id number]  2025    Primary Care Provider: Fadumo Zuñiga APRN    Chief Complaint: Routine follow-up    History of Present Illness:   Mrs. Melita Sepulveda is a 65y.o. female who presents to the Cardiology Clinic for routine follow-up.  She has a past medical history significant for hypertension, hypokalemia, COPD, and chronic tobacco use.  She has a past cardiac history significant for long QT syndrome, for which she has a defibrillator in place.  She also has a history of nonischemic cardiomyopathy, with an LVEF 40-50%.  Today, the patient reports mild exertional dyspnea, which she does not appear to significantly limit her daily activities.  With her exertional dyspnea, she does have an occasional \"chest tightness.\"  No associated nausea, vomiting, or diaphoresis.  She denies any ICD shocks.  No history of lower extremity swelling, orthopnea, or PND.  No other new complaints today.     Past Cardiac Testin. Last Coronary Angio:               1.  No obstructive CAD  2. Prior Stress Testing: MPS               1.  No evidence of inducible ischemia  3. Last Echo:               1.  LVEF 40-50%              2.  No significant valvular abnormalities  4. Prior Holter Monitor: Remote, records unavailable    Review of Systems:   Review of Systems   Constitutional:  Negative for activity change, appetite change, chills, diaphoresis, fatigue, fever, unexpected weight gain and unexpected weight loss.   Eyes:  Negative for blurred vision and double vision.   Respiratory:  Positive for chest tightness and shortness of breath. Negative for cough and wheezing.    Cardiovascular:  Negative for chest pain, palpitations and leg swelling.   Gastrointestinal:  Negative for abdominal pain, anal bleeding, blood in stool and GERD.   Endocrine: Negative for cold intolerance and heat intolerance. "   Genitourinary:  Negative for hematuria.   Neurological:  Negative for dizziness, syncope, weakness and light-headedness.   Hematological:  Does not bruise/bleed easily.   Psychiatric/Behavioral:  Negative for depressed mood and stress. The patient is not nervous/anxious.        I have reviewed and/or updated the patient's past medical, past surgical, family, social history, problem list and allergies as appropriate.     Medications:     Current Outpatient Medications:     albuterol sulfate  (90 Base) MCG/ACT inhaler, Inhale See Admin Instructions. Inhale 2 puffs by mouth every 4 to 6 hours as needed, Disp: , Rfl:     atorvastatin (LIPITOR) 20 MG tablet, Take 1 tablet by mouth Daily., Disp: , Rfl:     Budeson-Glycopyrrol-Formoterol (Breztri Aerosphere) 160-9-4.8 MCG/ACT aerosol inhaler, INHALE TWO (2) PUFFS BY MOUTH TWO (2) TIMES EVERY DAY (MORNING & EVENING). RINSE MOUTH AFTER USE, Disp: , Rfl:     buprenorphine-naloxone (SUBOXONE) 8-2 MG per SL tablet, SLOWLY DISSOLVE ONE (1) & ONE HALF (1/2) TABLETS UNDER THE TONGUE EVERY DAY WITHOUT CHEWING OR SWALLOWING, Disp: , Rfl:     clobetasol propionate (TEMOVATE) 0.05 % cream, Apply to affected area 2-3 times weekly as needed, Disp: 30 g, Rfl: 3    escitalopram (LEXAPRO) 10 MG tablet, , Disp: , Rfl:     estradiol (ESTRACE) 0.1 MG/GM vaginal cream, USE 0.5 GM VAGINALLY ON MONDAY, WEDNESDAY AND FRIDAY AT BEDTIME, Disp: 42.5 g, Rfl: 0    ibuprofen (ADVIL,MOTRIN) 800 MG tablet, Take  by mouth As Needed., Disp: , Rfl:     lidocaine (LIDODERM) 5 %, APPLY 1 TOPICALLY ONCE DAILY MAY WEAR UP TO 12 HOURS, Disp: , Rfl:     midodrine (PROAMATINE) 10 MG tablet, Take 1 tablet by mouth 3 (Three) Times a Day Before Meals., Disp: 270 tablet, Rfl: 2    ondansetron ODT (ZOFRAN-ODT) 4 MG disintegrating tablet, Every 8 (Eight) Hours As Needed., Disp: , Rfl:     pantoprazole (PROTONIX) 40 MG EC tablet, Take 1 tablet by mouth Daily., Disp: , Rfl:     potassium chloride (K-DUR,KLOR-CON)  "20 MEQ CR tablet, Take 1 tablet by mouth Daily., Disp: 90 tablet, Rfl: 0    primidone (MYSOLINE) 50 MG tablet, Take 2 tablets by mouth 2 (Two) Times a Day., Disp: 120 tablet, Rfl: 5    traZODone (DESYREL) 100 MG tablet, Take 2 tablets by mouth Every Night., Disp: , Rfl:     triamcinolone (KENALOG) 0.1 % cream, APPLY A THIN LAYER TO AFFECTED AREA(S) TWICE DAILY (AVOIDING EXCESSIVE USE ON FACE AND EARS), Disp: , Rfl:     buprenorphine (SUBUTEX) 2 MG sublingual tablet SL tablet, Place 1 tablet under the tongue Daily. (Patient not taking: Reported on 7/16/2025), Disp: , Rfl:     fludrocortisone 0.1 MG tablet, Take 1 tablet by mouth Daily., Disp: 90 tablet, Rfl: 1    Naloxegol Oxalate (MOVANTIK PO), Take  by mouth. (Patient not taking: Reported on 7/16/2025), Disp: , Rfl:     O2 (OXYGEN), Inhale 2 L/min 1 (One) Time. Patient only uses as needed (Patient not taking: Reported on 7/16/2025), Disp: , Rfl:     Stiolto Respimat 2.5-2.5 MCG/ACT aerosol solution inhaler, , Disp: , Rfl:     Sublocade 300 MG/1.5ML solution prefilled syringe, , Disp: , Rfl:     Physical Exam:  Vital Signs:   Vitals:    07/16/25 1014   BP: 96/64   BP Location: Right arm   Patient Position: Sitting   Cuff Size: Adult   Pulse: 82   Resp: 20   SpO2: 97%   Weight: 52 kg (114 lb 9.6 oz)   Height: 162.6 cm (64.02\")       Physical Exam  Constitutional:       General: She is not in acute distress.     Appearance: Normal appearance. She is well-developed. She is not diaphoretic.   HENT:      Head: Normocephalic and atraumatic.   Eyes:      General: No scleral icterus.     Pupils: Pupils are equal, round, and reactive to light.   Neck:      Trachea: No tracheal deviation.   Cardiovascular:      Rate and Rhythm: Normal rate and regular rhythm.      Heart sounds: Normal heart sounds. No murmur heard.     No friction rub. No gallop.      Comments: Normal JVD.    Pulmonary:      Effort: Pulmonary effort is normal. No respiratory distress.      Breath sounds: " Normal breath sounds. No stridor. No wheezing or rales.   Chest:      Chest wall: No tenderness.   Abdominal:      General: Bowel sounds are normal. There is no distension.      Palpations: Abdomen is soft.      Tenderness: There is no abdominal tenderness. There is no guarding or rebound.   Musculoskeletal:         General: No swelling. Normal range of motion.      Cervical back: Neck supple. No tenderness.   Lymphadenopathy:      Cervical: No cervical adenopathy.   Skin:     General: Skin is warm and dry.      Findings: No erythema.   Neurological:      General: No focal deficit present.      Mental Status: She is alert and oriented to person, place, and time.   Psychiatric:         Mood and Affect: Mood normal.         Behavior: Behavior normal.         Results Review:   I reviewed the patient's new clinical results.        Assessment / Plan:     1. Long Q-T syndrome  -- Known history of long QT syndrome  -- No ICD shocks on remote interrogations  --Avoid QT prolonging medications     2. Cardiomyopathy  -- Last echocardiogram shows LVEF 40-50%  -- Appears to be euvolemic and well compensated on exam today  -- No clinical history of symptomatic CHF  --Suspect exertional dyspnea likely secondary to COPD, however will repeat echocardiogram to reassess LVEF     3. Autonomic orthostatic hypotension  -- Remains hypotensive and orthostatic with midodrine  -- Will add Florinef  -- If symptoms uncontrolled with the addition of Florinef, would consider Northera    4.  Chest pain  -- Mild exertional chest pain, associated with exertional dyspnea in the setting of COPD  -- Prior coronary CTA in 2022 with no evidence of obstructive disease  -- While lower suspicion for angina, will repeat coronary CTA to reassess    Follow Up:   Return in about 6 weeks (around 8/27/2025) for With NP.      Thank you for allowing me to participate in the care of your patient. Please to not hesitate to contact me with additional questions or  karlene.     JAMES Gregg MD  Interventional Cardiology   07/16/2025  10:22 EDT

## (undated) DEVICE — SYR LUERLOK 50ML

## (undated) DEVICE — KT BIOGUARD SXN VLV AIR/H20 4PC DISP

## (undated) DEVICE — CONNECT PORT ENDO CLEVERCAP FOR OLYMPUS SCOPE 24HR

## (undated) DEVICE — NET RESCUENET F/B RETRV

## (undated) DEVICE — ST TBG HYBRID CLEVERCAP FOR OLYMPUS SCOPE 24HR

## (undated) DEVICE — INTRO ACCSR BLNT TP

## (undated) DEVICE — Device

## (undated) DEVICE — FRCP BX RADJAW4 NDL 2.8 240 STD OG

## (undated) DEVICE — HYBRID CO2 TUBING/CAP SET FOR OLYMPUS® SCOPES & CO2 SOURCE: Brand: ERBE

## (undated) DEVICE — VLV SXN AIR/H2O ORCAPOD3 1P/U STRL

## (undated) DEVICE — QUICK CATCH IN-LINE SUCTION POLYP TRAP IS USED FOR SUCTION RETRIEVAL OF ENDOSCOPICALLY REMOVED POLYPS.

## (undated) DEVICE — TUBING, SUCTION, 1/4" X 10', STRAIGHT: Brand: MEDLINE

## (undated) DEVICE — SNAR POLYP CAPTIVATOR2 RND STFF 2.4 240CM 10MM

## (undated) DEVICE — ENDOSCOPY PORT CONNECTOR FOR OLYMPUS® SCOPES: Brand: ERBE

## (undated) DEVICE — INJ SUB/MUCUS ELEVIEW FOR/GI/ENDO/PROC AMPL/10ML

## (undated) DEVICE — CONTN GRAD MEAS TRIANG 32OZ BLK

## (undated) DEVICE — HYBRID TUBING/CAP SET FOR OLYMPUS® SCOPES: Brand: ERBE

## (undated) DEVICE — TRAP POLYP Q/CATCH INLINE

## (undated) DEVICE — LUBE JELLY PK/2.75GM STRL BX/144

## (undated) DEVICE — CONMED SCOPE SAVER BITE BLOCK, 20X27 MM: Brand: SCOPE SAVER

## (undated) DEVICE — MARKR SKIN ENDO SPOT TATTOO PERM

## (undated) DEVICE — SINGLE-USE POLYPECTOMY SNARE: Brand: CAPTIVATOR II

## (undated) DEVICE — NDL SCLEROTHERAPY INTERJECT 25G 4 240CM